# Patient Record
Sex: MALE | Race: WHITE | NOT HISPANIC OR LATINO | Employment: OTHER | URBAN - METROPOLITAN AREA
[De-identification: names, ages, dates, MRNs, and addresses within clinical notes are randomized per-mention and may not be internally consistent; named-entity substitution may affect disease eponyms.]

---

## 2016-06-10 LAB
ERYTHROCYTE [DISTWIDTH] IN BLOOD BY AUTOMATED COUNT: 13.5 %
HCT VFR BLD AUTO: 47.7 %
HEMOGLOBIN: 17 G/DL
MCH RBC QN AUTO: 33.1 PG
MCHC RBC AUTO-ENTMCNC: 35.6 G/DL
MCV RBC AUTO: 93 FL
PLATELET # BLD AUTO: 188 10^9/L
RBC # BLD AUTO: 5.13 10^12/L
WBC # BLD AUTO: 6 10^9/L

## 2017-01-24 DIAGNOSIS — I21.4 NSTEMI (NON-ST ELEVATED MYOCARDIAL INFARCTION) (H): Primary | ICD-10-CM

## 2017-01-25 ENCOUNTER — HOSPITAL ENCOUNTER (OUTPATIENT)
Dept: CARDIOLOGY | Facility: CLINIC | Age: 77
Discharge: HOME | End: 2017-01-25
Attending: INTERNAL MEDICINE | Admitting: INTERNAL MEDICINE
Payer: MEDICARE

## 2017-01-25 DIAGNOSIS — I21.4 NSTEMI (NON-ST ELEVATED MYOCARDIAL INFARCTION) (H): ICD-10-CM

## 2017-01-25 DIAGNOSIS — E78.2 MIXED HYPERLIPIDEMIA: ICD-10-CM

## 2017-01-25 LAB
ALT SERPL W P-5'-P-CCNC: <5 U/L (ref 5–30)
CHOLEST SERPL-MCNC: 177 MG/DL
HDLC SERPL-MCNC: 48 MG/DL
LDLC SERPL CALC-MCNC: 94 MG/DL
NONHDLC SERPL-MCNC: 129 MG/DL
TRIGL SERPL-MCNC: 174 MG/DL

## 2017-01-25 PROCEDURE — 80061 LIPID PANEL: CPT | Performed by: INTERNAL MEDICINE

## 2017-01-25 PROCEDURE — 84460 ALANINE AMINO (ALT) (SGPT): CPT | Performed by: INTERNAL MEDICINE

## 2017-01-25 PROCEDURE — 36415 COLL VENOUS BLD VENIPUNCTURE: CPT | Performed by: INTERNAL MEDICINE

## 2017-01-25 PROCEDURE — 93306 TTE W/DOPPLER COMPLETE: CPT | Mod: 26 | Performed by: INTERNAL MEDICINE

## 2017-01-25 PROCEDURE — 93306 TTE W/DOPPLER COMPLETE: CPT

## 2017-01-31 ENCOUNTER — DOCUMENTATION ONLY (OUTPATIENT)
Dept: CARDIOLOGY | Facility: CLINIC | Age: 77
End: 2017-01-31

## 2017-02-01 ENCOUNTER — OFFICE VISIT (OUTPATIENT)
Dept: CARDIOLOGY | Facility: CLINIC | Age: 77
End: 2017-02-01
Attending: INTERNAL MEDICINE
Payer: MEDICARE

## 2017-02-01 VITALS
HEART RATE: 80 BPM | DIASTOLIC BLOOD PRESSURE: 80 MMHG | WEIGHT: 215 LBS | HEIGHT: 71 IN | SYSTOLIC BLOOD PRESSURE: 158 MMHG | BODY MASS INDEX: 30.1 KG/M2

## 2017-02-01 DIAGNOSIS — I10 ESSENTIAL HYPERTENSION: ICD-10-CM

## 2017-02-01 DIAGNOSIS — I21.4 NSTEMI (NON-ST ELEVATED MYOCARDIAL INFARCTION) (H): ICD-10-CM

## 2017-02-01 DIAGNOSIS — E78.2 MIXED HYPERLIPIDEMIA: ICD-10-CM

## 2017-02-01 DIAGNOSIS — I10 BENIGN ESSENTIAL HYPERTENSION: ICD-10-CM

## 2017-02-01 PROCEDURE — 99214 OFFICE O/P EST MOD 30 MIN: CPT | Performed by: INTERNAL MEDICINE

## 2017-02-01 RX ORDER — CARVEDILOL 12.5 MG/1
12.5 TABLET ORAL 2 TIMES DAILY
Qty: 60 TABLET | Refills: 11 | Status: SHIPPED | OUTPATIENT
Start: 2017-02-01 | End: 2017-07-25

## 2017-02-01 NOTE — PROGRESS NOTES
HPI and Plan:   See dictation    Orders Placed This Encounter   Procedures     Lipid Profile     ALT     Lipid Profile     ALT     Follow-Up with Cardiac Advanced Practice Provider     Follow-Up with Cardiologist       Orders Placed This Encounter   Medications     carvedilol (COREG) 12.5 MG tablet     Sig: Take 1 tablet (12.5 mg) by mouth 2 times daily     Dispense:  60 tablet     Refill:  11       Medications Discontinued During This Encounter   Medication Reason     carvedilol (COREG) 6.25 MG tablet Reorder         Encounter Diagnoses   Name Primary?     Mixed hyperlipidemia      Benign essential hypertension      Essential hypertension      NSTEMI (non-ST elevated myocardial infarction) (H)        CURRENT MEDICATIONS:  Current Outpatient Prescriptions   Medication Sig Dispense Refill     carvedilol (COREG) 12.5 MG tablet Take 1 tablet (12.5 mg) by mouth 2 times daily 60 tablet 11     ticagrelor (BRILINTA) 90 MG tablet Take 1 tablet (90 mg) by mouth every 12 hours 60 tablet 6     Leuprolide Acetate (LUPRON IJ)        glimepiride (AMARYL) 4 MG tablet Take 1 tablet (4 mg) by mouth every morning (before breakfast) 90 tablet 1     metFORMIN (GLUCOPHAGE) 1000 MG tablet Take 1 tablet (1,000 mg) by mouth 2 times daily (with meals) Patient is due for office visit and/or labs before further refills. 60 tablet 0     metFORMIN (GLUCOPHAGE) 1000 MG tablet TAKE 1 TABLET BY MOUTH 2 TIMES DAILY WITH MEALS. DUE FOR LABS FOR REFILLS. 180 tablet 3     lisinopril-hydrochlorothiazide (PRINZIDE,ZESTORETIC) 20-12.5 MG per tablet Take 1 tablet by mouth daily 90 tablet 3     blood glucose monitoring (NO BRAND SPECIFIED) test strip Accu-Chek Smartview test strips      Use to test blood sugars 4  times daily or as directed 50 each 10     blood glucose monitoring (ACCU-CHEK FASTCLIX) lancets Use to test blood sugar 4 times daily or as directed.  Ok to substitute alternative if insurance prefers. 1 Box prn     atorvastatin (LIPITOR) 80 MG  tablet Take 1 tablet (80 mg) by mouth daily 90 tablet 3     Glucosamine-Chondroitin (OSTEO BI-FLEX REGULAR STRENGTH PO) Take 1 tablet by mouth daily       nitroglycerin (NITROSTAT) 0.4 MG SL tablet Place 1 tablet (0.4 mg) under the tongue every 5 minutes as needed for chest pain if you are still having symptoms after 3 doses (15 minutes) call 911. 25 tablet 1     minocycline (MINOCIN,DYNACIN) 50 MG capsule Take 50 mg by mouth daily as needed Prn for rosacea       clobetasol propionate 0.05 % LOTN Externally apply  topically. For hand and arm rash prn       desonide (DESOWEN) 0.05 % ointment Apply topically as needed Facial rash       aspirin 81 MG tablet Take 81 mg by mouth daily        Cholecalciferol (VITAMIN D3) 3,000 Units daily        [DISCONTINUED] carvedilol (COREG) 6.25 MG tablet Take 1 tablet (6.25 mg) by mouth 2 times daily 180 tablet 3       ALLERGIES   No Known Allergies    PAST MEDICAL HISTORY:  Past Medical History   Diagnosis Date     Hypertension 2002     Diabetes mellitus (H) 2005     Hypercholesteremia 2002     Rosacea      Nephrolithiasis 2007     Elevated PSA 2009     bx neg Dr. Dill     History of colonoscopy 2012     nl     Prostate cancer (H) 2013     surgery by Dr. Dudley, then had xrt     Erectile dysfunction        PAST SURGICAL HISTORY:  Past Surgical History   Procedure Laterality Date     Leg surgery  2005     Colonoscopy  1/20/2012     Procedure:COLONOSCOPY; COLONOSCOPY; Surgeon:ELSY GARCIA; Location: GI     Tonsillectomy  5     Robotic surgery for prostate cancer  2013     Dr. Dudley     Coronary angiography adult order       Heart cath, angioplasty         FAMILY HISTORY:  Family History   Problem Relation Age of Onset     CANCER Mother        SOCIAL HISTORY:  Social History     Social History     Marital Status:      Spouse Name: N/A     Number of Children: N/A     Years of Education: N/A     Occupational History     airport       Social History Main Topics  "    Smoking status: Never Smoker      Smokeless tobacco: Never Used     Alcohol Use: 0.0 oz/week     0 Standard drinks or equivalent per week      Comment: 1-2  per week     Drug Use: No     Sexual Activity: No     Other Topics Concern     Caffeine Concern No     no caffeine     Sleep Concern No     Stress Concern No     Weight Concern No     Special Diet Yes     cut out pop and sodium     Exercise Yes     walking a lot, cardio rehab     Social History Narrative    so I said we could give him an Accu-Chek Simi from the clinic (I may drop it off with you on 3/2/16).             Review of Systems:  Skin:  Negative       Eyes:  Positive for glasses    ENT:         Respiratory:  Negative       Cardiovascular:  Negative      Gastroenterology: Negative      Genitourinary:  Negative      Musculoskeletal:  Positive for joint pain;arthritis    Neurologic:  Negative      Psychiatric:  Negative      Heme/Lymph/Imm:  Negative      Endocrine:  Positive for diabetes      Physical Exam:  Vitals: /80 mmHg  Pulse 80  Ht 1.803 m (5' 11\")  Wt 97.523 kg (215 lb)  BMI 30.00 kg/m2    Constitutional:  cooperative, alert and oriented, well developed, well nourished, in no acute distress overweight      Skin:  warm and dry to the touch, no apparent skin lesions or masses noted        Head:  normocephalic, no masses or lesions        Eyes:  pupils equal and round, conjunctivae and lids unremarkable, sclera white, no xanthalasma, EOMS intact, no nystagmus        ENT:  no pallor or cyanosis, dentition good        Neck:  carotid pulses are full and equal bilaterally, JVP normal, no carotid bruit, no thyromegaly        Chest:  normal breath sounds, clear to auscultation, normal A-P diameter, normal symmetry, normal respiratory excursion, no use of accessory muscles          Cardiac: regular rhythm;normal S1 and S2;apical impulse not displaced   S4 no presence of murmur            Abdomen:  abdomen soft, non-tender, BS normoactive, no " mass, no HSM, no bruits        Vascular: pulses full and equal, no bruits auscultated                                        Extremities and Back:  no deformities, clubbing, cyanosis, erythema observed;no edema         right radial site clean without hum or bruit    Neurological:  affect appropriate, oriented to time, person and place;no gross motor deficits              CC  Lg Mancilla MD   [facility]  [address]  [place] [address]

## 2017-02-01 NOTE — PROGRESS NOTES
2017             Stephan Cedeno MD    [facility]   [address]   [place] [address]      RE:   Jaguar Tristan   MRN: [id number]   :  1940      Dear Dr. Cedeno:      It was my pleasure to see your patient, Jaguar Tristan, in followup.  As you know, this is a very pleasant gentleman with a known history of coronary artery disease.  If you remember, in 2016, he presented with a non-ST elevation myocardial infarct involving the inferior wall and he had 2 stents placed in the distal right coronary artery which were drug-eluting stents.  They were placed on 2016, so he is coming up to his 1-year anniversary of the placement of those stents.      He has had no chest pains or chest pressure, but he has had deterioration in some of his risk factors for coronary artery disease.  His lipid profile is significantly worse than previously.  His LDL has risen from 47 up to 94.  His HDL still remains good at 48 and his triglycerides have risen from 100 to 174.  He has gained a significant amount of weight.  In 2016, he weighed 206 pounds.  He is now 215 pounds.  He developed sacroiliac joint problems and has not been able to exercise which may explain in part why he has gained weight and why his blood pressure and lipids have worsened.  He says when he was down at [place], his blood pressure is in the high 130s.  In general, his blood pressure in the past has been well controlled.  I did recheck his blood pressure today at the end of our meeting and was 162/88.      IMPRESSION:      1.  Asymptomatic with respect to coronary artery disease.  He is nearly 1 year after the stenting of his right coronary artery for a non-ST elevation myocardial infarction.   2.  His left ventricular systolic function has returned to normal.  If you remember, he did have evidence of inferior hypokinesis at the time of his non-ST elevation myocardial infarct at this time last year.   3.  Deterioration of his  lipid profile as described above.   4.  Hypertension.  His blood pressures does not appear to be well controlled, and even on recheck his blood pressure was even higher.  This may in part be due to the fact that he has not been able to exercise because of sacroiliac pain.  That has significantly improved with physical therapy.   5.  Type 2 diabetes mellitus with hemoglobin A1c of 8.3 on the .      PLAN:  I will double the patient's carvedilol from 6.25 mg twice a day to 12.5 mg twice a day.  I have asked him to exercise to try and lose weight and to adhere to a low-cholesterol diet.  In 12 weeks' time, I will have him return and we will recheck his blood pressure and his cholesterol at that stage.  I will have him come back to see me in 6 months' time.  Finally, we will stop the Brilinta medication at the end of this month, which will be 1 year from the time of his stenting.        It has been a pleasure to be involved in the care of this very nice patient.      Sincerely,         MD JOE Smith MD, [facility]             D: 2017 09:45   T: 2017 14:11   MT: AYLA      Name:     ESTEFANIA HERNANDEZ   MRN:      [id number]        Account:      [id number]   :      1940           Service Date: 2017      Document: [id number]

## 2017-02-01 NOTE — Clinical Note
2017             Stephan Cedeno MD    [facility]   [address]   [place] [address]      RE:   Jaguar Tristan   MRN: [id number]   :  1940      Dear Dr. Cedeno:      It was my pleasure to see your patient, Jaguar Tristan, in followup.  As you know, this is a very pleasant gentleman with a known history of coronary artery disease.  If you remember, in 2016, he presented with a non-ST elevation myocardial infarct involving the inferior wall and he had 2 stents placed in the distal right coronary artery which were drug-eluting stents.  They were placed on 2016, so he is coming up to his 1-year anniversary of the placement of those stents.      He has had no chest pains or chest pressure, but he has had deterioration in some of his risk factors for coronary artery disease.  His lipid profile is significantly worse than previously.  His LDL has risen from 47 up to 94.  His HDL still remains good at 48 and his triglycerides have risen from 100 to 174.  He has gained a significant amount of weight.  In 2016, he weighed 206 pounds.  He is now 215 pounds.  He developed sacroiliac joint problems and has not been able to exercise which may explain in part why he has gained weight and why his blood pressure and lipids have worsened.  He says when he was down at [place], his blood pressure is in the high 130s.  In general, his blood pressure in the past has been well controlled.  I did recheck his blood pressure today at the end of our meeting and was 162/88.      IMPRESSION:      1.  Asymptomatic with respect to coronary artery disease.  He is nearly 1 year after the stenting of his right coronary artery for a non-ST elevation myocardial infarction.   2.  His left ventricular systolic function has returned to normal.  If you remember, he did have evidence of inferior hypokinesis at the time of his non-ST elevation myocardial infarct at this time last year.   3.  Deterioration of his  lipid profile as described above.   4.  Hypertension.  His blood pressures does not appear to be well controlled, and even on recheck his blood pressure was even higher.  This may in part be due to the fact that he has not been able to exercise because of sacroiliac pain.  That has significantly improved with physical therapy.   5.  Type 2 diabetes mellitus with hemoglobin A1c of 8.3 on the 11/30.      PLAN:  I will double the patient's carvedilol from 6.25 mg twice a day to 12.5 mg twice a day.  I have asked him to exercise to try and lose weight and to adhere to a low-cholesterol diet.  In 12 weeks' time, I will have him return and we will recheck his blood pressure and his cholesterol at that stage.  I will have him come back to see me in 6 months' time.  Finally, we will stop the Brilinta medication at the end of this month, which will be 1 year from the time of his stenting.        It has been a pleasure to be involved in the care of this very nice patient.      Sincerely,         Lg Jessica MD

## 2017-03-14 ENCOUNTER — OFFICE VISIT (OUTPATIENT)
Dept: FAMILY MEDICINE | Facility: CLINIC | Age: 77
End: 2017-03-14
Payer: MEDICARE

## 2017-03-14 VITALS
BODY MASS INDEX: 30.15 KG/M2 | TEMPERATURE: 98.3 F | SYSTOLIC BLOOD PRESSURE: 128 MMHG | HEIGHT: 71 IN | HEART RATE: 68 BPM | DIASTOLIC BLOOD PRESSURE: 79 MMHG | WEIGHT: 215.4 LBS | OXYGEN SATURATION: 88 %

## 2017-03-14 DIAGNOSIS — E11.69 TYPE 2 DIABETES MELLITUS WITH OTHER SPECIFIED COMPLICATION, WITHOUT LONG-TERM CURRENT USE OF INSULIN (H): Primary | ICD-10-CM

## 2017-03-14 LAB — HBA1C MFR BLD: 8.3 % (ref 4.3–6)

## 2017-03-14 PROCEDURE — 36415 COLL VENOUS BLD VENIPUNCTURE: CPT | Performed by: INTERNAL MEDICINE

## 2017-03-14 PROCEDURE — 99213 OFFICE O/P EST LOW 20 MIN: CPT | Performed by: INTERNAL MEDICINE

## 2017-03-14 PROCEDURE — 83036 HEMOGLOBIN GLYCOSYLATED A1C: CPT | Performed by: INTERNAL MEDICINE

## 2017-03-14 RX ORDER — GLIMEPIRIDE 4 MG/1
TABLET ORAL
Qty: 45 TABLET | Refills: 2 | Status: SHIPPED | OUTPATIENT
Start: 2017-03-14 | End: 2017-07-11

## 2017-03-14 ASSESSMENT — ANXIETY QUESTIONNAIRES
1. FEELING NERVOUS, ANXIOUS, OR ON EDGE: NOT AT ALL
2. NOT BEING ABLE TO STOP OR CONTROL WORRYING: NOT AT ALL
5. BEING SO RESTLESS THAT IT IS HARD TO SIT STILL: NOT AT ALL
7. FEELING AFRAID AS IF SOMETHING AWFUL MIGHT HAPPEN: NOT AT ALL
6. BECOMING EASILY ANNOYED OR IRRITABLE: NOT AT ALL
3. WORRYING TOO MUCH ABOUT DIFFERENT THINGS: NOT AT ALL
GAD7 TOTAL SCORE: 0

## 2017-03-14 ASSESSMENT — ENCOUNTER SYMPTOMS
TINGLING: 0
NAUSEA: 0
ABDOMINAL PAIN: 0
PALPITATIONS: 0
POLYDIPSIA: 0
SENSORY CHANGE: 0
SHORTNESS OF BREATH: 0
DIARRHEA: 0
PND: 0
VOMITING: 0
DIZZINESS: 0
ORTHOPNEA: 0
EYE PAIN: 0
CLAUDICATION: 0
DIAPHORESIS: 0
FOCAL WEAKNESS: 0
WEIGHT LOSS: 0
BLURRED VISION: 0
COUGH: 0
HEADACHES: 0

## 2017-03-14 ASSESSMENT — PATIENT HEALTH QUESTIONNAIRE - PHQ9: 5. POOR APPETITE OR OVEREATING: NOT AT ALL

## 2017-03-14 NOTE — NURSING NOTE
"Chief Complaint   Patient presents with     Follow Up For     Diabetes       Initial /87 (BP Location: Left arm, Patient Position: Chair, Cuff Size: Adult Large)  Pulse 68  Temp 98.3  F (36.8  C) (Oral)  Ht 5' 11\" (1.803 m)  Wt 215 lb 6.4 oz (97.7 kg)  SpO2 (!) 88%  BMI 30.04 kg/m2 Estimated body mass index is 30.04 kg/(m^2) as calculated from the following:    Height as of this encounter: 5' 11\" (1.803 m).    Weight as of this encounter: 215 lb 6.4 oz (97.7 kg).  Medication Reconciliation: complete     ALYCE Dolan MA March 14, 2017 9:26 AM      "

## 2017-03-14 NOTE — MR AVS SNAPSHOT
"              After Visit Summary   3/14/2017    Jaguar Tristan    MRN: [id number]           Patient Information     Date Of Birth          1940        Visit Information        Provider Department      3/14/2017 9:30 AM Stephan Cedeno MD [facility]        Today's Diagnoses     Type 2 diabetes mellitus with other specified complication, without long-term current use of insulin (H)    -  1      Care Instructions    Increase your glimepiride dose by 2 mg every 2 weeks up to maximum of 8 mg once daily until your blood sugar readings are consistently between 100-140, and not going less than 80.    Continue your metformin.    Follow up in 3 months.          Follow-ups after your visit        Who to contact     If you have questions or need follow up information about today's clinic visit or your schedule please contact [facility] directly at [phone].  Normal or non-critical lab and imaging results will be communicated to you by MyChart, letter or phone within 4 business days after the clinic has received the results. If you do not hear from us within 7 days, please contact the clinic through [employer] or phone. If you have a critical or abnormal lab result, we will notify you by phone as soon as possible.  Submit refill requests through [employer] or call your pharmacy and they will forward the refill request to us. Please allow 3 business days for your refill to be completed.          Additional Information About Your Visit        MyChart Information     [employer] lets you send messages to your doctor, view your test results, renew your prescriptions, schedule appointments and more. To sign up, go to [email or web address] . Click on \"Log in\" on the left side of the screen, which will take you to the Welcome page. Then click on \"Sign up Now\" on the right side of the page.     You will be asked to enter the access code listed below, as well as some personal information. " "Please follow the directions to create your username and password.     Your access code is: [email or web address]  Expires: 2017  9:57 AM     Your access code will  in 90 days. If you need help or a new code, please call your [facility] clinic or [phone].        Care EveryWhere ID     This is your Care EveryWhere ID. This could be used by other organizations to access your [facility] medical records  [id number]        Your Vitals Were     Pulse Temperature Height Pulse Oximetry BMI (Body Mass Index)       68 98.3  F (36.8  C) (Oral) 5' 11\" (1.803 m) 88% 30.04 kg/m2        Blood Pressure from Last 3 Encounters:   17 141/87   17 158/80   16 119/72    Weight from Last 3 Encounters:   17 215 lb 6.4 oz (97.7 kg)   17 215 lb (97.5 kg)   16 209 lb (94.8 kg)              We Performed the Following     Hemoglobin A1c          Today's Medication Changes          These changes are accurate as of: 3/14/17  9:57 AM.  If you have any questions, ask your nurse or doctor.               These medicines have changed or have updated prescriptions.        Dose/Directions    glimepiride 4 MG tablet   Commonly known as:  AMARYL   This may have changed:    - how much to take  - how to take this  - when to take this  - additional instructions   Used for:  Type 2 diabetes mellitus with other specified complication, without long-term current use of insulin (H)   Changed by:  Stephan Cedeno MD        1 1/2 tablets once a day   Quantity:  45 tablet   Refills:  2            Where to get your medicines      These medications were sent to [place] [address] [place] [facility] [address] [employer] [facility]  [address] [facility] [address]     Phone:  [phone]     glimepiride 4 MG tablet                Primary Care Provider Office Phone # Fax #    Stephan Cedeno [phone]       [facility] [address]  [facility] [address]      "   Goals        General    Medication 1 (pt-stated)     Notes - Note edited  3/16/2016  9:36 AM by Barbara Davila, RN    I will carry my nitroglycerine with me wherever I go and take as prescribed if needed.  As of today's date 2/26/2016 goal is met at 0 - 25%.   Goal Status:  Ongoing  As of today's date 3/16/2016 goal is met at 51 - 75%.   Goal Status:  Ongoing            Thank you!     Thank you for choosing [facility]  for your care. Our goal is always to provide you with excellent care. Hearing back from our patients is one way we can continue to improve our services. Please take a few minutes to complete the written survey that you may receive in the mail after your visit with us. Thank you!             Your Updated Medication List - Protect others around you: Learn how to safely use, store and throw away your medicines at www.disposemymeds.org.          This list is accurate as of: 3/14/17  9:57 AM.  Always use your most recent med list.                   Brand Name Dispense Instructions for use    aspirin 81 MG tablet      Take 81 mg by mouth daily       atorvastatin 80 MG tablet    LIPITOR    90 tablet    Take 1 tablet (80 mg) by mouth daily       blood glucose monitoring lancets     1 Box    Use to test blood sugar 4 times daily or as directed.  Ok to substitute alternative if insurance prefers.       blood glucose monitoring test strip    no brand specified    50 each    Accu-Chek Smartview test strips      Use to test blood sugars 4  times daily or as directed       carvedilol 12.5 MG tablet    COREG    60 tablet    Take 1 tablet (12.5 mg) by mouth 2 times daily       clobetasol propionate 0.05 % Lotn      Externally apply  topically. For hand and arm rash prn       desonide 0.05 % ointment    DESOWEN     Apply topically as needed Facial rash       glimepiride 4 MG tablet    AMARYL    45 tablet    1 1/2 tablets once a day       lisinopril-hydrochlorothiazide 20-12.5 MG per tablet     PRINZIDE/ZESTORETIC    90 tablet    Take 1 tablet by mouth daily       LUPRON IJ          * metFORMIN 1000 MG tablet    GLUCOPHAGE    180 tablet    TAKE 1 TABLET BY MOUTH 2 TIMES DAILY WITH MEALS. DUE FOR LABS FOR REFILLS.       * metFORMIN 1000 MG tablet    GLUCOPHAGE    60 tablet    Take 1 tablet (1,000 mg) by mouth 2 times daily (with meals) Patient is due for office visit and/or labs before further refills.       minocycline 50 MG capsule    MINOCIN/DYNACIN     Take 50 mg by mouth daily as needed Prn for rosacea       nitroglycerin 0.4 MG sublingual tablet    NITROSTAT    25 tablet    Place 1 tablet (0.4 mg) under the tongue every 5 minutes as needed for chest pain if you are still having symptoms after 3 doses (15 minutes) call 911.       OSTEO BI-FLEX REGULAR STRENGTH PO      Take 1 tablet by mouth daily       ticagrelor 90 MG tablet    BRILINTA    60 tablet    Take 1 tablet (90 mg) by mouth every 12 hours       VITAMIN D3      3,000 Units daily       * Notice:  This list has 2 medication(s) that are the same as other medications prescribed for you. Read the directions carefully, and ask your doctor or other care provider to review them with you.

## 2017-03-14 NOTE — PROGRESS NOTES
"HPI Comments:   He is a known diabetic and is on oral therapy with metformin and glimepiride. He used to be on Lantus; however, as he became more active with exercise the Lantus was discontinued.  Denies having any polydipsia, polyuria, polyphagia.  Hemoglobin A1c today is 8.3.    Following up at the [facility] ER for his prostate cancer. Recent PSA was reportedly unremarkable. He is currently receiving Lupron shots.      Past Medical History   Diagnosis Date     Diabetes mellitus (H) 2005     Elevated PSA 2009     bx neg Dr. Dill     Erectile dysfunction      History of colonoscopy 2012     nl     Hypercholesteremia 2002     Hypertension 2002     Nephrolithiasis 2007     Prostate cancer (H) 2013     surgery by Dr. Dudley, then had xrt     Rosacea        Review of Systems   Constitutional: Negative for diaphoresis, malaise/fatigue and weight loss.   Eyes: Negative for blurred vision and pain.   Respiratory: Negative for cough and shortness of breath.    Cardiovascular: Negative for chest pain, palpitations, orthopnea, claudication, leg swelling and PND.   Gastrointestinal: Negative for abdominal pain, diarrhea, nausea and vomiting.   Neurological: Negative for dizziness, tingling, sensory change, focal weakness and headaches.   Endo/Heme/Allergies: Negative for polydipsia.       /87 (BP Location: Left arm, Patient Position: Chair, Cuff Size: Adult Large)  Pulse 68  Temp 98.3  F (36.8  C) (Oral)  Ht 5' 11\" (1.803 m)  Wt 215 lb 6.4 oz (97.7 kg)  SpO2 (!) 88%  BMI 30.04 kg/m2      Physical Exam   Constitutional: He is oriented to person, place, and time. No distress.   Eyes: Conjunctivae and EOM are normal. Pupils are equal, round, and reactive to light.   Neck: No thyromegaly present.   Cardiovascular: Normal rate, regular rhythm and normal heart sounds.    Pulmonary/Chest: Effort normal and breath sounds normal. No respiratory distress.   Musculoskeletal: He exhibits no edema.   Neurological: He is alert and " oriented to person, place, and time. He has normal reflexes. Coordination normal. GCS score is 15.   Nursing note and vitals reviewed.        ICD-10-CM    1. Type 2 diabetes mellitus with other specified complication, without long-term current use of insulin (H) E11.69 glimepiride (AMARYL) 4 MG tablet         Patient Instructions   Increase your glimepiride dose by 2 mg every 2 weeks up to maximum of 8 mg once daily until your blood sugar readings are consistently between 100-140, and not going less than 80.    Continue your metformin.    Follow up in 3 months.

## 2017-03-14 NOTE — PATIENT INSTRUCTIONS
Increase your glimepiride dose by 2 mg every 2 weeks up to maximum of 8 mg once daily until your blood sugar readings are consistently between 100-140, and not going less than 80.    Continue your metformin.    Follow up in 3 months.

## 2017-03-15 ASSESSMENT — PATIENT HEALTH QUESTIONNAIRE - PHQ9: SUM OF ALL RESPONSES TO PHQ QUESTIONS 1-9: 0

## 2017-03-15 ASSESSMENT — ANXIETY QUESTIONNAIRES: GAD7 TOTAL SCORE: 0

## 2017-05-24 DIAGNOSIS — E78.5 HYPERLIPIDEMIA LDL GOAL <100: ICD-10-CM

## 2017-05-24 RX ORDER — ATORVASTATIN CALCIUM 80 MG/1
80 TABLET, FILM COATED ORAL DAILY
Qty: 90 TABLET | Refills: 0 | Status: SHIPPED | OUTPATIENT
Start: 2017-05-24 | End: 2017-08-24

## 2017-05-24 NOTE — TELEPHONE ENCOUNTER
Prescription approved per [facility] [place] Protocol.  Shawna Crespo RN

## 2017-05-24 NOTE — TELEPHONE ENCOUNTER
Pending Prescriptions:                       Disp   Refills    atorvastatin (LIPITOR) 80 MG tablet       90 tab*3            Sig: Take 1 tablet (80 mg) by mouth daily         Last Written Prescription Date: 3/3/16  Last Fill Quantity: 90, # refills: 3  Last Office Visit with [facility], [facility] or [facility] prescribing provider: 3/14/17       Lab Results   Component Value Date    CHOL 177 01/25/2017     Lab Results   Component Value Date    HDL 48 01/25/2017     Lab Results   Component Value Date    LDL 94 01/25/2017     Lab Results   Component Value Date    TRIG 174 01/25/2017     Lab Results   Component Value Date    CHOLHDLRATIO 3.9 05/27/2015

## 2017-06-08 DIAGNOSIS — I10 ESSENTIAL HYPERTENSION: ICD-10-CM

## 2017-06-08 RX ORDER — LISINOPRIL AND HYDROCHLOROTHIAZIDE 12.5; 2 MG/1; MG/1
1 TABLET ORAL DAILY
Qty: 90 TABLET | Refills: 0 | Status: SHIPPED | OUTPATIENT
Start: 2017-06-08 | End: 2017-09-15

## 2017-06-29 ENCOUNTER — TELEPHONE (OUTPATIENT)
Dept: FAMILY MEDICINE | Facility: CLINIC | Age: 77
End: 2017-06-29

## 2017-06-29 DIAGNOSIS — E11.69 TYPE 2 DIABETES MELLITUS WITH OTHER SPECIFIED COMPLICATION, WITHOUT LONG-TERM CURRENT USE OF INSULIN (H): ICD-10-CM

## 2017-06-29 RX ORDER — GLIMEPIRIDE 4 MG/1
TABLET ORAL
Qty: 45 TABLET | Refills: 2 | Status: CANCELLED | OUTPATIENT
Start: 2017-06-29

## 2017-06-29 NOTE — TELEPHONE ENCOUNTER
Glimeperide         Last Written Prescription Date: 03/14/17  Last Fill Quantity: 45, # refills: 2  [place] [facility] [place] [facility] or  Health prescribing provider:  03/14/17   Next 5 appointments (look out 90 days)     Jul 11, 2017  9:30 AM CDT   Office Visit with Stephan Cedeno MD   [facility] ([facility])    [address] [facility] [address] [phone]            Sep 05, 2017 10:45 AM CDT   Return Visit with Lg Mancilla MD   [facility] [place] ([facility])    [address]  [place] [address] [phone]                   BP Readings from Last 3 Encounters:   03/14/17 128/79   02/01/17 158/80   11/30/16 119/72     Lab Results   Component Value Date    MICROL 8 03/03/2016     Lab Results   Component Value Date    UMALCR 4.29 03/03/2016     Creatinine   Date Value Ref Range Status   03/11/2016 1.18 0.70 - 1.30 mg/dL Final   ]  GFR Estimate   Date Value Ref Range Status   03/11/2016 64 >60 mL/min/1.7m2 Final   03/03/2016 79 >60 mL/min/1.7m2 Final     Comment:     Non  GFR Calc   02/23/2016 >90  Non  GFR Calc   >60 mL/min/1.7m2 Final     GFR Estimate If Black   Date Value Ref Range Status   03/11/2016 78 >60 mL/min/1.7m2 Final   03/03/2016 >90   GFR Calc   >60 mL/min/1.7m2 Final   02/23/2016 >90   GFR Calc   >60 mL/min/1.7m2 Final     Lab Results   Component Value Date    CHOL 177 01/25/2017     Lab Results   Component Value Date    HDL 48 01/25/2017     Lab Results   Component Value Date    LDL 94 01/25/2017     Lab Results   Component Value Date    TRIG 174 01/25/2017     Lab Results   Component Value Date    CHOLHDLRATIO 3.9 05/27/2015     Lab Results   Component Value Date    AST 10 03/03/2016     Lab Results   Component Value Date    ALT <5 01/25/2017     Lab Results   Component Value Date    A1C 8.3 03/14/2017     A1C 8.3 11/30/2016    A1C 10.9 02/20/2016    A1C 7.4 03/07/2014    A1C 6.6 07/30/2013     Potassium   Date Value Ref Range Status   03/11/2016 4.2 3.5 - 5.1 mmol/L Final     Jayshree Del Cid MA

## 2017-06-29 NOTE — TELEPHONE ENCOUNTER
Reason for Call:  Medication or medication refill:    Do you use a [facility]?  Name of the pharmacy and phone number for the current request:    [facility] [id number] [place] [facility], [place] - [address] [address]    Name of the medication requested: glimepiride (AMARYL) 4 MG tablet    Other request: Please refill    Can we leave a detailed message on this number? YES    Phone number patient can be reached at: Home number on file [phone] (home)    Best Time: anytime    Call taken on 6/29/2017 at 11:26 AM by Deepa Malin

## 2017-07-11 ENCOUNTER — OFFICE VISIT (OUTPATIENT)
Dept: FAMILY MEDICINE | Facility: CLINIC | Age: 77
End: 2017-07-11
Payer: MEDICARE

## 2017-07-11 ENCOUNTER — TELEPHONE (OUTPATIENT)
Dept: FAMILY MEDICINE | Facility: CLINIC | Age: 77
End: 2017-07-11

## 2017-07-11 VITALS
BODY MASS INDEX: 30.88 KG/M2 | OXYGEN SATURATION: 98 % | SYSTOLIC BLOOD PRESSURE: 123 MMHG | DIASTOLIC BLOOD PRESSURE: 72 MMHG | HEART RATE: 68 BPM | HEIGHT: 71 IN | TEMPERATURE: 97.6 F | WEIGHT: 220.6 LBS

## 2017-07-11 DIAGNOSIS — Z00.00 MEDICARE ANNUAL WELLNESS VISIT, SUBSEQUENT: Primary | ICD-10-CM

## 2017-07-11 DIAGNOSIS — I25.10 CORONARY ARTERY DISEASE INVOLVING NATIVE CORONARY ARTERY OF NATIVE HEART WITHOUT ANGINA PECTORIS: ICD-10-CM

## 2017-07-11 DIAGNOSIS — E11.9 TYPE 2 DIABETES MELLITUS WITHOUT COMPLICATION, WITHOUT LONG-TERM CURRENT USE OF INSULIN (H): ICD-10-CM

## 2017-07-11 DIAGNOSIS — C61 PROSTATE CANCER (H): ICD-10-CM

## 2017-07-11 LAB — HBA1C MFR BLD: 9.4 % (ref 4.3–6)

## 2017-07-11 PROCEDURE — 83036 HEMOGLOBIN GLYCOSYLATED A1C: CPT | Performed by: INTERNAL MEDICINE

## 2017-07-11 PROCEDURE — G0439 PPPS, SUBSEQ VISIT: HCPCS | Performed by: INTERNAL MEDICINE

## 2017-07-11 PROCEDURE — 36415 COLL VENOUS BLD VENIPUNCTURE: CPT | Performed by: INTERNAL MEDICINE

## 2017-07-11 RX ORDER — GLIMEPIRIDE 4 MG/1
8 TABLET ORAL
Qty: 60 TABLET | Refills: 3 | Status: SHIPPED | OUTPATIENT
Start: 2017-07-11 | End: 2018-01-12

## 2017-07-11 ASSESSMENT — ENCOUNTER SYMPTOMS
DIARRHEA: 0
SLEEP DISTURBANCE: 0
MYALGIAS: 0
NUMBNESS: 0
FEVER: 0
DYSPHORIC MOOD: 0
UNEXPECTED WEIGHT CHANGE: 0
TROUBLE SWALLOWING: 0
PALPITATIONS: 0
NERVOUS/ANXIOUS: 0
NAUSEA: 0
SHORTNESS OF BREATH: 0
NECK PAIN: 0
SORE THROAT: 0
CHILLS: 0
ARTHRALGIAS: 0
LIGHT-HEADEDNESS: 0
EYE PAIN: 0
CONSTIPATION: 0
DIZZINESS: 0
FATIGUE: 0
BLOOD IN STOOL: 0
DIFFICULTY URINATING: 0
ABDOMINAL PAIN: 0
VOMITING: 0
COUGH: 0
BACK PAIN: 0
HEADACHES: 0

## 2017-07-11 NOTE — TELEPHONE ENCOUNTER
PCP:    Please clarify medication dosing.  Was the Patient to increase his dose?    glimepiride (AMARYL) 4 MG tablet 60 tablet 3 7/11/2017  No      Sig: Take 2 tablets (8 mg) by mouth every morning (before breakfast) 1 1/2 tablets once a day     Is the Patient to take 1.5 or 2 tabs of the glimepiride daily?    Please advise  Thank you    Latrice Guo RN

## 2017-07-11 NOTE — TELEPHONE ENCOUNTER
Called Christina at [facility] pharmacy to clarify the glimepiride dosing as noted below by PCP.    Latrice Guo RN

## 2017-07-11 NOTE — PATIENT INSTRUCTIONS
Reduce your calorie intake.    Return to lab in 3 months to recheck your diabetes control.        Preventive Health Recommendations:       Male Ages 65 and over    Yearly exam:             See your health care provider every year in order to  o   Review health changes.   o   Discuss preventive care.    o   Review your medicines if your doctor has prescribed any.    Talk with your health care provider about whether you should have a test to screen for prostate cancer (PSA).    Every 3 years, have a diabetes test (fasting glucose). If you are at risk for diabetes, you should have this test more often.    Every 5 years, have a cholesterol test. Have this test more often if you are at risk for high cholesterol or heart disease.     Every 10 years, have a colonoscopy. Or, have a yearly FIT test (stool test). These exams will check for colon cancer.    Talk to with your health care provider about screening for Abdominal Aortic Aneurysm if you have a family history of AAA or have a history of smoking.  Shots:     Get a flu shot each year.     Get a tetanus shot every 10 years.     Talk to your doctor about your pneumonia vaccines. There are now two you should receive - Pneumovax (PPSV 23) and Prevnar (PCV 13).    Talk to your doctor about a shingles vaccine.     Talk to your doctor about the hepatitis B vaccine.  Nutrition:     Eat at least 5 servings of fruits and vegetables each day.     Eat whole-grain bread, whole-wheat pasta and brown rice instead of white grains and rice.     Talk to your doctor about Calcium and Vitamin D.   Lifestyle    Exercise for at least 150 minutes a week (30 minutes a day, 5 days a week). This will help you control your weight and prevent disease.     Limit alcohol to one drink per day.     No smoking.     Wear sunscreen to prevent skin cancer.     See your dentist every six months for an exam and cleaning.     See your eye doctor every 1 to 2 years to screen for conditions such as glaucoma,  macular degeneration and cataracts.

## 2017-07-11 NOTE — MR AVS SNAPSHOT
After Visit Summary   7/11/2017    Jaguar Tristan    MRN: [id number]           Patient Information     Date Of Birth          1940        Visit Information        Provider Department      7/11/2017 9:30 AM Stephan Cedeno MD [facility]        Today's Diagnoses     Type 2 diabetes mellitus without complication, without long-term current use of insulin (H)          Care Instructions    Reduce your calorie intake.    Return to lab in 3 months to recheck your diabetes control.        Preventive Health Recommendations:       Male Ages 65 and over    Yearly exam:             See your health care provider every year in order to  o   Review health changes.   o   Discuss preventive care.    o   Review your medicines if your doctor has prescribed any.    Talk with your health care provider about whether you should have a test to screen for prostate cancer (PSA).    Every 3 years, have a diabetes test (fasting glucose). If you are at risk for diabetes, you should have this test more often.    Every 5 years, have a cholesterol test. Have this test more often if you are at risk for high cholesterol or heart disease.     Every 10 years, have a colonoscopy. Or, have a yearly FIT test (stool test). These exams will check for colon cancer.    Talk to with your health care provider about screening for Abdominal Aortic Aneurysm if you have a family history of AAA or have a history of smoking.  Shots:     Get a flu shot each year.     Get a tetanus shot every 10 years.     Talk to your doctor about your pneumonia vaccines. There are now two you should receive - Pneumovax (PPSV 23) and Prevnar (PCV 13).    Talk to your doctor about a shingles vaccine.     Talk to your doctor about the hepatitis B vaccine.  Nutrition:     Eat at least 5 servings of fruits and vegetables each day.     Eat whole-grain bread, whole-wheat pasta and brown rice instead of white grains and rice.     Talk to your  doctor about Calcium and Vitamin D.   Lifestyle    Exercise for at least 150 minutes a week (30 minutes a day, 5 days a week). This will help you control your weight and prevent disease.     Limit alcohol to one drink per day.     No smoking.     Wear sunscreen to prevent skin cancer.     See your dentist every six months for an exam and cleaning.     See your eye doctor every 1 to 2 years to screen for conditions such as glaucoma, macular degeneration and cataracts.          Follow-ups after your visit        Your next 10 appointments already scheduled     Sep 05, 2017  9:50 AM CDT   LAB with [facility]   [facility] ([facility])    [address] [place] [id number] [phone]           Patient must bring picture ID.  Patient should be prepared to give a urine specimen  Please do not eat 10-12 hours before your appointment if you are coming in fasting for labs on lipids, cholesterol, or glucose (sugar).  Pregnant women should follow their Care Team instructions. Water with medications is okay. Do not drink coffee or other fluids.   If you have concerns about taking  your medications, please ask at office or if scheduling via [employer], send a message by clicking on Secure Messaging, Message Your Care Team.            Sep 05, 2017 10:45 AM CDT   Return Visit with Lg Mancilla MD   [facility] [place] ([facility])    [address] [place] [id number] [phone]              Future tests that were ordered for you today     Open Future Orders        Priority Expected Expires Ordered    Lipid panel reflex to direct LDL Routine  7/11/2018 7/11/2017            Who to contact     If you have questions or need follow up information about today's clinic visit or your schedule please contact [facility] directly at [phone].  Normal or non-critical lab and imaging  "results will be communicated to you by MyChart, letter or phone within 4 business days after the clinic has received the results. If you do not hear from us within 7 days, please contact the clinic through Mobile Event Guide or phone. If you have a critical or abnormal lab result, we will notify you by phone as soon as possible.  Submit refill requests through Mobile Event Guide or call your pharmacy and they will forward the refill request to us. Please allow 3 business days for your refill to be completed.          Additional Information About Your Visit        Mobile Event Guide Information     Mobile Event Guide lets you send messages to your doctor, view your test results, renew your prescriptions, schedule appointments and more. To sign up, go to [email or web address] . Click on \"Log in\" on the left side of the screen, which will take you to the Welcome page. Then click on \"Sign up Now\" on the right side of the page.     You will be asked to enter the access code listed below, as well as some personal information. Please follow the directions to create your username and password.     Your access code is: [email or web address]  Expires: 10/9/2017 10:59 AM     Your access code will  in 90 days. If you need help or a new code, please call your [place] clinic or [phone].        Care EveryWhere ID     This is your Care EveryWhere ID. This could be used by other organizations to access your [place] medical records  [id number]        Your Vitals Were     Pulse Temperature Height Pulse Oximetry BMI (Body Mass Index)       68 97.6  F (36.4  C) (Oral) 5' 11\" (1.803 m) 98% 30.77 kg/m2        Blood Pressure from Last 3 Encounters:   17 123/72   17 128/79   17 158/80    Weight from Last 3 Encounters:   17 220 lb 9.6 oz (100.1 kg)   17 215 lb 6.4 oz (97.7 kg)   17 215 lb (97.5 kg)              We Performed the Following     Hemoglobin A1c          Today's Medication Changes          These changes are accurate as of: 17 " 10:59 AM.  If you have any questions, ask your nurse or doctor.               These medicines have changed or have updated prescriptions.        Dose/Directions    glimepiride 4 MG tablet   Commonly known as:  AMARYL   This may have changed:    - how much to take  - how to take this  - when to take this   Used for:  Type 2 diabetes mellitus without complication, without long-term current use of insulin (H)   Changed by:  Stephan Cedeno MD        Dose:  8 mg   Take 2 tablets (8 mg) by mouth every morning (before breakfast) 1 1/2 tablets once a day   Quantity:  60 tablet   Refills:  3            Where to get your medicines      These medications were sent to [place] [id number] [place] [facility] - [place], [place] - [address] [address]  [address] [facility] [address]     Phone:  [phone]     glimepiride 4 MG tablet    metFORMIN 1000 MG tablet                Primary Care Provider Office Phone # Fax #    Stephan Cedeno [phone]       [facility] [address]  [facility] [facility] [place] [facility] [place] [address]        Goals        General    Medication 1 (pt-stated)     Notes - Note edited  3/16/2016  9:36 AM by Barbara Davila, RN    I will carry my nitroglycerine with me wherever I go and take as prescribed if needed.  As of today's date 2/26/2016 goal is met at 0 - 25%.   Goal Status:  Ongoing  As of today's date 3/16/2016 goal is met at 51 - 75%.   Goal Status:  Ongoing            Equal Access to Services     [facility] AH: Hadii aad ku hadasho Soomaali, waaxda luqadaha, qaybta kaalmada adeegyada, jeanie chaney. So [facility] [phone].    ATENCIÓN: Si habla anne, tiene a winslow disposición servicios gratuitos de asistencia lingüística. Llame al [phone].    We comply with applicable federal civil rights laws and [place] laws. We do not discriminate on the basis of race, color, national origin, age, disability sex, sexual orientation or gender  identity.            Thank you!     Thank you for choosing [facility]  for your care. Our goal is always to provide you with excellent care. Hearing back from our patients is one way we can continue to improve our services. Please take a few minutes to complete the written survey that you may receive in the mail after your visit with us. Thank you!             Your Updated Medication List - Protect others around you: Learn how to safely use, store and throw away your medicines at www.disposemymeds.org.          This list is accurate as of: 7/11/17 10:59 AM.  Always use your most recent med list.                   Brand Name Dispense Instructions for use Diagnosis    aspirin 81 MG tablet      Take 81 mg by mouth daily        atorvastatin 80 MG tablet    LIPITOR    90 tablet    Take 1 tablet (80 mg) by mouth daily    Hyperlipidemia LDL goal <100       blood glucose monitoring lancets     1 Box    Use to test blood sugar 4 times daily or as directed.  Ok to substitute alternative if insurance prefers.    Type 2 diabetes mellitus without complications (H)       blood glucose monitoring test strip    no brand specified    50 each    Accu-Chek Smartview test strips      Use to test blood sugars 4  times daily or as directed    Type 2 diabetes mellitus without complications (H)       carvedilol 12.5 MG tablet    COREG    60 tablet    Take 1 tablet (12.5 mg) by mouth 2 times daily    NSTEMI (non-ST elevated myocardial infarction) (H)       clobetasol propionate 0.05 % Lotn      Externally apply  topically. For hand and arm rash prn        desonide 0.05 % ointment    DESOWEN     Apply topically as needed Facial rash        glimepiride 4 MG tablet    AMARYL    60 tablet    Take 2 tablets (8 mg) by mouth every morning (before breakfast) 1 1/2 tablets once a day    Type 2 diabetes mellitus without complication, without long-term current use of insulin (H)       lisinopril-hydrochlorothiazide 20-12.5 MG per tablet     PRINZIDE/ZESTORETIC    90 tablet    Take 1 tablet by mouth daily    Essential hypertension       LUPRON IJ           metFORMIN 1000 MG tablet    GLUCOPHAGE    60 tablet    Take 1 tablet (1,000 mg) by mouth 2 times daily (with meals) Patient is due for office visit and/or labs before further refills.    Type 2 diabetes mellitus without complication, without long-term current use of insulin (H)       minocycline 50 MG capsule    MINOCIN/DYNACIN     Take 50 mg by mouth daily as needed Prn for rosacea        nitroGLYcerin 0.4 MG sublingual tablet    NITROSTAT    25 tablet    Place 1 tablet (0.4 mg) under the tongue every 5 minutes as needed for chest pain if you are still having symptoms after 3 doses (15 minutes) call 911.    NSTEMI (non-ST elevated myocardial infarction) (H)       OSTEO BI-FLEX REGULAR STRENGTH PO      Take 1 tablet by mouth daily        VITAMIN D3      3,000 Units daily

## 2017-07-11 NOTE — NURSING NOTE
"Chief Complaint   Patient presents with     Wellness Visit       Initial /72 (BP Location: Left arm, Patient Position: Chair, Cuff Size: Adult Regular)  Pulse 68  Temp 97.6  F (36.4  C) (Oral)  Ht 5' 11\" (1.803 m)  Wt 220 lb 9.6 oz (100.1 kg)  SpO2 98%  BMI 30.77 kg/m2 Estimated body mass index is 30.77 kg/(m^2) as calculated from the following:    Height as of this encounter: 5' 11\" (1.803 m).    Weight as of this encounter: 220 lb 9.6 oz (100.1 kg).  Medication Reconciliation: complete.  Ericka Aguero CMA    "

## 2017-07-11 NOTE — TELEPHONE ENCOUNTER
Reason for Call:  Other clarification.    Detailed comments: two sets of directions came over for the medication.  Please clarify them.    Phone Number Patient can be reached at: [phone]    Best Time: any    Can we leave a detailed message on this number? YES    Call taken on 7/11/2017 at 11:11 AM by Hansa Young

## 2017-07-11 NOTE — PROGRESS NOTES
SUBJECTIVE:   Jaguar Tristan is a 76 year old male who presents for Preventive Visit.      Patient is a known diabetic and is currently on glimepiride and metformin. His HgbA1c today is 9.4. Patient admits that he oftentimes misses his second dose of metformin.    He has a history of coronary artery disease and follows up closely with cardiology who is monitoring his lipids. He has a follow-up with them this coming September.  Denies chest pain, palpitations, shortness of breath.    He follows up at the [facility] for his prostate cancer for which he is receiving Lupron. He just recently had a visit with them and stated that his PSA was reportedly undetectable.  Denies urinary symptoms.    No subjective complaints presented.      Are you in the first 12 months of your Medicare Part B coverage?  No    Healthy Habits:    Do you get at least three servings of calcium containing foods daily (dairy, green leafy vegetables, etc.)? yes    Amount of exercise or daily activities, outside of work: 5 day(s) per week    Problems taking medications regularly No    Medication side effects: Yes nora    Have you had an eye exam in the past two years? yes    Do you see a dentist twice per year? no    Do you have sleep apnea, excessive snoring or daytime drowsiness?no      Reviewed and updated as needed this visit by clinical staff  Tobacco  Allergies  Meds  Problems  Soc Hx        Reviewed and updated as needed this visit by Provider  Allergies  Meds  Problems        Social History   Substance Use Topics     Smoking status: Never Smoker     Smokeless tobacco: Never Used     Alcohol use 0.6 - 1.2 oz/week     0 Standard drinks or equivalent, 1 - 2 Glasses of wine per week      Comment: 1-2  per week       The patient does not drink >3 drinks per day nor >7 drinks per week.    Today's PHQ-2 Score:   PHQ-2 ( 1999 Pfizer) 7/11/2017 3/14/2017   Q1: Little interest or pleasure in doing things 0 0   Q2: Feeling down, depressed or  hopeless 0 0   PHQ-2 Score 0 0       Do you feel safe in your environment - Yes    Do you have a Health Care Directive?: No: Advance care planning reviewed with patient; information given to patient to review.    Current providers sharing in care for this patient include:   Patient Care Team:  Stephan Cedeno MD as PCP - General (Internal Medicine)      Hearing impairment: No    Ability to successfully perform activities of daily living: Yes, no assistance needed     Fall risk:  Fallen 2 or more times in the past year?: No  Any fall with injury in the past year?: No    Home safety:  none identified      The following health maintenance items are reviewed in Epic and correct as of today:  Health Maintenance   Topic Date Due     EYE EXAM Q1 YEAR  12/14/1941     MEDICARE ANNUAL WELLNESS VISIT  12/14/1960     ADVANCE DIRECTIVE PLANNING Q5 YRS  12/14/1960     FOOT EXAM Q1 YEAR  03/03/2017     MICROALBUMIN Q1 YEAR  03/03/2017     CREATININE Q1 YEAR  03/11/2017     INFLUENZA VACCINE (SYSTEM ASSIGNED)  09/01/2017     A1C Q6 MO  09/14/2017     FALL RISK ASSESSMENT  11/30/2017     LIPID MONITORING Q1 YEAR  01/25/2018     TSH W/ FREE T4 REFLEX Q2 YEAR  03/03/2018     MARION QUESTIONNAIRE 1 YEAR  03/14/2018     PHQ-9 Q1YR  03/14/2018     COLON CANCER SCREEN (SYSTEM ASSIGNED)  01/20/2022     TETANUS IMMUNIZATION (SYSTEM ASSIGNED)  01/31/2023     PNEUMOCOCCAL  Completed     AORTIC ANEURYSM SCREENING (SYSTEM ASSIGNED)  Completed     Labs reviewed in EPIC      ROS:  Review of Systems   Constitutional: Negative for chills, fatigue, fever and unexpected weight change.   HENT: Negative for congestion, ear pain, hearing loss, sore throat and trouble swallowing.    Eyes: Negative for pain and visual disturbance.   Respiratory: Negative for cough and shortness of breath.    Cardiovascular: Negative for chest pain and palpitations.   Gastrointestinal: Negative for abdominal pain, blood in stool, constipation, diarrhea,  "nausea and vomiting.   Genitourinary: Negative for difficulty urinating and testicular pain.   Musculoskeletal: Negative for arthralgias, back pain, myalgias and neck pain.   Skin: Negative for rash.   Neurological: Negative for dizziness, light-headedness, numbness and headaches.   Psychiatric/Behavioral: Negative for dysphoric mood and sleep disturbance. The patient is not nervous/anxious.        OBJECTIVE:   /72 (BP Location: Left arm, Patient Position: Chair, Cuff Size: Adult Regular)  Pulse 68  Temp 97.6  F (36.4  C) (Oral)  Ht 5' 11\" (1.803 m)  Wt 220 lb 9.6 oz (100.1 kg)  SpO2 98%  BMI 30.77 kg/m2 Estimated body mass index is 30.77 kg/(m^2) as calculated from the following:    Height as of this encounter: 5' 11\" (1.803 m).    Weight as of this encounter: 220 lb 9.6 oz (100.1 kg).  EXAM:   Physical Exam   Constitutional: He is oriented to person, place, and time. No distress.   HENT:   Right Ear: External ear normal.   Left Ear: External ear normal.   Nose: Nose normal.   Mouth/Throat: Oropharynx is clear and moist. No oropharyngeal exudate.   Eyes: Conjunctivae are normal. Pupils are equal, round, and reactive to light.   Neck: Normal range of motion. Neck supple. No thyromegaly present.   Cardiovascular: Normal rate, regular rhythm and normal heart sounds.    Pulmonary/Chest: Effort normal and breath sounds normal. No respiratory distress.   Abdominal: Soft. There is no tenderness.   Genitourinary: Penis normal.   Musculoskeletal: Normal range of motion. He exhibits edema (trace bipedal). He exhibits no tenderness.   Lymphadenopathy:     He has no cervical adenopathy.   Neurological: He is alert and oriented to person, place, and time. Coordination normal.   Psychiatric: He has a normal mood and affect. Judgment normal.   Vitals reviewed.      ASSESSMENT / PLAN:       ICD-10-CM    1. Medicare annual wellness visit, subsequent Z00.00    2. Type 2 diabetes mellitus without complication, without " long-term current use of insulin (H)  UNCONTROLLED E11.9 metFORMIN (GLUCOPHAGE) 1000 MG tablet     glimepiride (AMARYL) 4 MG tablet   3. Coronary artery disease involving native coronary artery of native heart without angina pectoris I25.10    4. Prostate cancer (H) C61      **please refer to HPI for status of conditions      Patient Instructions   Reduce your calorie intake.    Return to lab in 3 months to recheck your diabetes control.        Preventive Health Recommendations:       Male Ages 65 and over    Yearly exam:             See your health care provider every year in order to  o   Review health changes.   o   Discuss preventive care.    o   Review your medicines if your doctor has prescribed any.    Talk with your health care provider about whether you should have a test to screen for prostate cancer (PSA).    Every 3 years, have a diabetes test (fasting glucose). If you are at risk for diabetes, you should have this test more often.    Every 5 years, have a cholesterol test. Have this test more often if you are at risk for high cholesterol or heart disease.     Every 10 years, have a colonoscopy. Or, have a yearly FIT test (stool test). These exams will check for colon cancer.    Talk to with your health care provider about screening for Abdominal Aortic Aneurysm if you have a family history of AAA or have a history of smoking.  Shots:     Get a flu shot each year.     Get a tetanus shot every 10 years.     Talk to your doctor about your pneumonia vaccines. There are now two you should receive - Pneumovax (PPSV 23) and Prevnar (PCV 13).    Talk to your doctor about a shingles vaccine.     Talk to your doctor about the hepatitis B vaccine.  Nutrition:     Eat at least 5 servings of fruits and vegetables each day.     Eat whole-grain bread, whole-wheat pasta and brown rice instead of white grains and rice.     Talk to your doctor about Calcium and Vitamin D.   Lifestyle    Exercise for at least 150 minutes  "a week (30 minutes a day, 5 days a week). This will help you control your weight and prevent disease.     Limit alcohol to one drink per day.     No smoking.     Wear sunscreen to prevent skin cancer.     See your dentist every six months for an exam and cleaning.     See your eye doctor every 1 to 2 years to screen for conditions such as glaucoma, macular degeneration and cataracts.      End of Life Planning:  Patient currently has an advanced directive: Yes.  Practitioner is supportive of decision.    COUNSELING:  Reviewed preventive health counseling, as reflected in patient instructions    Estimated body mass index is 30.77 kg/(m^2) as calculated from the following:    Height as of this encounter: 5' 11\" (1.803 m).    Weight as of this encounter: 220 lb 9.6 oz (100.1 kg).  Weight management plan: Discussed healthy diet and exercise guidelines and patient will follow up in 6 months in clinic to re-evaluate.   reports that he has never smoked. He has never used smokeless tobacco.      Appropriate preventive services were discussed with this patient, including applicable screening as appropriate for cardiovascular disease, diabetes, osteopenia/osteoporosis, and glaucoma.  As appropriate for age/gender, discussed screening for colorectal cancer, prostate cancer, breast cancer, and cervical cancer. Checklist reviewing preventive services available has been given to the patient.    Reviewed patients plan of care and provided an AVS. The Basic Care Plan (routine screening as documented in Health Maintenance) for Jaguar meets the Care Plan requirement. This Care Plan has been established and reviewed with the Patient.    Counseling Resources:  ATP IV Guidelines  Pooled Cohorts Equation Calculator  Breast Cancer Risk Calculator  FRAX Risk Assessment  ICSI Preventive Guidelines  Dietary Guidelines for Americans, 2010  USDA's MyPlate  ASA Prophylaxis  Lung CA Screening    Stephan Cedeno MD  [facility] " [facility]

## 2017-07-25 DIAGNOSIS — I21.4 NSTEMI (NON-ST ELEVATED MYOCARDIAL INFARCTION) (H): ICD-10-CM

## 2017-07-25 RX ORDER — CARVEDILOL 12.5 MG/1
12.5 TABLET ORAL 2 TIMES DAILY
Qty: 180 TABLET | Refills: 2 | Status: SHIPPED | OUTPATIENT
Start: 2017-07-25 | End: 2018-04-19

## 2017-08-24 DIAGNOSIS — E78.5 HYPERLIPIDEMIA LDL GOAL <100: ICD-10-CM

## 2017-08-24 RX ORDER — ATORVASTATIN CALCIUM 80 MG/1
80 TABLET, FILM COATED ORAL DAILY
Qty: 90 TABLET | Refills: 0 | Status: SHIPPED | OUTPATIENT
Start: 2017-08-24 | End: 2017-12-26

## 2017-08-24 NOTE — TELEPHONE ENCOUNTER
atorvastatin (LIPITOR) 80 MG tablet     Last Written Prescription Date: 5/24/17  Last Fill Quantity: 90, # refills: 0  Last Office Visit with [facility], [facility] or [facility] prescribing provider: 7/11/17  Next 5 appointments (look out 90 days)     Sep 05, 2017 10:45 AM CDT   Return Visit with Lg Mancilla MD   [facility] ([facility])    [address] [place] [id number] [phone]                   Lab Results   Component Value Date    CHOL 177 01/25/2017     Lab Results   Component Value Date    HDL 48 01/25/2017     Lab Results   Component Value Date    LDL 94 01/25/2017     Lab Results   Component Value Date    TRIG 174 01/25/2017     Lab Results   Component Value Date    CHOLHDLRATIO 3.9 05/27/2015           Alecia Baldwin RT(R)

## 2017-09-05 ENCOUNTER — OFFICE VISIT (OUTPATIENT)
Dept: CARDIOLOGY | Facility: CLINIC | Age: 77
End: 2017-09-05
Attending: INTERNAL MEDICINE
Payer: MEDICARE

## 2017-09-05 VITALS
BODY MASS INDEX: 31.1 KG/M2 | HEART RATE: 65 BPM | OXYGEN SATURATION: 96 % | DIASTOLIC BLOOD PRESSURE: 82 MMHG | WEIGHT: 223 LBS | SYSTOLIC BLOOD PRESSURE: 152 MMHG

## 2017-09-05 DIAGNOSIS — I10 BENIGN ESSENTIAL HYPERTENSION: ICD-10-CM

## 2017-09-05 DIAGNOSIS — I10 ESSENTIAL HYPERTENSION: ICD-10-CM

## 2017-09-05 DIAGNOSIS — E78.2 MIXED HYPERLIPIDEMIA: ICD-10-CM

## 2017-09-05 DIAGNOSIS — E11.8 TYPE 2 DIABETES MELLITUS WITH COMPLICATION, UNSPECIFIED LONG TERM INSULIN USE STATUS: ICD-10-CM

## 2017-09-05 DIAGNOSIS — I21.4 NSTEMI (NON-ST ELEVATED MYOCARDIAL INFARCTION) (H): ICD-10-CM

## 2017-09-05 DIAGNOSIS — E78.5 HYPERLIPIDEMIA LDL GOAL <100: ICD-10-CM

## 2017-09-05 LAB
ALT SERPL W P-5'-P-CCNC: <5 U/L (ref 5–30)
CHOLEST SERPL-MCNC: 150 MG/DL
HDLC SERPL-MCNC: 38 MG/DL
LDLC SERPL CALC-MCNC: 73 MG/DL
NONHDLC SERPL-MCNC: 112 MG/DL
TRIGL SERPL-MCNC: 196 MG/DL

## 2017-09-05 PROCEDURE — 36415 COLL VENOUS BLD VENIPUNCTURE: CPT | Performed by: INTERNAL MEDICINE

## 2017-09-05 PROCEDURE — 80061 LIPID PANEL: CPT | Performed by: INTERNAL MEDICINE

## 2017-09-05 PROCEDURE — 84460 ALANINE AMINO (ALT) (SGPT): CPT | Performed by: INTERNAL MEDICINE

## 2017-09-05 PROCEDURE — 99214 OFFICE O/P EST MOD 30 MIN: CPT | Performed by: INTERNAL MEDICINE

## 2017-09-05 NOTE — MR AVS SNAPSHOT
After Visit Summary   9/5/2017    Jaguar Tristan    MRN: [id number]           Patient Information     Date Of Birth          1940        Visit Information        Provider Department      9/5/2017 10:45 AM Lg Mancilla MD [facility]        Today's Diagnoses     Mixed hyperlipidemia        Benign essential hypertension        Essential hypertension        NSTEMI (non-ST elevated myocardial infarction) (H)        Hyperlipidemia LDL goal <100           Follow-ups after your visit        Additional Services     Follow-Up with Cardiologist                 Your next 10 appointments already scheduled     Oct 11, 2017  8:00 AM CDT   LAB with [facility]   [facility]    [address] [address] [phone]           Patient must bring picture ID. Patient should be prepared to give a urine specimen  Please do not eat 10-12 hours before your appointment if you are coming in fasting for labs on lipids, cholesterol, or glucose (sugar). Pregnant women should follow their Care Team instructions. Water with medications is okay. Do not drink coffee or other fluids. If you have concerns about taking  your medications, please ask at office or if scheduling via [employer], send a message by clicking on Secure Messaging, Message Your Care Team.              Future tests that were ordered for you today     Open Future Orders        Priority Expected Expires Ordered    Lipid Profile Routine 9/5/2018 9/5/2018 9/5/2017    ALT Routine 9/5/2018 9/5/2018 9/5/2017    Follow-Up with Cardiologist Routine 9/5/2018 9/6/2018 9/5/2017            Who to contact     If you have questions or need follow up information about today's clinic visit or your schedule please contact [facility] directly at [phone].  Normal or non-critical lab and imaging results will be communicated  "to you by Silecshart, letter or phone within 4 business days after the clinic has received the results. If you do not hear from us within 7 days, please contact the clinic through [employer] or phone. If you have a critical or abnormal lab result, we will notify you by phone as soon as possible.  Submit refill requests through [employer] or call your pharmacy and they will forward the refill request to us. Please allow 3 business days for your refill to be completed.          Additional Information About Your Visit        Liiiike Information     L[employer]ike lets you send messages to your doctor, view your test results, renew your prescriptions, schedule appointments and more. To sign up, go to [email or web address] . Click on \"Log in\" on the left side of the screen, which will take you to the Welcome page. Then click on \"Sign up Now\" on the right side of the page.     You will be asked to enter the access code listed below, as well as some personal information. Please follow the directions to create your username and password.     Your access code is: [email or web address]  Expires: 10/9/2017 10:59 AM     Your access code will  in 90 days. If you need help or a new code, please call your [facility] clinic or [phone].        Care EveryWhere ID     This is your Care EveryWhere ID. This could be used by other organizations to access your [facility] medical records  [id number]        Your Vitals Were     Pulse Pulse Oximetry BMI (Body Mass Index)             65 96% 31.1 kg/m2          Blood Pressure from Last 3 Encounters:   17 152/82   17 123/72   17 128/79    Weight from Last 3 Encounters:   17 101.2 kg (223 lb)   17 100.1 kg (220 lb 9.6 oz)   17 97.7 kg (215 lb 6.4 oz)              We Performed the Following     Follow-Up with Cardiologist        Primary Care Provider Office Phone # Fax #    Stephan Cedeno [phone] [address] " [place] [address]        Goals        General    Medication 1 (pt-stated)     Notes - Note edited  3/16/2016  9:36 AM by Barbara Davila, RN    I will carry my nitroglycerine with me wherever I go and take as prescribed if needed.  As of today's date 2/26/2016 goal is met at 0 - 25%.   Goal Status:  Ongoing  As of today's date 3/16/2016 goal is met at 51 - 75%.   Goal Status:  Ongoing            Equal Access to Services     GREG [facility] CenterNANDINI : Hadii aad ku hadasho Soomaali, waaxda luqadaha, qaybta kaalmada adeegyada, waxay idiin hayaan adeeg doreenmariamasilvina ladanis . So [facility] [phone].    ATENCIÓN: Si habla español, tiene a winslow disposición servicios gratuitos de asistencia lingüística. Llame al [phone].    We comply with applicable federal civil rights laws and [place] laws. We do not discriminate on the basis of race, color, national origin, age, disability sex, sexual orientation or gender identity.            Thank you!     Thank you for choosing [facility] [place]  for your care. Our goal is always to provide you with excellent care. Hearing back from our patients is one way we can continue to improve our services. Please take a few minutes to complete the written survey that you may receive in the mail after your visit with us. Thank you!             Your Updated Medication List - Protect others around you: Learn how to safely use, store and throw away your medicines at www.disposemymeds.org.          This list is accurate as of: 9/5/17 11:23 AM.  Always use your most recent med list.                   Brand Name Dispense Instructions for use Diagnosis    aspirin 81 MG tablet      Take 81 mg by mouth daily        atorvastatin 80 MG tablet    LIPITOR    90 tablet    Take 1 tablet (80 mg) by mouth daily    Hyperlipidemia LDL goal <100       blood glucose monitoring lancets     1 Box    Use to test blood sugar 4 times daily or as directed.  Ok to substitute alternative if insurance prefers.     Type 2 diabetes mellitus without complications (H)       blood glucose monitoring test strip    no brand specified    50 each    Accu-Chek Smartview test strips      Use to test blood sugars 4  times daily or as directed    Type 2 diabetes mellitus without complications (H)       carvedilol 12.5 MG tablet    COREG    180 tablet    Take 1 tablet (12.5 mg) by mouth 2 times daily    NSTEMI (non-ST elevated myocardial infarction) (H)       clobetasol propionate 0.05 % Lotn      Externally apply  topically. For hand and arm rash prn        desonide 0.05 % ointment    DESOWEN     Apply topically as needed Facial rash        glimepiride 4 MG tablet    AMARYL    60 tablet    Take 2 tablets (8 mg) by mouth every morning (before breakfast) 1 1/2 tablets once a day    Type 2 diabetes mellitus without complication, without long-term current use of insulin (H)       lisinopril-hydrochlorothiazide 20-12.5 MG per tablet    PRINZIDE/ZESTORETIC    90 tablet    Take 1 tablet by mouth daily    Essential hypertension       LUPRON IJ           metFORMIN 1000 MG tablet    GLUCOPHAGE    60 tablet    Take 1 tablet (1,000 mg) by mouth 2 times daily (with meals) Patient is due for office visit and/or labs before further refills.    Type 2 diabetes mellitus without complication, without long-term current use of insulin (H)       minocycline 50 MG capsule    MINOCIN/DYNACIN     Take 50 mg by mouth daily as needed Prn for rosacea        nitroGLYcerin 0.4 MG sublingual tablet    NITROSTAT    25 tablet    Place 1 tablet (0.4 mg) under the tongue every 5 minutes as needed for chest pain if you are still having symptoms after 3 doses (15 minutes) call 911.    NSTEMI (non-ST elevated myocardial infarction) (H)       OSTEO BI-FLEX REGULAR STRENGTH PO      Take 1 tablet by mouth daily        VITAMIN D3      3,000 Units daily

## 2017-09-05 NOTE — PROGRESS NOTES
REFERRING PHYSICIAN:  Dr. Stephan Cedeno.      HISTORY OF PRESENT ILLNESS:  It is my pleasure to see your patient, Jaguar Tristan, in followup.  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  He had 2 drug-eluting stents to the distal right coronary artery in 02/2016.  He also has a history of hypertension and hyperlipidemia.  He has type 2 diabetes mellitus and a diagnosis of prostate cancer and is taking Lupron injections.  Since I last saw him, he is doing relatively well.  He has no chest pains, chest pressure or shortness of breath.  He has gained a significant amount of weight since 11/2016.  He was 209 pounds at that stage.  Now, he is 223 pounds.  This is reflected in his lipid profile for his triglycerides have risen to 196 and his HDL has dropped to 38.  However, his LDL cholesterol has improved with an LDL dropping from 94 to 73.  However, when he was 209 pounds his LDL cholesterol was 47.      Initially his blood pressure was high today when he came in at 152/82 which is unusual for him.  His blood pressure is normally very well controlled.  For instance, on 07/11 of this year, his blood pressure was 123/72 and on 03/14 of this year, his blood pressure was 128/79.  He also has had his blood pressure checked at [place] and they have been in the normal range in the 120s.  I rechecked his blood pressure at the end of our visit and it had dropped into the normal range at 138/72, so I suspect that the initial blood pressure was an anomaly.  The liver function tests are normal which is important because he is on atorvastatin.  The ALT was less than 5.  His hemoglobin A1c I note is not in the normal range at 9.4 and this was drawn on 07/11 of this year.  This probably accounts for the rise in triglycerides also and the lowering of his HDL.      IMPRESSION:   1.  Coronary artery disease and status post  non-Q-wave inferior myocardial infarct.  The patient is asymptomatic with no symptoms of angina pectoris.   2.  Essential hypertension.  Initially, his blood pressure was high when he came in, but on recheck, his blood pressure was in the normal range and normally is quite well controlled.   3.  Reasonably good lipid profile.  His triglycerides are raised and his HDL is lowered probably because of poor glycemic control based upon his hemoglobin A1c and weight gain.   4.  Type 2 diabetes mellitus.      PLAN:  I have encouraged the patient to exercise and to try and lose weight, which would improve his triglycerides and HDL and also his blood pressure.      I will see the patient back again in 1 year's time, but I have told him to call me if he has any questions or any concerns at all.  It has been a pleasure to be involved in the care of this very nice patient.      cc:   [place] [address]         JOE TOWNSEND MD, [facility]             D: 2017 11:22   T: 2017 12:26   MT: ESTEFANÍA      Name:     ESTEFANIA HERNANDEZ   MRN:      [id number]        Account:      [id number]   :      1940           Service Date: 2017      Document: [id number]

## 2017-09-05 NOTE — PROGRESS NOTES
HPI and Plan:   See dictation    Orders Placed This Encounter   Procedures     Lipid Profile     ALT     Follow-Up with Cardiologist       No orders of the defined types were placed in this encounter.      There are no discontinued medications.      Encounter Diagnoses   Name Primary?     Mixed hyperlipidemia      Benign essential hypertension      Essential hypertension      NSTEMI (non-ST elevated myocardial infarction) (H)      Hyperlipidemia LDL goal <100        CURRENT MEDICATIONS:  Current Outpatient Prescriptions   Medication Sig Dispense Refill     atorvastatin (LIPITOR) 80 MG tablet Take 1 tablet (80 mg) by mouth daily 90 tablet 0     carvedilol (COREG) 12.5 MG tablet Take 1 tablet (12.5 mg) by mouth 2 times daily 180 tablet 2     metFORMIN (GLUCOPHAGE) 1000 MG tablet Take 1 tablet (1,000 mg) by mouth 2 times daily (with meals) Patient is due for office visit and/or labs before further refills. 60 tablet 3     glimepiride (AMARYL) 4 MG tablet Take 2 tablets (8 mg) by mouth every morning (before breakfast) 1 1/2 tablets once a day 60 tablet 3     lisinopril-hydrochlorothiazide (PRINZIDE/ZESTORETIC) 20-12.5 MG per tablet Take 1 tablet by mouth daily 90 tablet 0     Leuprolide Acetate (LUPRON IJ)        blood glucose monitoring (NO BRAND SPECIFIED) test strip Accu-Chek Smartview test strips      Use to test blood sugars 4  times daily or as directed 50 each 10     blood glucose monitoring (ACCU-CHEK FASTCLIX) lancets Use to test blood sugar 4 times daily or as directed.  Ok to substitute alternative if insurance prefers. 1 Box prn     Glucosamine-Chondroitin (OSTEO BI-FLEX REGULAR STRENGTH PO) Take 1 tablet by mouth daily       nitroglycerin (NITROSTAT) 0.4 MG SL tablet Place 1 tablet (0.4 mg) under the tongue every 5 minutes as needed for chest pain if you are still having symptoms after 3 doses (15 minutes) call 911. 25 tablet 1     minocycline (MINOCIN,DYNACIN) 50 MG capsule Take 50 mg by mouth daily as  needed Prn for rosacea       clobetasol propionate 0.05 % LOTN Externally apply  topically. For hand and arm rash prn       desonide (DESOWEN) 0.05 % ointment Apply topically as needed Facial rash       aspirin 81 MG tablet Take 81 mg by mouth daily        Cholecalciferol (VITAMIN D3) 3,000 Units daily          ALLERGIES     Allergies   Allergen Reactions     Other  [No Clinical Screening - See Comments]      PN: LW FI1: NKA LW FI2: nka  PN: LW CM1: Contrast Adverse Reaction - None Reaction :  PN: LW Other1: -NKA       PAST MEDICAL HISTORY:  Past Medical History:   Diagnosis Date     Diabetes mellitus (H) 2005     Elevated PSA 2009    bx neg Dr. Dill     Erectile dysfunction      History of colonoscopy 2012    nl     Hypercholesteremia 2002     Hypertension 2002     Nephrolithiasis 2007     Prostate cancer (H) 2013    surgery by Dr. Dudley, then had xrt     Rosacea        PAST SURGICAL HISTORY:  Past Surgical History:   Procedure Laterality Date     COLONOSCOPY  1/20/2012    Procedure:COLONOSCOPY; COLONOSCOPY; Surgeon:ELSY GARCIA; Location: GI     CORONARY ANGIOGRAPHY ADULT ORDER       HEART CATH, ANGIOPLASTY       LEG SURGERY  2005     robotic surgery for prostate cancer  2013    Dr. Dudley     TONSILLECTOMY  7       FAMILY HISTORY:  Family History   Problem Relation Age of Onset     CANCER Mother        SOCIAL HISTORY:  Social History     Social History     Marital status:      Spouse name: N/A     Number of children: N/A     Years of education: N/A     Occupational History     [employer]      Social History Main Topics     Smoking status: Never Smoker     Smokeless tobacco: Never Used     Alcohol use 0.6 - 1.2 oz/week     0 Standard drinks or equivalent, 1 - 2 Glasses of wine per week      Comment: 1-2  per week     Drug use: No     Sexual activity: Yes     Partners: Female     Other Topics Concern     Caffeine Concern No     no caffeine     Sleep Concern No     Stress Concern No      "Weight Concern No     Special Diet Yes     cut out pop and sodium     Exercise Yes     walking a lot, cardio rehab     Social History Narrative    so I said we could give him an Accu-Chek Simi from the clinic (I may drop it off with you on 3/2/16).             Review of Systems:  Skin:  Negative       Eyes:  Positive for glasses    ENT:  Negative epistaxis (got a bloody nose after having red wine)    Respiratory:  Positive for dyspnea on exertion     Cardiovascular:  Negative      Gastroenterology: Negative      Genitourinary:  Negative      Musculoskeletal:  Positive for joint pain;arthritis    Neurologic:  Negative      Psychiatric:  Negative      Heme/Lymph/Imm:  Negative      Endocrine:  Positive for diabetes;hot flashes      Physical Exam:  Vitals: /82  Pulse 65  Ht (P) 1.803 m (5' 11\")  Wt 101.2 kg (223 lb)  SpO2 96%  BMI (P) 31.1 kg/m2    Constitutional:  cooperative, alert and oriented, well developed, well nourished, in no acute distress overweight      Skin:  warm and dry to the touch, no apparent skin lesions or masses noted        Head:  normocephalic, no masses or lesions        Eyes:  pupils equal and round, conjunctivae and lids unremarkable, sclera white, no xanthalasma, EOMS intact, no nystagmus        ENT:  no pallor or cyanosis, dentition good        Neck:  carotid pulses are full and equal bilaterally, JVP normal, no carotid bruit, no thyromegaly        Chest:  normal breath sounds, clear to auscultation, normal A-P diameter, normal symmetry, normal respiratory excursion, no use of accessory muscles          Cardiac: regular rhythm;normal S1 and S2;apical impulse not displaced   S4 no presence of murmur            Abdomen:  abdomen soft, non-tender, BS normoactive, no mass, no HSM, no bruits        Vascular: pulses full and equal, no bruits auscultated                                        Extremities and Back:  no deformities, clubbing, cyanosis, erythema observed;no edema         " right radial site clean without hum or bruit    Neurological:  affect appropriate, oriented to time, person and place;no gross motor deficits              CC  Lg Mancilla MD  [address]  [place] [address]

## 2017-09-05 NOTE — LETTER
9/5/2017    Stephan Cedeno MD  [address] [facility] [address]  [facility] [address]    RE: Jaguar Tristan       Dear Colleague,    I had the pleasure of seeing Jaguar Tristan in the [facility].    REFERRING PHYSICIAN:  Dr. Stephan Cedeno.      It is my pleasure to see your patient, Jaguar Tristan, in followup.  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  He had 2 drug-eluting stents to the distal right coronary artery in 02/2016.  He also has a history of hypertension and hyperlipidemia.  He has type 2 diabetes mellitus and a diagnosis of prostate cancer and is taking Lupron injections.  Since I last saw him, he is doing relatively well.  He has no chest pains, chest pressure or shortness of breath.  He has gained a significant amount of weight since 11/2016.  He was 209 pounds at that stage.  Now, he is 223 pounds.  This is reflected in his lipid profile for his triglycerides have risen to 196 and his HDL has dropped to 38.  However, his LDL cholesterol has improved with an LDL dropping from 94 to 73.  However, when he was 209 pounds his LDL cholesterol was 47.      Initially his blood pressure was high today when he came in at 152/82 which is unusual for him.  His blood pressure is normally very well controlled.  For instance, on 07/11 of this year, his blood pressure was 123/72 and on 03/14 of this year, his blood pressure was 128/79.  He also has had his blood pressure checked at [facility] and they have been in the normal range in the 120s.  I rechecked his blood pressure at the end of our visit and it had dropped into the normal range at 138/72, so I suspect that the initial blood pressure was an anomaly.  The liver function tests are normal which is important because he is on atorvastatin.  The ALT was less than 5.  His hemoglobin A1c I note is not in the normal range at 9.4  and this was drawn on 07/11 of this year.  This probably accounts for the rise in triglycerides also and the lowering of his HDL.     Outpatient Encounter Prescriptions as of 9/5/2017   Medication Sig Dispense Refill     atorvastatin (LIPITOR) 80 MG tablet Take 1 tablet (80 mg) by mouth daily 90 tablet 0     carvedilol (COREG) 12.5 MG tablet Take 1 tablet (12.5 mg) by mouth 2 times daily 180 tablet 2     metFORMIN (GLUCOPHAGE) 1000 MG tablet Take 1 tablet (1,000 mg) by mouth 2 times daily (with meals) Patient is due for office visit and/or labs before further refills. 60 tablet 3     glimepiride (AMARYL) 4 MG tablet Take 2 tablets (8 mg) by mouth every morning (before breakfast) 1 1/2 tablets once a day 60 tablet 3     lisinopril-hydrochlorothiazide (PRINZIDE/ZESTORETIC) 20-12.5 MG per tablet Take 1 tablet by mouth daily 90 tablet 0     Leuprolide Acetate (LUPRON IJ)        blood glucose monitoring (NO BRAND SPECIFIED) test strip Accu-Chek Smartview test strips      Use to test blood sugars 4  times daily or as directed 50 each 10     blood glucose monitoring (ACCU-CHEK FASTCLIX) lancets Use to test blood sugar 4 times daily or as directed.  Ok to substitute alternative if insurance prefers. 1 Box prn     Glucosamine-Chondroitin (OSTEO BI-FLEX REGULAR STRENGTH PO) Take 1 tablet by mouth daily       nitroglycerin (NITROSTAT) 0.4 MG SL tablet Place 1 tablet (0.4 mg) under the tongue every 5 minutes as needed for chest pain if you are still having symptoms after 3 doses (15 minutes) call 911. 25 tablet 1     minocycline (MINOCIN,DYNACIN) 50 MG capsule Take 50 mg by mouth daily as needed Prn for rosacea       clobetasol propionate 0.05 % LOTN Externally apply  topically. For hand and arm rash prn       desonide (DESOWEN) 0.05 % ointment Apply topically as needed Facial rash       aspirin 81 MG tablet Take 81 mg by mouth daily        Cholecalciferol (VITAMIN D3) 3,000 Units daily        No facility-administered encounter  medications on file as of 9/5/2017.       IMPRESSION:   1.  Coronary artery disease and status post non-Q-wave inferior myocardial infarct.  The patient is asymptomatic with no symptoms of angina pectoris.   2.  Essential hypertension.  Initially, his blood pressure was high when he came in, but on recheck, his blood pressure was in the normal range and normally is quite well controlled.   3.  Reasonably good lipid profile.  His triglycerides are raised and his HDL is lowered probably because of poor glycemic control based upon his hemoglobin A1c and weight gain.   4.  Type 2 diabetes mellitus.      PLAN:  I have encouraged the patient to exercise and to try and lose weight, which would improve his triglycerides and HDL and also his blood pressure.      I will see the patient back again in 1 year's time, but I have told him to call me if he has any questions or any concerns at all.  It has been a pleasure to be involved in the care of this very nice patient.     Sincerely,    Lg Mancilla MD     [facility]

## 2017-09-06 NOTE — TELEPHONE ENCOUNTER
metFORMIN (GLUCOPHAGE) 1000 MG tablet           Last Written Prescription Date: 7/11/2017  Last Fill Quantity: 60, # refills: 3  Last Office Visit with G, P or [facility] prescribing provider:  7/11/2017        BP Readings from Last 3 Encounters:   09/05/17 152/82   07/11/17 123/72   03/14/17 128/79     Lab Results   Component Value Date    MICROL 8 03/03/2016     Lab Results   Component Value Date    UMALCR 4.29 03/03/2016     Creatinine   Date Value Ref Range Status   03/11/2016 1.18 0.70 - 1.30 mg/dL Final   ]  GFR Estimate   Date Value Ref Range Status   03/11/2016 64 >60 mL/min/1.7m2 Final   03/03/2016 79 >60 mL/min/1.7m2 Final     Comment:     Non  GFR Calc   02/23/2016 >90  Non  GFR Calc   >60 mL/min/1.7m2 Final     GFR Estimate If Black   Date Value Ref Range Status   03/11/2016 78 >60 mL/min/1.7m2 Final   03/03/2016 >90   GFR Calc   >60 mL/min/1.7m2 Final   02/23/2016 >90   GFR Calc   >60 mL/min/1.7m2 Final     Lab Results   Component Value Date    CHOL 150 09/05/2017     Lab Results   Component Value Date    HDL 38 09/05/2017     Lab Results   Component Value Date    LDL 73 09/05/2017     Lab Results   Component Value Date    TRIG 196 09/05/2017     Lab Results   Component Value Date    CHOLHDLRATIO 3.9 05/27/2015     Lab Results   Component Value Date    AST 10 03/03/2016     Lab Results   Component Value Date    ALT <5 09/05/2017     Lab Results   Component Value Date    A1C 9.4 07/11/2017    A1C 8.3 03/14/2017    A1C 8.3 11/30/2016    A1C 10.9 02/20/2016    A1C 7.4 03/07/2014     Potassium   Date Value Ref Range Status   03/11/2016 4.2 3.5 - 5.1 mmol/L Final

## 2017-09-06 NOTE — TELEPHONE ENCOUNTER
Routing to MD as:   Pharmacy requesting new Rx for 90 day supply (previously sent for 30 day).  Rx pended - please sign  Pt overdue for labs: GFR, creatinine, microalbumin     Liliana PERRY RN

## 2017-09-15 DIAGNOSIS — I10 ESSENTIAL HYPERTENSION: ICD-10-CM

## 2017-09-15 RX ORDER — LISINOPRIL AND HYDROCHLOROTHIAZIDE 12.5; 2 MG/1; MG/1
1 TABLET ORAL DAILY
Qty: 90 TABLET | Refills: 3 | Status: SHIPPED | OUTPATIENT
Start: 2017-09-15 | End: 2017-11-02

## 2017-10-11 DIAGNOSIS — I10 BENIGN ESSENTIAL HYPERTENSION: ICD-10-CM

## 2017-10-11 DIAGNOSIS — E78.5 HYPERLIPIDEMIA LDL GOAL <100: ICD-10-CM

## 2017-10-11 DIAGNOSIS — I10 ESSENTIAL HYPERTENSION: ICD-10-CM

## 2017-10-11 DIAGNOSIS — I21.4 NSTEMI (NON-ST ELEVATED MYOCARDIAL INFARCTION) (H): ICD-10-CM

## 2017-10-11 DIAGNOSIS — E78.2 MIXED HYPERLIPIDEMIA: ICD-10-CM

## 2017-10-11 LAB
ALT SERPL W P-5'-P-CCNC: 27 U/L (ref 0–70)
CHOLEST SERPL-MCNC: 141 MG/DL
HDLC SERPL-MCNC: 50 MG/DL
LDLC SERPL CALC-MCNC: 46 MG/DL
NONHDLC SERPL-MCNC: 91 MG/DL
TRIGL SERPL-MCNC: 226 MG/DL

## 2017-10-11 PROCEDURE — 80061 LIPID PANEL: CPT | Performed by: INTERNAL MEDICINE

## 2017-10-11 PROCEDURE — 84460 ALANINE AMINO (ALT) (SGPT): CPT | Performed by: INTERNAL MEDICINE

## 2017-10-11 PROCEDURE — 36415 COLL VENOUS BLD VENIPUNCTURE: CPT | Performed by: INTERNAL MEDICINE

## 2017-10-20 ENCOUNTER — TELEPHONE (OUTPATIENT)
Dept: CARDIOLOGY | Facility: CLINIC | Age: 77
End: 2017-10-20

## 2017-10-20 NOTE — TELEPHONE ENCOUNTER
Reviewed lipids/alt showing   Recent Labs   Lab Test  10/11/17   0810  09/05/17   0928   05/27/15   0726  04/03/14   0000   CHOL  141  150   < >  144   --    HDL  50  38*   < >  37*  38*   LDL  46  73   < >  79  83   TRIG  226*  196*   < >  142  116   CHOLHDLRATIO   --    --    --   3.9  3.8    < > = values in this interval not displayed.     Lab Results   Component Value Date    ALT 27 10/11/2017     Called pt with results. He is taking atorvastatin 80 mg daily without complications.   He follows a low fat, low carb diet, has not been exercising as much due to a bad hip but is doing PT & getting better & more active.   Pt advised to continue current meds & increase exercise as able. LPenfield RN

## 2017-11-02 DIAGNOSIS — I10 ESSENTIAL HYPERTENSION: ICD-10-CM

## 2017-11-02 RX ORDER — LISINOPRIL AND HYDROCHLOROTHIAZIDE 12.5; 2 MG/1; MG/1
1 TABLET ORAL DAILY
Qty: 90 TABLET | Refills: 3 | Status: SHIPPED | OUTPATIENT
Start: 2017-11-02 | End: 2018-10-22

## 2017-12-06 DIAGNOSIS — E11.8 TYPE 2 DIABETES MELLITUS WITH COMPLICATION, UNSPECIFIED LONG TERM INSULIN USE STATUS: ICD-10-CM

## 2017-12-26 DIAGNOSIS — E78.5 HYPERLIPIDEMIA LDL GOAL <100: ICD-10-CM

## 2017-12-26 NOTE — TELEPHONE ENCOUNTER
Requested Prescriptions   Pending Prescriptions Disp Refills     atorvastatin (LIPITOR) 80 MG tablet  Last Written Prescription Date:  8/24/17  Last Fill Quantity: 90 tablet,  # refills: 0   Last Office Visit with [facility], P or [facility] prescribing provider:  7/11/17 Cedeno  Future Office Visit:      90 tablet 0     Sig: Take 1 tablet (80 mg) by mouth daily    Statins Protocol Passed    12/26/2017  5:20 PM       Passed - LDL on file in past 12 months    Recent Labs   Lab Test  10/11/17   0810   LDL  46            Passed - No abnormal creatine kinase in past 12 months    No lab results found.         Passed - Recent or future visit with authorizing provider    Patient had office visit in the last year or has a visit in the next 30 days with authorizing provider.  See chart review.              Passed - Patient is age 18 or older

## 2017-12-28 RX ORDER — ATORVASTATIN CALCIUM 80 MG/1
80 TABLET, FILM COATED ORAL DAILY
Qty: 90 TABLET | Refills: 2 | Status: SHIPPED | OUTPATIENT
Start: 2017-12-28 | End: 2018-06-12

## 2018-01-04 DIAGNOSIS — E11.9 TYPE 2 DIABETES MELLITUS WITHOUT COMPLICATION, WITHOUT LONG-TERM CURRENT USE OF INSULIN (H): ICD-10-CM

## 2018-01-04 NOTE — TELEPHONE ENCOUNTER
Requested Prescriptions   Pending Prescriptions Disp Refills     metFORMIN (GLUCOPHAGE) 1000 MG tablet 60 tablet 3    Last Written Prescription Date:  12/07/17  Last Fill Quantity: 60 tablet,  # refills: 0   Last Office Visit with G, P or [facility] prescribing provider:  7/11/17 Cedeno   Future Office Visit:      Sig: Take 1 tablet (1,000 mg) by mouth 2 times daily (with meals) Patient is due for office visit and/or labs before further refills.    Biguanide Agents Failed    1/4/2018 11:03 AM       Failed - Patient's BP is less than 140/90    BP Readings from Last 3 Encounters:   09/05/17 152/82   07/11/17 123/72   03/14/17 128/79                Failed - Patient has had a Microalbumin in the past 12 mos.    Recent Labs   Lab Test  03/03/16   1518   MICROL  8   UMALCR  4.29            Failed - Patient has documented A1c within the specified period of time.    Recent Labs   Lab Test  07/11/17   1023   A1C  9.4*            Passed - Patient has documented LDL within the past 12 mos.    Recent Labs   Lab Test  10/11/17   0810   LDL  46            Passed - Patient is age 10 or older       Passed - Patient's CR is NOT>1.4 OR Patient's EGFR is NOT<45 within past 12 mos.    Recent Labs   Lab Test  03/11/16   1344   GFRESTIMATED  64   GFRESTBLACK  78       Recent Labs   Lab Test  03/11/16   1344   CR  1.18            Passed - Patient does NOT have a diagnosis of CHF.       Passed - Recent (6 mos) or future visit with authorizing provider's specialty    Patient had office visit in the last 6 months or has a visit in the next 30 days with authorizing provider.  See chart review.               *Note to pharmacy when list filled: 1 month refill only; patient due for appointment

## 2018-01-05 NOTE — TELEPHONE ENCOUNTER
Medication is being filled for 1 time refill only due to:  Patient needs to be seen because upcoming appt.     Next 5 appointments (look out 90 days)     Jan 30, 2018  9:30 AM CST   [facility]   [facility]    [address] [facility] [address]   [phone]                Lissett CALVILLO RN

## 2018-01-12 DIAGNOSIS — E11.9 TYPE 2 DIABETES MELLITUS WITHOUT COMPLICATION, WITHOUT LONG-TERM CURRENT USE OF INSULIN (H): ICD-10-CM

## 2018-01-12 NOTE — TELEPHONE ENCOUNTER
Requested Prescriptions   Pending Prescriptions Disp Refills     glimepiride (AMARYL) 4 MG tablet 60 tablet 3     Sig: Take 2 tablets (8 mg) by mouth every morning (before breakfast) 1 1/2 tablets once a day    There is no refill protocol information for this order        LOV:07/11/2017

## 2018-01-12 NOTE — TELEPHONE ENCOUNTER
Reason for Call:  Medication or medication refill:    Do you use a [facility]?  Name of the pharmacy and phone number for the current request:     [facility] [address] [place] [address] [address]        Name of the medication requested: glimepiride (AMARYL) 4 MG tablet     Other request: pt said that the pharmacy told him that he needs to be seen before it is refilled, however it is out and he does have an appointment to be seen on 1/30/18    Can we leave a detailed message on this number? YES    Phone number patient can be reached at: Cell number on file:    Telephone Information:   Mobile [phone]       Best Time: Any    Call taken on 1/12/2018 at 3:38 PM by Elizabeth Carpenter

## 2018-01-13 NOTE — TELEPHONE ENCOUNTER
"Requested Prescriptions   Pending Prescriptions Disp Refills     glimepiride (AMARYL) 4 MG tablet  Last Written Prescription Date:  7/11/17  Last Fill Quantity: 60 tablet,  # refills: 3   Last Office Visit with G, P or [facility] prescribing provider:  7/11/17 Pao   Future Office Visit:    Next 5 appointments (look out 90 days)     Jan 30, 2018  9:30 AM CST   [facility]    [address] [facility] [address]   [phone]                60 tablet 3     Sig: Take 2 tablets (8 mg) by mouth every morning (before breakfast) 1 1/2 tablets once a day    Sulfonylurea Agents Failed    1/12/2018  4:03 PM       Failed - Patient's BP is less than 140/90    BP Readings from Last 3 Encounters:   09/05/17 152/82   07/11/17 123/72   03/14/17 128/79          Failed - Patient has had a Microalbumin in the past 12 mos.    Recent Labs   Lab Test  03/03/16   1518   MICROL  8   UMALCR  4.29          Failed - Patient has documented A1c within the specified period of time.    Recent Labs   Lab Test  07/11/17   1023   A1C  9.4*          Failed - Patient has a recent creatinine (normal) within the past 12 mos.    Recent Labs   Lab Test  03/11/16   1344   CR  1.18          Passed - Patient has documented LDL within the past 12 mos.    Recent Labs   Lab Test  10/11/17   0810   LDL  46          Passed - Patient is age 18 or older       Passed - Patient has had an appointment with authorizing provider within the past 6 mos. or  within next 30 days    Patient had office visit in the last 6 months or has a visit in the next 30 days with authorizing provider.  See \"Patient Info\" tab in inbasket, or \"Choose Columns\" in Meds & Orders section of the refill encounter.             "

## 2018-01-15 RX ORDER — GLIMEPIRIDE 4 MG/1
8 TABLET ORAL
Qty: 60 TABLET | Refills: 0 | Status: SHIPPED | OUTPATIENT
Start: 2018-01-15 | End: 2018-01-30

## 2018-01-15 NOTE — TELEPHONE ENCOUNTER
Routing refill request to provider for review/approval because:  Need sig clarification. Med notes 9/5/19 note 2 tabs daily.  7/11/17 refills has 2 sigs. Pended 2 tabs daily, please review/sign if appropriate.  Thanks,  Katharine Castillo RN      Medication Notes           CORI MEDLEY Sep 5, 2017 10:31 AM (CDT)  Pt. Taking 2 tablets (8mg) daily

## 2018-01-16 NOTE — TELEPHONE ENCOUNTER
Overdue for follow up.  HgbA1c elevated on last visit.  Please schedule clinic visit and remind patient to come in fasting.

## 2018-01-29 DIAGNOSIS — E11.9 TYPE 2 DIABETES MELLITUS WITHOUT COMPLICATION, WITHOUT LONG-TERM CURRENT USE OF INSULIN (H): ICD-10-CM

## 2018-01-29 LAB — HBA1C MFR BLD: 11.8 % (ref 4.3–6)

## 2018-01-29 PROCEDURE — 83036 HEMOGLOBIN GLYCOSYLATED A1C: CPT | Performed by: INTERNAL MEDICINE

## 2018-01-29 PROCEDURE — 36415 COLL VENOUS BLD VENIPUNCTURE: CPT | Performed by: INTERNAL MEDICINE

## 2018-01-30 ENCOUNTER — TELEPHONE (OUTPATIENT)
Dept: FAMILY MEDICINE | Facility: CLINIC | Age: 78
End: 2018-01-30

## 2018-01-30 ENCOUNTER — OFFICE VISIT (OUTPATIENT)
Dept: FAMILY MEDICINE | Facility: CLINIC | Age: 78
End: 2018-01-30
Payer: MEDICARE

## 2018-01-30 VITALS
BODY MASS INDEX: 30.94 KG/M2 | WEIGHT: 221 LBS | HEIGHT: 71 IN | HEART RATE: 60 BPM | OXYGEN SATURATION: 97 % | TEMPERATURE: 97.8 F | DIASTOLIC BLOOD PRESSURE: 72 MMHG | SYSTOLIC BLOOD PRESSURE: 126 MMHG

## 2018-01-30 DIAGNOSIS — L30.9 ECZEMA, UNSPECIFIED TYPE: ICD-10-CM

## 2018-01-30 DIAGNOSIS — E11.9 TYPE 2 DIABETES MELLITUS WITHOUT COMPLICATION, WITHOUT LONG-TERM CURRENT USE OF INSULIN (H): Primary | ICD-10-CM

## 2018-01-30 DIAGNOSIS — I10 BENIGN ESSENTIAL HYPERTENSION: ICD-10-CM

## 2018-01-30 DIAGNOSIS — E11.9 TYPE 2 DIABETES MELLITUS WITHOUT COMPLICATION, WITHOUT LONG-TERM CURRENT USE OF INSULIN (H): ICD-10-CM

## 2018-01-30 PROCEDURE — 99214 OFFICE O/P EST MOD 30 MIN: CPT | Performed by: INTERNAL MEDICINE

## 2018-01-30 RX ORDER — BLOOD SUGAR DIAGNOSTIC
1 STRIP MISCELLANEOUS 2 TIMES DAILY
Qty: 100 EACH | Refills: 11 | Status: SHIPPED | OUTPATIENT
Start: 2018-01-30 | End: 2018-01-30

## 2018-01-30 RX ORDER — HYDROCORTISONE VALERATE 2 MG/G
OINTMENT TOPICAL
Qty: 15 G | Refills: 1 | Status: SHIPPED | OUTPATIENT
Start: 2018-01-30

## 2018-01-30 RX ORDER — TRIAMCINOLONE ACETONIDE 1 MG/G
CREAM TOPICAL
Qty: 30 G | Refills: 2 | Status: SHIPPED | OUTPATIENT
Start: 2018-01-30 | End: 2024-05-20

## 2018-01-30 RX ORDER — GLIMEPIRIDE 4 MG/1
8 TABLET ORAL
Qty: 180 TABLET | Refills: 0 | Status: SHIPPED | OUTPATIENT
Start: 2018-01-30 | End: 2018-05-19

## 2018-01-30 RX ORDER — BLOOD SUGAR DIAGNOSTIC
1 STRIP MISCELLANEOUS 2 TIMES DAILY
Qty: 100 EACH | Refills: 11 | Status: SHIPPED | OUTPATIENT
Start: 2018-01-30 | End: 2018-02-08

## 2018-01-30 ASSESSMENT — ENCOUNTER SYMPTOMS
DIARRHEA: 0
WEIGHT LOSS: 0
SHORTNESS OF BREATH: 0
VOMITING: 0
EYE PAIN: 0
PALPITATIONS: 0
HEADACHES: 0
SENSORY CHANGE: 0
NAUSEA: 0
COUGH: 0
ABDOMINAL PAIN: 0
CLAUDICATION: 0
DIAPHORESIS: 0
BLURRED VISION: 0
TINGLING: 0
DIZZINESS: 0
POLYDIPSIA: 0
FOCAL WEAKNESS: 0

## 2018-01-30 NOTE — PROGRESS NOTES
HPI    SUBJECTIVE:   Jaguar Tristan is a 77 year old male who presents to clinic today for the following health issues:      Diabetes Follow-up      Patient is checking blood sugars: not at all    Diabetic concerns: None     Symptoms of hypoglycemia (low blood sugar): none     Paresthesias (numbness or burning in feet) or sores: No     Date of last diabetic eye exam: 2016, due  ** Hemoglobin A1c yesterday was elevated at 11.8; patient has run out of glimepiride for 1 month      Hyperlipidemia Follow-Up      Rate your low fat/cholesterol diet?: not monitoring fat    Taking statin?  Yes, no muscle aches from statin    Other lipid medications/supplements?:  none      Hypertension Follow-up      Outpatient blood pressures are not being checked.    Low Salt Diet: limited    Amount of exercise or physical activity: walks, past hip pain    Problems taking medications regularly: No    Medication side effects: none    Diet: regular (no restrictions)  ** Blood pressure controlled on lisinopril/hydrochlorothiazide 20/12.5 mg daily and carvedilol 12.5 mg twice a day      Patient has facial and body eczema for which he was previously on prescription steroid creams. Patient needs a new prescription.      BP Readings from Last 2 Encounters:   01/30/18 126/72   09/05/17 152/82     Hemoglobin A1C (%)   Date Value   01/29/2018 11.8 (H)   07/11/2017 9.4 (H)     LDL Cholesterol Calculated (mg/dL)   Date Value   10/11/2017 46   09/05/2017 73       Past Medical History:   Diagnosis Date     Diabetes mellitus (H) 2005     Elevated PSA 2009    bx neg Dr. Dill     Erectile dysfunction      History of colonoscopy 2012    nl     Hypercholesteremia 2002     Hypertension 2002     Nephrolithiasis 2007     Prostate cancer (H) 2013    surgery by Dr. Dudley, then had xrt     Rosacea        Review of Systems   Constitutional: Negative for diaphoresis, malaise/fatigue and weight loss.   Eyes: Negative for blurred vision and pain.   Respiratory:  "Negative for cough and shortness of breath.    Cardiovascular: Negative for chest pain, palpitations, claudication and leg swelling.   Gastrointestinal: Negative for abdominal pain, diarrhea, nausea and vomiting.   Neurological: Negative for dizziness, tingling, sensory change, focal weakness and headaches.   Endo/Heme/Allergies: Negative for polydipsia.       /72 (BP Location: Left arm, Cuff Size: Adult Large)  Pulse 60  Temp 97.8  F (36.6  C) (Oral)  Ht 5' 11\" (1.803 m)  Wt 221 lb (100.2 kg)  SpO2 97%  BMI 30.82 kg/m2      Physical Exam   Constitutional: He is oriented to person, place, and time. No distress.   Eyes: Conjunctivae are normal. Pupils are equal, round, and reactive to light.   Neck: No thyromegaly present.   Cardiovascular: Normal rate, regular rhythm and normal heart sounds.    Pulmonary/Chest: Effort normal and breath sounds normal. No respiratory distress.   Musculoskeletal: He exhibits no edema.   Neurological: He is alert and oriented to person, place, and time. Coordination normal. GCS score is 15.   Skin: Rash (Eczematous rash at patient's chin and arms) noted.   Nursing note and vitals reviewed.        ICD-10-CM    1. Type 2 diabetes mellitus without complication, without long-term current use of insulin (H) E11.9 glimepiride (AMARYL) 4 MG tablet     metFORMIN (GLUCOPHAGE) 1000 MG tablet     blood glucose (NO BRAND SPECIFIED) lancets standard     blood glucose monitoring (NO BRAND SPECIFIED) test strip     Alcohol Swabs (ALCOHOL PADS) 70 % PADS   2. Benign essential hypertension I10 lisinopril/hydrochlorothiazide 20/12.5 mg daily and carvedilol 12.5 mg twice a day   3. Eczema, unspecified type L30.9 triamcinolone (KENALOG) 0.1 % cream     hydrocortisone valerate (WEST-ANA PAULA) 0.2 % ointment     **please refer to HPI for status of conditions      Patient Instructions   Use prescribed creams as directed.  Call doctor if your eczema persist/worsens.    Take your Metformin and " Glimepiride as directed.    Monitor your blood sugar twice a day (before breakfast and before dinner) and record in a small notebook (always bring this notebook with you during you clinic visits).  Call doctor if your blood sugar readings are not consistently between 100-140, or if they are greater than 200 or less than 80.    Visit your eye doctor.    Follow up in 2 months (come in fasting).

## 2018-01-30 NOTE — NURSING NOTE
"Chief Complaint   Patient presents with     Follow Up For     med refill     Diabetes     Hypertension     Lipids       Initial /72 (BP Location: Left arm, Cuff Size: Adult Large)  Pulse 60  Temp 97.8  F (36.6  C) (Oral)  Ht 5' 11\" (1.803 m)  Wt 221 lb (100.2 kg)  SpO2 97%  BMI 30.82 kg/m2 Estimated body mass index is 30.82 kg/(m^2) as calculated from the following:    Height as of this encounter: 5' 11\" (1.803 m).    Weight as of this encounter: 221 lb (100.2 kg).  Medication Reconciliation: complete   Brooke Damon MA  "

## 2018-01-30 NOTE — PATIENT INSTRUCTIONS
Use prescribed creams as directed.  Call doctor if your eczema persist/worsens.    Take your Metformin and Glimepiride as directed.    Monitor your blood sugar twice a day (before breakfast and before dinner) and record in a small notebook (always bring this notebook with you during you clinic visits).  Call doctor if your blood sugar readings are not consistently between 100-140, or if they are greater than 200 or less than 80.    Visit your eye doctor.    Follow up in 2 months (come in fasting).

## 2018-01-30 NOTE — TELEPHONE ENCOUNTER
Reason for Call:  Medication or medication refill:    Other request: Pharmacy needs Dx Codes for Meter, Test Strips and Lancets  To bill ins.    [facility] [phone]    Call taken on 1/30/2018 at 11:20 AM by Brigido Pinto

## 2018-01-30 NOTE — MR AVS SNAPSHOT
After Visit Summary   1/30/2018    Jaguar Tristan    MRN: [id number]           Patient Information     Date Of Birth          1940        Visit Information        Provider Department      1/30/2018 9:30 AM Stephan Cedeno MD [facility]        Today's Diagnoses     Screening for diabetic peripheral neuropathy    -  1    Type 2 diabetes mellitus without complication, without long-term current use of insulin (H)        Eczema, unspecified type          Care Instructions    Use prescribed creams as directed.  Call doctor if your eczema persist/worsens.    Take your Metformin and Glimepiride as directed.    Monitor your blood sugar twice a day (before breakfast and before dinner) and record in a small notebook (always bring this notebook with you during you clinic visits).  Call doctor if your blood sugar readings are not consistently between 100-140, or if they are greater than 200 or less than 80.    Visit your eye doctor.    Follow up in 2 months (come in fasting).          Follow-ups after your visit        Who to contact     If you have questions or need follow up information about today's clinic visit or your schedule please contact [facility] directly at [phone].  Normal or non-critical lab and imaging results will be communicated to you by MyChart, letter or phone within 4 business days after the clinic has received the results. If you do not hear from us within 7 days, please contact the clinic through MyChart or phone. If you have a critical or abnormal lab result, we will notify you by phone as soon as possible.  Submit refill requests through [employer] or call your pharmacy and they will forward the refill request to us. Please allow 3 business days for your refill to be completed.          Additional Information About Your Visit        Care EveryWhere ID     This is your Care EveryWhere ID. This could be used by other organizations to access  "your [place] medical records  [id number]        Your Vitals Were     Pulse Temperature Height Pulse Oximetry BMI (Body Mass Index)       60 97.8  F (36.6  C) (Oral) 5' 11\" (1.803 m) 97% 30.82 kg/m2        Blood Pressure from Last 3 Encounters:   01/30/18 126/72   09/05/17 152/82   07/11/17 123/72    Weight from Last 3 Encounters:   01/30/18 221 lb (100.2 kg)   09/05/17 223 lb (101.2 kg)   07/11/17 220 lb 9.6 oz (100.1 kg)              Today, you had the following     No orders found for display         Today's Medication Changes          These changes are accurate as of 1/30/18 10:11 AM.  If you have any questions, ask your nurse or doctor.               Start taking these medicines.        Dose/Directions    hydrocortisone valerate 0.2 % ointment   Commonly known as:  WEST-ANA PAULA   Used for:  Eczema, unspecified type   Started by:  Stephan Cedeno MD        Apply sparingly to affected area at face two times daily for no more than 14 days.   Quantity:  15 g   Refills:  1       triamcinolone 0.1 % cream   Commonly known as:  KENALOG   Used for:  Eczema, unspecified type   Started by:  Stephan Cedeno MD        Apply sparingly to affected area two times daily for no more than 14 days.  Do not apply to   Quantity:  30 g   Refills:  2         These medicines have changed or have updated prescriptions.        Dose/Directions    glimepiride 4 MG tablet   Commonly known as:  AMARYL   This may have changed:  additional instructions   Used for:  Type 2 diabetes mellitus without complication, without long-term current use of insulin (H)   Changed by:  Stephan Cedeno MD        Dose:  8 mg   Take 2 tablets (8 mg) by mouth every morning (before breakfast)   Quantity:  180 tablet   Refills:  0       metFORMIN 1000 MG tablet   Commonly known as:  GLUCOPHAGE   This may have changed:  Another medication with the same name was removed. Continue taking this medication, and follow " the directions you see here.   Used for:  Type 2 diabetes mellitus without complication, without long-term current use of insulin (H)   Changed by:  Stephan Cedeno MD        Dose:  1000 mg   Take 1 tablet (1,000 mg) by mouth 2 times daily (with meals)   Quantity:  180 tablet   Refills:  0            Where to get your medicines      These medications were sent to [place] [address] [facility] - [place], [place] - [address]  [address] [facility] [address]     Phone:  [phone]     glimepiride 4 MG tablet    hydrocortisone valerate 0.2 % ointment    metFORMIN 1000 MG tablet    triamcinolone 0.1 % cream                Primary Care Provider Office Phone # Fax #    Stephan Cedeno [phone] [address] [facility] [address]  [facility] [address]        Goals        General    Medication 1 (pt-stated)     Notes - Note edited  3/16/2016  9:36 AM by Barbara Davila RN    I will carry my nitroglycerine with me wherever I go and take as prescribed if needed.  As of today's date 2/26/2016 goal is met at 0 - 25%.   Goal Status:  Ongoing  As of today's date 3/16/2016 goal is met at 51 - 75%.   Goal Status:  Ongoing            Equal Access to Services     [facility] AH: Hadii aad ku hadasho Soomaali, waaxda luqadaha, qaybta kaalmada adeegyada, waxay arleen lyons . So [facility] [phone].    ATENCIÓN: Si habla español, tiene a winslow disposición servicios gratuitos de asistencia lingüística. Llame al [phone].    We comply with applicable federal civil rights laws and [place] laws. We do not discriminate on the basis of race, color, national origin, age, disability, sex, sexual orientation, or gender identity.            Thank you!     Thank you for choosing [facility]  for your care. Our goal is always to provide you with excellent care. Hearing back from our patients is one way we can continue to improve our services. Please take a  few minutes to complete the written survey that you may receive in the mail after your visit with us. Thank you!             Your Updated Medication List - Protect others around you: Learn how to safely use, store and throw away your medicines at www.disposemymeds.org.          This list is accurate as of 1/30/18 10:11 AM.  Always use your most recent med list.                   Brand Name Dispense Instructions for use Diagnosis    aspirin 81 MG tablet      Take 81 mg by mouth daily        atorvastatin 80 MG tablet    LIPITOR    90 tablet    Take 1 tablet (80 mg) by mouth daily    Hyperlipidemia LDL goal <100       blood glucose monitoring lancets     1 Box    Use to test blood sugar 4 times daily or as directed.  Ok to substitute alternative if insurance prefers.    Type 2 diabetes mellitus without complications (H)       blood glucose monitoring test strip    no brand specified    50 each    Accu-Chek Smartview test strips      Use to test blood sugars 4  times daily or as directed    Type 2 diabetes mellitus without complications (H)       carvedilol 12.5 MG tablet    COREG    180 tablet    Take 1 tablet (12.5 mg) by mouth 2 times daily    NSTEMI (non-ST elevated myocardial infarction) (H)       clobetasol propionate 0.05 % Lotn      Externally apply  topically. For hand and arm rash prn        desonide 0.05 % ointment    DESOWEN     Apply topically as needed Facial rash        glimepiride 4 MG tablet    AMARYL    180 tablet    Take 2 tablets (8 mg) by mouth every morning (before breakfast)    Type 2 diabetes mellitus without complication, without long-term current use of insulin (H)       hydrocortisone valerate 0.2 % ointment    WEST-ANA PAULA    15 g    Apply sparingly to affected area at face two times daily for no more than 14 days.    Eczema, unspecified type       lisinopril-hydrochlorothiazide 20-12.5 MG per tablet    PRINZIDE/ZESTORETIC    90 tablet    Take 1 tablet by mouth daily    Essential hypertension        LUPRON IJ           metFORMIN 1000 MG tablet    GLUCOPHAGE    180 tablet    Take 1 tablet (1,000 mg) by mouth 2 times daily (with meals)    Type 2 diabetes mellitus without complication, without long-term current use of insulin (H)       minocycline 50 MG capsule    MINOCIN/DYNACIN     Take 50 mg by mouth daily as needed Prn for rosacea        nitroGLYcerin 0.4 MG sublingual tablet    NITROSTAT    25 tablet    Place 1 tablet (0.4 mg) under the tongue every 5 minutes as needed for chest pain if you are still having symptoms after 3 doses (15 minutes) call 911.    NSTEMI (non-ST elevated myocardial infarction) (H)       OSTEO BI-FLEX REGULAR STRENGTH PO      Take 1 tablet by mouth daily        triamcinolone 0.1 % cream    KENALOG    30 g    Apply sparingly to affected area two times daily for no more than 14 days.  Do not apply to    Eczema, unspecified type       VITAMIN D3      3,000 Units daily

## 2018-01-30 NOTE — TELEPHONE ENCOUNTER
Please RE-send monitor, lancets and testing strips Rx's to the pharmacy as they state that the DX code was deleted.     Jayshree Del Cid MA

## 2018-02-07 NOTE — TELEPHONE ENCOUNTER
Please print these Rx's out and make sure the Dx code is visible so that we can fax them to pharmacy.   Jayshree Del Cid MA

## 2018-02-08 DIAGNOSIS — E11.9 TYPE 2 DIABETES MELLITUS WITHOUT COMPLICATION, WITHOUT LONG-TERM CURRENT USE OF INSULIN (H): ICD-10-CM

## 2018-02-08 RX ORDER — BLOOD SUGAR DIAGNOSTIC
1 STRIP MISCELLANEOUS 2 TIMES DAILY
Qty: 100 EACH | Refills: 11 | Status: SHIPPED | OUTPATIENT
Start: 2018-02-08

## 2018-04-19 DIAGNOSIS — I21.4 NSTEMI (NON-ST ELEVATED MYOCARDIAL INFARCTION) (H): ICD-10-CM

## 2018-04-19 RX ORDER — CARVEDILOL 12.5 MG/1
12.5 TABLET ORAL 2 TIMES DAILY
Qty: 180 TABLET | Refills: 2 | Status: SHIPPED | OUTPATIENT
Start: 2018-04-19 | End: 2019-05-13

## 2018-04-27 DIAGNOSIS — E11.9 TYPE 2 DIABETES MELLITUS WITHOUT COMPLICATION, WITHOUT LONG-TERM CURRENT USE OF INSULIN (H): ICD-10-CM

## 2018-04-27 NOTE — TELEPHONE ENCOUNTER
"Metformin 1000 mg    Last Written Prescription Date:  01/30/18  Last Fill Quantity: 1080 tablets,  # refills: 0   Last office visit: 1/30/2018 with prescribing provider:  Pao   Future Office Visit:      Requested Prescriptions   Pending Prescriptions Disp Refills     metFORMIN (GLUCOPHAGE) 1000 MG tablet 180 tablet 0     Sig: Take 1 tablet (1,000 mg) by mouth 2 times daily (with meals)    Biguanide Agents Failed    4/27/2018 12:01 PM       Failed - Patient has had a Microalbumin in the past 12 mos.    Recent Labs   Lab Test  03/03/16   1518   MICROL  8   UMALCR  4.29            Passed - Blood pressure less than 140/90 in past 6 months    BP Readings from Last 3 Encounters:   01/30/18 126/72   09/05/17 152/82   07/11/17 123/72                Passed - Patient has documented LDL within the past 12 mos.    Recent Labs   Lab Test  10/11/17   0810   LDL  46            Passed - Patient is age 10 or older       Passed - Patient has documented A1c within the specified period of time.    Recent Labs   Lab Test  01/29/18   0927   A1C  11.8*            Passed - Patient's CR is NOT>1.4 OR Patient's EGFR is NOT<45 within past 12 mos.    Recent Labs   Lab Test  03/11/16   1344   GFRESTIMATED  64   GFRESTBLACK  78       Recent Labs   Lab Test  03/11/16   1344   CR  1.18            Passed - Patient does NOT have a diagnosis of CHF.       Passed - Recent (6 mo) or future (30 days) visit within the authorizing provider's specialty    Patient had office visit in the last 6 months or has a visit in the next 30 days with authorizing provider or within the authorizing provider's specialty.  See \"Patient Info\" tab in inbasket, or \"Choose Columns\" in Meds & Orders section of the refill encounter.              "

## 2018-05-25 ENCOUNTER — TELEPHONE (OUTPATIENT)
Dept: FAMILY MEDICINE | Facility: CLINIC | Age: 78
End: 2018-05-25

## 2018-05-25 DIAGNOSIS — E11.9 TYPE 2 DIABETES MELLITUS WITHOUT COMPLICATION, WITHOUT LONG-TERM CURRENT USE OF INSULIN (H): ICD-10-CM

## 2018-05-25 NOTE — TELEPHONE ENCOUNTER
Last OV 1/30/18 with PCP, follow up 2 months (fasting)  Routing to TCs to contact patient to inform due for appointment. Please route back to refill pool once scheduled.     Liliana PERRY RN

## 2018-05-25 NOTE — TELEPHONE ENCOUNTER
Requested Prescriptions   Pending Prescriptions Disp Refills     metFORMIN (GLUCOPHAGE) 1000 MG tablet  Last Written Prescription Date:  04/27/2018  Last Fill Quantity: 60 tablet,  # refills: 0   Last Office Visit: 1/30/2018   Future Office Visit:    60 tablet 0     Sig: Take 1 tablet (1,000 mg) by mouth 2 times daily (with meals)    There is no refill protocol information for this order

## 2018-05-25 NOTE — TELEPHONE ENCOUNTER
"Requested Prescriptions   Pending Prescriptions Disp Refills     metFORMIN (GLUCOPHAGE) 1000 MG tablet 60 tablet 0     Sig: Take 1 tablet (1,000 mg) by mouth 2 times daily (with meals)    Biguanide Agents Failed    5/25/2018  2:12 PM       Failed - Patient has had a Microalbumin in the past 12 mos.    Recent Labs   Lab Test  03/03/16   1518   MICROL  8   UMALCR  4.29            Failed - Patient has documented A1c within the specified period of time.    If HgbA1C is 8 or greater, it needs to be on file within the past 3 months.  If less than 8, must be on file within the past 6 months.     Recent Labs   Lab Test  01/29/18   0927   A1C  11.8*            Passed - Blood pressure less than 140/90 in past 6 months    BP Readings from Last 3 Encounters:   01/30/18 126/72   09/05/17 152/82   07/11/17 123/72                Passed - Patient has documented LDL within the past 12 mos.    Recent Labs   Lab Test  10/11/17   0810   LDL  46            Passed - Patient is age 10 or older       Passed - Patient's CR is NOT>1.4 OR Patient's EGFR is NOT<45 within past 12 mos.    Recent Labs   Lab Test  03/11/16   1344   GFRESTIMATED  64   GFRESTBLACK  78       Recent Labs   Lab Test  03/11/16   1344   CR  1.18            Passed - Patient does NOT have a diagnosis of CHF.       Passed - Recent (6 mo) or future (30 days) visit within the authorizing provider's specialty    Patient had office visit in the last 6 months or has a visit in the next 30 days with authorizing provider or within the authorizing provider's specialty.  See \"Patient Info\" tab in inbasket, or \"Choose Columns\" in Meds & Orders section of the refill encounter.              "

## 2018-05-29 NOTE — TELEPHONE ENCOUNTER
To PCP:     Pt scheduled f/u visit. Please see pended refill request.     Routing refill request to provider for review/approval because:  Labs not current:  Micro albumin   Labs out of range: A1C    Marlen HENDERSON RN

## 2018-05-29 NOTE — TELEPHONE ENCOUNTER
Last A1C in January was 11.8  This patient must be scheduled  rx refilled for one mnth no refills until seen by PCP

## 2018-05-30 NOTE — TELEPHONE ENCOUNTER
TCs: Please call patient. He is due for diabetes follow up.   Medication was approved for 1 month.     Marcela Medellin RN

## 2018-06-05 ENCOUNTER — OFFICE VISIT (OUTPATIENT)
Dept: FAMILY MEDICINE | Facility: CLINIC | Age: 78
End: 2018-06-05
Payer: MEDICARE

## 2018-06-05 VITALS
SYSTOLIC BLOOD PRESSURE: 139 MMHG | OXYGEN SATURATION: 97 % | WEIGHT: 217 LBS | DIASTOLIC BLOOD PRESSURE: 71 MMHG | HEIGHT: 71 IN | BODY MASS INDEX: 30.38 KG/M2 | HEART RATE: 60 BPM

## 2018-06-05 DIAGNOSIS — Z13.89 SCREENING FOR DIABETIC PERIPHERAL NEUROPATHY: ICD-10-CM

## 2018-06-05 DIAGNOSIS — I10 BENIGN ESSENTIAL HYPERTENSION: ICD-10-CM

## 2018-06-05 DIAGNOSIS — E11.69 TYPE 2 DIABETES MELLITUS WITH OTHER SPECIFIED COMPLICATION, WITHOUT LONG-TERM CURRENT USE OF INSULIN (H): Primary | ICD-10-CM

## 2018-06-05 LAB
ANION GAP SERPL CALCULATED.3IONS-SCNC: 10 MMOL/L (ref 3–14)
BUN SERPL-MCNC: 17 MG/DL (ref 7–30)
CALCIUM SERPL-MCNC: 9.2 MG/DL (ref 8.5–10.1)
CHLORIDE SERPL-SCNC: 103 MMOL/L (ref 94–109)
CO2 SERPL-SCNC: 26 MMOL/L (ref 20–32)
CREAT SERPL-MCNC: 0.82 MG/DL (ref 0.66–1.25)
CREAT UR-MCNC: 50 MG/DL
GFR SERPL CREATININE-BSD FRML MDRD: >90 ML/MIN/1.7M2
GLUCOSE SERPL-MCNC: 316 MG/DL (ref 70–99)
HBA1C MFR BLD: 7.1 % (ref 0–5.6)
LDLC SERPL DIRECT ASSAY-MCNC: 59 MG/DL
MICROALBUMIN UR-MCNC: 5 MG/L
MICROALBUMIN/CREAT UR: 10.36 MG/G CR (ref 0–17)
POTASSIUM SERPL-SCNC: 4.4 MMOL/L (ref 3.4–5.3)
SODIUM SERPL-SCNC: 139 MMOL/L (ref 133–144)

## 2018-06-05 PROCEDURE — 83036 HEMOGLOBIN GLYCOSYLATED A1C: CPT | Performed by: INTERNAL MEDICINE

## 2018-06-05 PROCEDURE — 80048 BASIC METABOLIC PNL TOTAL CA: CPT | Performed by: INTERNAL MEDICINE

## 2018-06-05 PROCEDURE — 99214 OFFICE O/P EST MOD 30 MIN: CPT | Performed by: INTERNAL MEDICINE

## 2018-06-05 PROCEDURE — 99207 C FOOT EXAM  NO CHARGE: CPT | Mod: 25 | Performed by: INTERNAL MEDICINE

## 2018-06-05 PROCEDURE — 36415 COLL VENOUS BLD VENIPUNCTURE: CPT | Performed by: INTERNAL MEDICINE

## 2018-06-05 PROCEDURE — 82043 UR ALBUMIN QUANTITATIVE: CPT | Performed by: INTERNAL MEDICINE

## 2018-06-05 PROCEDURE — 83721 ASSAY OF BLOOD LIPOPROTEIN: CPT | Performed by: INTERNAL MEDICINE

## 2018-06-05 RX ORDER — GLIMEPIRIDE 4 MG/1
8 TABLET ORAL
Qty: 180 TABLET | Refills: 1 | Status: SHIPPED | OUTPATIENT
Start: 2018-06-05 | End: 2018-12-19

## 2018-06-05 ASSESSMENT — ENCOUNTER SYMPTOMS
SHORTNESS OF BREATH: 0
HEADACHES: 0
WEAKNESS: 0
NAUSEA: 0
VOMITING: 0
NUMBNESS: 0
PALPITATIONS: 0
LIGHT-HEADEDNESS: 0

## 2018-06-05 ASSESSMENT — ANXIETY QUESTIONNAIRES
3. WORRYING TOO MUCH ABOUT DIFFERENT THINGS: NOT AT ALL
6. BECOMING EASILY ANNOYED OR IRRITABLE: SEVERAL DAYS
5. BEING SO RESTLESS THAT IT IS HARD TO SIT STILL: NOT AT ALL
1. FEELING NERVOUS, ANXIOUS, OR ON EDGE: NOT AT ALL
IF YOU CHECKED OFF ANY PROBLEMS ON THIS QUESTIONNAIRE, HOW DIFFICULT HAVE THESE PROBLEMS MADE IT FOR YOU TO DO YOUR WORK, TAKE CARE OF THINGS AT HOME, OR GET ALONG WITH OTHER PEOPLE: NOT DIFFICULT AT ALL
GAD7 TOTAL SCORE: 1
2. NOT BEING ABLE TO STOP OR CONTROL WORRYING: NOT AT ALL
7. FEELING AFRAID AS IF SOMETHING AWFUL MIGHT HAPPEN: NOT AT ALL

## 2018-06-05 ASSESSMENT — PATIENT HEALTH QUESTIONNAIRE - PHQ9: 5. POOR APPETITE OR OVEREATING: NOT AT ALL

## 2018-06-05 NOTE — MR AVS SNAPSHOT
After Visit Summary   6/5/2018    Jaguar Tristan    MRN: [id number]           Patient Information     Date Of Birth          1940        Visit Information        Provider Department      6/5/2018 9:00 AM Stephan Cedeno MD [facility]        Today's Diagnoses     Type 2 diabetes mellitus with other specified complication, without long-term current use of insulin (H)    -  1    Benign essential hypertension        Screening for diabetic peripheral neuropathy        Type 2 diabetes mellitus without complication, without long-term current use of insulin (H)          Care Instructions    Monitor your blood sugar twice a day (before breakfast and before dinner) and record in a small notebook (always bring this notebook with you during you clinic visits).  Call doctor if your blood sugar readings are not consistently between 100-140, or if they are greater than 200 or less than 80.    Monitor your blood pressure twice a day (once you wake up and before bedtime).  Call doctor if:  -- your blood pressure for the top/upper number is greater than 140 or less than 90  -- your blood pressure for the bottom/lower number is greater than 90 or less than 60    Write your blood pressure readings on a small notebook and bring this notebook along with your blood pressure machine to your clinic visits.    Follow up with your eye doctor.    Follow up January 2019 (please come in fasting).          Follow-ups after your visit        Who to contact     If you have questions or need follow up information about today's clinic visit or your schedule please contact [facility] directly at [phone].  Normal or non-critical lab and imaging results will be communicated to you by MyChart, letter or phone within 4 business days after the clinic has received the results. If you do not hear from us within 7 days, please contact the clinic through MyChart or phone. If you have a critical  "or abnormal lab result, we will notify you by phone as soon as possible.  Submit refill requests through [employer] or call your pharmacy and they will forward the refill request to us. Please allow 3 business days for your refill to be completed.          Additional Information About Your Visit        [employer] Information     [employer] lets you send messages to your doctor, view your test results, renew your prescriptions, schedule appointments and more. To sign up, go to [email or web address] . Click on \"Log in\" on the left side of the screen, which will take you to the Welcome page. Then click on \"Sign up Now\" on the right side of the page.     You will be asked to enter the access code listed below, as well as some personal information. Please follow the directions to create your username and password.     Your access code is: [email or web address]  Expires: 9/3/2018  9:57 AM     Your access code will  in 90 days. If you need help or a new code, please call your [place] clinic or [phone].        Care EveryWhere ID     This is your Care EveryWhere ID. This could be used by other organizations to access your [place] medical records  [id number]        Your Vitals Were     Pulse Height Pulse Oximetry BMI (Body Mass Index)          60 5' 11\" (1.803 m) 97% 30.27 kg/m2         Blood Pressure from Last 3 Encounters:   18 139/71   18 126/72   17 152/82    Weight from Last 3 Encounters:   18 217 lb (98.4 kg)   18 221 lb (100.2 kg)   17 223 lb (101.2 kg)              We Performed the Following     Albumin Random Urine Quantitative with Creat Ratio     Basic metabolic panel     FOOT EXAM  NO CHARGE [06313.114]     Hemoglobin A1c     LDL cholesterol direct          Where to get your medicines      These medications were sent to [facility] [address] [place] [facility] [place] [address]  [address] [address]     Phone:  [phone]     glimepiride 4 MG tablet    " metFORMIN 1000 MG tablet          Primary Care Provider Office Phone # Fax #    Stephan Oziel Pierre Cedeno [phone] [address] [facility] [address]  [facility] [address]        Goals        General    Medication 1 (pt-stated)     Notes - Note edited  3/16/2016  9:36 AM by Barbara Davila, RN    I will carry my nitroglycerine with me wherever I go and take as prescribed if needed.  As of today's date 2/26/2016 goal is met at 0 - 25%.   Goal Status:  Ongoing  As of today's date 3/16/2016 goal is met at 51 - 75%.   Goal Status:  Ongoing            Equal Access to Services     BAR [facility] Center[facility]NDINI : Hadii iftikhar mast Sosina, waaxda luqadaha, qaybta kaalmada adenicoleyada, jeanie chaney. So [facility] [phone].    ATENCIÓN: Si habla español, tiene a winslow disposición servicios gratuitos de asistencia lingüística. Llame al [phone].    We comply with applicable federal civil rights laws and [place] laws. We do not discriminate on the basis of race, color, national origin, age, disability, sex, sexual orientation, or gender identity.            Thank you!     Thank you for choosing [facility]  for your care. Our goal is always to provide you with excellent care. Hearing back from our patients is one way we can continue to improve our services. Please take a few minutes to complete the written survey that you may receive in the mail after your visit with us. Thank you!             Your Updated Medication List - Protect others around you: Learn how to safely use, store and throw away your medicines at www.disposemymeds.org.          This list is accurate as of 6/5/18  9:57 AM.  Always use your most recent med list.                   Brand Name Dispense Instructions for use Diagnosis    Alcohol Pads 70 % Pads     100 each    1 pad 2 times daily For use after pricking finger for checking blood sugar at home.    Type 2 diabetes mellitus without complication, without  long-term current use of insulin (H)       aspirin 81 MG tablet      Take 81 mg by mouth daily        atorvastatin 80 MG tablet    LIPITOR    90 tablet    Take 1 tablet (80 mg) by mouth daily    Hyperlipidemia LDL goal <100       blood glucose lancets standard    no brand specified    100 each    Use to test blood sugar 2 times daily as directed.    Type 2 diabetes mellitus without complication, without long-term current use of insulin (H)       blood glucose monitoring test strip    no brand specified    100 strip    Use to test blood sugars 2 times daily as directed    Type 2 diabetes mellitus without complication, without long-term current use of insulin (H)       Blood Pressure Monitoring Kit     1 kit    Use to test blood sugars 2 times daily as directed    Type 2 diabetes mellitus without complication, without long-term current use of insulin (H)       carvedilol 12.5 MG tablet    COREG    180 tablet    Take 1 tablet (12.5 mg) by mouth 2 times daily    NSTEMI (non-ST elevated myocardial infarction) (H)       glimepiride 4 MG tablet    AMARYL    180 tablet    Take 2 tablets (8 mg) by mouth every morning (before breakfast) Please schedule follow-up with Dr. Cedeno    Type 2 diabetes mellitus without complication, without long-term current use of insulin (H)       hydrocortisone valerate 0.2 % ointment    WEST-ANA PAULA    15 g    Apply sparingly to affected area at face two times daily for no more than 14 days.    Eczema, unspecified type       lisinopril-hydrochlorothiazide 20-12.5 MG per tablet    PRINZIDE/ZESTORETIC    90 tablet    Take 1 tablet by mouth daily    Essential hypertension       LUPRON IJ           metFORMIN 1000 MG tablet    GLUCOPHAGE    180 tablet    Take 1 tablet (1,000 mg) by mouth 2 times daily (with meals)    Type 2 diabetes mellitus without complication, without long-term current use of insulin (H)       minocycline 50 MG capsule    MINOCIN/DYNACIN     Take 50 mg by mouth daily as needed Prn  for rosacea        nitroGLYcerin 0.4 MG sublingual tablet    NITROSTAT    25 tablet    Place 1 tablet (0.4 mg) under the tongue every 5 minutes as needed for chest pain if you are still having symptoms after 3 doses (15 minutes) call 911.    NSTEMI (non-ST elevated myocardial infarction) (H)       OSTEO BI-FLEX REGULAR STRENGTH PO      Take 1 tablet by mouth daily        triamcinolone 0.1 % cream    KENALOG    30 g    Apply sparingly to affected area two times daily for no more than 14 days.  Do not apply to    Eczema, unspecified type       VITAMIN D3      3,000 Units daily

## 2018-06-05 NOTE — PROGRESS NOTES
"  SUBJECTIVE:   Jaguar Tristan is a 77 year old male who presents to clinic today for the following health issues:      Diabetes Follow-up    I last saw the patient at the clinic on 1/30/2018.  His hemoglobin A1c was elevated at 11.8. He was out of his glimepiride for 1 month previously. Patient was advised to restart his glimepiride and continue his metformin.    Since his last clinic visit, his hemoglobin A1c has significantly improved and is 7.1 today. Patient is tolerating the medications well.      Hypertension Follow-up    Hypertension continues to be well controlled on carvedilol 12.5 mg twice a day and lisinopril/hydrochlorothiazide 20/12.5 mg daily.      BP Readings from Last 2 Encounters:   06/05/18 139/71   01/30/18 126/72     Hemoglobin A1C (%)   Date Value   06/05/2018 7.1 (H)   01/29/2018 11.8 (H)     LDL Cholesterol Calculated (mg/dL)   Date Value   10/11/2017 46   09/05/2017 73       Past Medical History:   Diagnosis Date     Diabetes mellitus (H) 2005     Elevated PSA 2009    bx neg Dr. Dill     Erectile dysfunction      History of colonoscopy 2012    nl     Hypercholesteremia 2002     Hypertension 2002     Nephrolithiasis 2007     Prostate cancer (H) 2013    surgery by Dr. Dudley, then had xrt     Rosacea        Review of Systems   Eyes: Negative for visual disturbance.   Respiratory: Negative for shortness of breath.    Cardiovascular: Negative for chest pain, palpitations and leg swelling.   Gastrointestinal: Negative for nausea and vomiting.   Neurological: Negative for weakness, light-headedness, numbness and headaches.       /71 (BP Location: Left arm, Patient Position: Chair, Cuff Size: Adult Regular)  Pulse 60  Ht 5' 11\" (1.803 m)  Wt 217 lb (98.4 kg)  SpO2 97%  BMI 30.27 kg/m2      Physical Exam   Constitutional: He is oriented to person, place, and time. No distress.   Neck: No thyromegaly present.   Cardiovascular: Normal rate, regular rhythm and normal heart sounds.  "   Pulmonary/Chest: Effort normal and breath sounds normal. No respiratory distress.   Musculoskeletal: He exhibits no edema.   Neurological: He is alert and oriented to person, place, and time.   Vitals reviewed.      Diabetic Foot Screen:  Any complaints of increased pain or numbness ? No  Is there a foot ulcer now or a history of foot ulcer? No  Does the foot have an abnormal shape? No  Are the nails thick, too long or ingrown?  YES   Are there any redness or open areas? No         Sensation Testing done at all points on the diagram with monofilament     Right Foot: Sensation Absent at the following points  and 9  Left Foot: Sensation Absent at the following points  and 9     Risk Category: 1- Loss of protective sensation with normal appearing foot (no weakness, deformity, callous, pre-ulcer or h/o ulceration)  Performed by Stephan Cedeno MD        ICD-10-CM    1. Type 2 diabetes mellitus with other specified complication, without long-term current use of insulin (H) E11.69 Albumin Random Urine Quantitative with Creat Ratio     Hemoglobin A1c     LDL cholesterol direct     glimepiride (AMARYL) 4 MG tablet     metFORMIN (GLUCOPHAGE) 1000 MG tablet   2. Benign essential hypertension I10 Basic metabolic panel   3. Screening for diabetic peripheral neuropathy Z13.89 FOOT EXAM  NO CHARGE [09616.114]         Patient Instructions   Monitor your blood sugar twice a day (before breakfast and before dinner) and record in a small notebook (always bring this notebook with you during you clinic visits).  Call doctor if your blood sugar readings are not consistently between 100-140, or if they are greater than 200 or less than 80.    Monitor your blood pressure twice a day (once you wake up and before bedtime).  Call doctor if:  -- your blood pressure for the top/upper number is greater than 140 or less than 90  -- your blood pressure for the bottom/lower number is greater than 90 or less than 60    Write your  blood pressure readings on a small notebook and bring this notebook along with your blood pressure machine to your clinic visits.    Follow up with your eye doctor.    Follow up January 2019 (please come in fasting).

## 2018-06-05 NOTE — PATIENT INSTRUCTIONS
Monitor your blood sugar twice a day (before breakfast and before dinner) and record in a small notebook (always bring this notebook with you during you clinic visits).  Call doctor if your blood sugar readings are not consistently between 100-140, or if they are greater than 200 or less than 80.    Monitor your blood pressure twice a day (once you wake up and before bedtime).  Call doctor if:  -- your blood pressure for the top/upper number is greater than 140 or less than 90  -- your blood pressure for the bottom/lower number is greater than 90 or less than 60    Write your blood pressure readings on a small notebook and bring this notebook along with your blood pressure machine to your clinic visits.    Follow up with your eye doctor.    Follow up January 2019 (please come in fasting).

## 2018-06-05 NOTE — LETTER
[facility]  [address] [facility]  [address]  [place]  [address]  Tel: [phone]    June 12, 2018    Jaguar Tristan  [address] [place] [address]        Good day to you Fr. Tristan.     Your metabolic profile shows that your electrolytes and kidney function are within normal limits (blood glucose is elevated but that is because you were not fasting during the blood test).     Your LDL cholesterol level is excellent.  Keep up the good work and continue taking your atorvastatin.     Your urine test shows that your diabetes has not caused significant kidney damage at this time, which is another reason why we need to keep your diabetes controlled to maintain your kidney healthy.     If you have any further questions or problems, please contact our office.      Sincerely,    Stephan Cedeno MD/nat    Results for orders placed or performed in visit on 06/05/18   Albumin Random Urine Quantitative with Creat Ratio   Result Value Ref Range    Creatinine Urine 50 mg/dL    Albumin Urine mg/L 5 mg/L    Albumin Urine mg/g Cr 10.36 0 - 17 mg/g Cr   Hemoglobin A1c   Result Value Ref Range    Hemoglobin A1C 7.1 (H) 0 - 5.6 %   Basic metabolic panel   Result Value Ref Range    Sodium 139 133 - 144 mmol/L    Potassium 4.4 3.4 - 5.3 mmol/L    Chloride 103 94 - 109 mmol/L    Carbon Dioxide 26 20 - 32 mmol/L    Anion Gap 10 3 - 14 mmol/L    Glucose 316 (H) 70 - 99 mg/dL    Urea Nitrogen 17 7 - 30 mg/dL    Creatinine 0.82 0.66 - 1.25 mg/dL    GFR Estimate >90 >60 mL/min/1.7m2    GFR Estimate If Black >90 >60 mL/min/1.7m2    Calcium 9.2 8.5 - 10.1 mg/dL   LDL cholesterol direct   Result Value Ref Range    LDL Cholesterol Direct 59 <100 mg/dL           Enclosure: Lab Results

## 2018-06-06 ASSESSMENT — ANXIETY QUESTIONNAIRES: GAD7 TOTAL SCORE: 1

## 2018-06-06 ASSESSMENT — PATIENT HEALTH QUESTIONNAIRE - PHQ9: SUM OF ALL RESPONSES TO PHQ QUESTIONS 1-9: 0

## 2018-06-12 DIAGNOSIS — E78.5 HYPERLIPIDEMIA LDL GOAL <100: ICD-10-CM

## 2018-06-12 RX ORDER — ATORVASTATIN CALCIUM 80 MG/1
80 TABLET, FILM COATED ORAL DAILY
Qty: 90 TABLET | Refills: 1 | Status: SHIPPED | OUTPATIENT
Start: 2018-06-12 | End: 2019-03-20

## 2018-08-22 ENCOUNTER — TRANSFERRED RECORDS (OUTPATIENT)
Dept: HEALTH INFORMATION MANAGEMENT | Facility: CLINIC | Age: 78
End: 2018-08-22

## 2018-10-22 DIAGNOSIS — I10 ESSENTIAL HYPERTENSION: ICD-10-CM

## 2018-10-22 RX ORDER — LISINOPRIL AND HYDROCHLOROTHIAZIDE 12.5; 2 MG/1; MG/1
1 TABLET ORAL DAILY
Qty: 90 TABLET | Refills: 0 | Status: SHIPPED | OUTPATIENT
Start: 2018-10-22 | End: 2019-05-29

## 2018-11-13 DIAGNOSIS — E11.69 TYPE 2 DIABETES MELLITUS WITH OTHER SPECIFIED COMPLICATION, WITHOUT LONG-TERM CURRENT USE OF INSULIN (H): ICD-10-CM

## 2018-11-13 NOTE — TELEPHONE ENCOUNTER
metFORMIN (GLUCOPHAGE) 1000 MG tablet 180 tablet 1 6/5/2018     Last Written Prescription Date:  6/5/18  Last Fill Quantity: 180,  # refills: 1   Last office visit: 6/5/2018 with prescribing provider:  Pao Duffy Office Visit:  none  Requested Prescriptions   Pending Prescriptions Disp Refills     metFORMIN (GLUCOPHAGE) 1000 MG tablet 180 tablet 1     Sig: Take 1 tablet (1,000 mg) by mouth 2 times daily (with meals)    There is no refill protocol information for this order        PHQ-9 SCORE 3/14/2017 6/5/2018   Total Score 0 0

## 2018-11-13 NOTE — TELEPHONE ENCOUNTER
Reason for Call:  Medication or medication refill:    Do you use a [facility]?  Name of the pharmacy and phone number for the current request:       [facility] [address] [place] [address] [address]        Name of the medication requested: metFORMIN (GLUCOPHAGE) 1000 MG tablet       Other request:     Can we leave a detailed message on this number? YES    Phone number patient can be reached at: Cell number on file:    Telephone Information:   Mobile [phone]       Best Time: any    Call taken on 11/13/2018 at 1:44 PM by Elizabeth Carpenter

## 2018-11-15 NOTE — TELEPHONE ENCOUNTER
"Requested Prescriptions   Pending Prescriptions Disp Refills     metFORMIN (GLUCOPHAGE) 1000 MG tablet 180 tablet 1     Sig: Take 1 tablet (1,000 mg) by mouth 2 times daily (with meals)    Biguanide Agents Passed    11/13/2018  2:32 PM       Passed - Blood pressure less than 140/90 in past 6 months    BP Readings from Last 3 Encounters:   06/05/18 139/71   01/30/18 126/72   09/05/17 152/82                Passed - Patient has documented LDL within the past 12 mos.    Recent Labs   Lab Test  06/05/18   0922   LDL  59            Passed - Patient has had a Microalbumin in the past 15 mos.    Recent Labs   Lab Test  06/05/18   0921   MICROL  5   UMALCR  10.36            Passed - Patient is age 10 or older       Passed - Patient has documented A1c within the specified period of time.    If HgbA1C is 8 or greater, it needs to be on file within the past 3 months.  If less than 8, must be on file within the past 6 months.     Recent Labs   Lab Test  06/05/18   0922   A1C  7.1*            Passed - Patient's CR is NOT>1.4 OR Patient's EGFR is NOT<45 within past 12 mos.    Recent Labs   Lab Test  06/05/18 0922   GFRESTIMATED  >90   GFRESTBLACK  >90       Recent Labs   Lab Test  06/05/18 0922   CR  0.82            Passed - Patient does NOT have a diagnosis of CHF.       Passed - Recent (6 mo) or future (30 days) visit within the authorizing provider's specialty    Patient had office visit in the last 6 months or has a visit in the next 30 days with authorizing provider or within the authorizing provider's specialty.  See \"Patient Info\" tab in inbasket, or \"Choose Columns\" in Meds & Orders section of the refill encounter.              "

## 2018-12-17 DIAGNOSIS — E11.69 TYPE 2 DIABETES MELLITUS WITH OTHER SPECIFIED COMPLICATION, WITHOUT LONG-TERM CURRENT USE OF INSULIN (H): ICD-10-CM

## 2018-12-18 NOTE — TELEPHONE ENCOUNTER
"Requested Prescriptions   Pending Prescriptions Disp Refills     glimepiride (AMARYL) 4 MG tablet  Last Written Prescription Date:  6/5/2018  Last Fill Quantity: 180 tablet,  # refills: 1   Last Office Visit: 6/5/2018   Future Office Visit:      180 tablet 1     Sig: Take 2 tablets (8 mg) by mouth every morning (before breakfast) Please schedule follow-up with Dr. Cedeno    Sulfonylurea Agents Failed - 12/17/2018  8:32 PM       Failed - Blood pressure less than 140/90 in past 6 months    BP Readings from Last 3 Encounters:   06/05/18 139/71   01/30/18 126/72   09/05/17 152/82                Failed - Patient has documented A1c within the specified period of time.    If HgbA1C is 8 or greater, it needs to be on file within the past 3 months.  If less than 8, must be on file within the past 6 months.     Recent Labs   Lab Test 06/05/18 0922   A1C 7.1*            Failed - Recent (6 mo) or future (30 days) visit within the authorizing provider's specialty    Patient had office visit in the last 6 months or has a visit in the next 30 days with authorizing provider or within the authorizing provider's specialty.  See \"Patient Info\" tab in inbasket, or \"Choose Columns\" in Meds & Orders section of the refill encounter.           Passed - Patient has documented LDL within the past 12 mos.    Recent Labs   Lab Test 06/05/18  0922   LDL 59            Passed - Patient has had a Microalbumin in the past 12 mos.    Recent Labs   Lab Test 06/05/18  0921   MICROL 5   UMALCR 10.36            Passed - Patient is age 18 or older       Passed - Patient has a recent creatinine (normal) within the past 12 mos.    Recent Labs   Lab Test 06/05/18 0922 12/05/14  0850   CR 0.82   < >  --    CREAT  --   --  1.0    < > = values in this interval not displayed.               "

## 2018-12-19 RX ORDER — GLIMEPIRIDE 4 MG/1
8 TABLET ORAL
Qty: 60 TABLET | Refills: 0 | Status: SHIPPED | OUTPATIENT
Start: 2018-12-19 | End: 2019-01-21

## 2018-12-19 NOTE — TELEPHONE ENCOUNTER
Medication is being filled for 1 time refill only due to:  Patient needs to be seen because due for appointment (diabetes f/u) .  Lissett CALVILLO RN

## 2019-01-19 DIAGNOSIS — E11.69 TYPE 2 DIABETES MELLITUS WITH OTHER SPECIFIED COMPLICATION, WITHOUT LONG-TERM CURRENT USE OF INSULIN (H): ICD-10-CM

## 2019-01-19 NOTE — TELEPHONE ENCOUNTER
"Last Written Prescription Date:  12/19/18  Last Fill Quantity: 60 tablet,  # refills: 0   Last office visit: 6/5/2018 with prescribing provider:  Poa   Future Office Visit:      Requested Prescriptions   Pending Prescriptions Disp Refills     glimepiride (AMARYL) 4 MG tablet 60 tablet 0     Sig: Take 2 tablets (8 mg) by mouth every morning (before breakfast)    Sulfonylurea Agents Failed - 1/19/2019 10:36 AM       Failed - Blood pressure less than 140/90 in past 6 months    BP Readings from Last 3 Encounters:   06/05/18 139/71   01/30/18 126/72   09/05/17 152/82                Failed - Patient has documented A1c within the specified period of time.    If HgbA1C is 8 or greater, it needs to be on file within the past 3 months.  If less than 8, must be on file within the past 6 months.     Recent Labs   Lab Test 06/05/18 0922   A1C 7.1*            Failed - Recent (6 mo) or future (30 days) visit within the authorizing provider's specialty    Patient had office visit in the last 6 months or has a visit in the next 30 days with authorizing provider or within the authorizing provider's specialty.  See \"Patient Info\" tab in inbasket, or \"Choose Columns\" in Meds & Orders section of the refill encounter.           Passed - Patient has documented LDL within the past 12 mos.    Recent Labs   Lab Test 06/05/18 0922   LDL 59            Passed - Patient has had a Microalbumin in the past 12 mos.    Recent Labs   Lab Test 06/05/18 0921   MICROL 5   UMALCR 10.36            Passed - Medication is active on med list       Passed - Patient is age 18 or older       Passed - Patient has a recent creatinine (normal) within the past 12 mos.    Recent Labs   Lab Test 06/05/18 0922 12/05/14  0850   CR 0.82   < >  --    CREAT  --   --  1.0    < > = values in this interval not displayed.               "

## 2019-01-21 RX ORDER — GLIMEPIRIDE 4 MG/1
8 TABLET ORAL
Qty: 60 TABLET | Refills: 0 | Status: SHIPPED | OUTPATIENT
Start: 2019-01-21 | End: 2019-03-08

## 2019-01-21 NOTE — TELEPHONE ENCOUNTER
Overdue for diabetes f/u  1 month Rx sent 12/19/2018    Medication is being filled for 1 time refill only due to:  upcoming appt     Next 5 appointments (look out 90 days)    Jan 31, 2019 10:00 AM CST  Office Visit with Stephan Cedeno MD  [facility] ([facility]) [address] [facility] [address]  [phone]        Lissett CALVILLO RN

## 2019-02-11 ENCOUNTER — OFFICE VISIT (OUTPATIENT)
Dept: CARDIOLOGY | Facility: CLINIC | Age: 79
End: 2019-02-11
Payer: MEDICARE

## 2019-02-11 VITALS
SYSTOLIC BLOOD PRESSURE: 126 MMHG | HEIGHT: 71 IN | DIASTOLIC BLOOD PRESSURE: 64 MMHG | HEART RATE: 68 BPM | WEIGHT: 220.8 LBS | BODY MASS INDEX: 30.91 KG/M2

## 2019-02-11 DIAGNOSIS — E78.2 MIXED HYPERLIPIDEMIA: ICD-10-CM

## 2019-02-11 DIAGNOSIS — I21.4 NSTEMI (NON-ST ELEVATED MYOCARDIAL INFARCTION) (H): ICD-10-CM

## 2019-02-11 DIAGNOSIS — I10 ESSENTIAL HYPERTENSION: Primary | ICD-10-CM

## 2019-02-11 DIAGNOSIS — I25.10 CORONARY ARTERY DISEASE INVOLVING NATIVE CORONARY ARTERY OF NATIVE HEART WITHOUT ANGINA PECTORIS: ICD-10-CM

## 2019-02-11 PROCEDURE — 99213 OFFICE O/P EST LOW 20 MIN: CPT | Performed by: INTERNAL MEDICINE

## 2019-02-11 ASSESSMENT — MIFFLIN-ST. JEOR: SCORE: 1753.67

## 2019-02-11 NOTE — LETTER
2/11/2019      Stephan Cedeno MD  [address] [facility] [address]  [facility] [address]      RE: Jaguar Tristan       Dear Colleague,    I had the pleasure of seeing Jaguar Tristan in the [facility].    Service Date: 02/11/2019      REFERRING PHYSICIAN:  Dr. Stephan Cedeno.        HISTORY OF PRESENT ILLNESS:  It is my pleasure to see your patient, Jaguar Tristan.  Jaguar Tristan is a very pleasant 78-year-old patient with a known history of coronary artery disease and history of a non-Q-wave myocardial infarct involving the inferior wall, for which he had 2 drug-eluting stents placed in the distal right coronary artery.  This was performed in 02/2016.  He has a history of hypertension, hyperlipidemia and type 2 diabetes mellitus.  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  He is on androgen suppressive therapy with Lupron.      With that background in mind, he has been doing well.  He gets occasional twinges of pain in the left upper chest area.  These are relieved by exercise and not brought on by exercise and only last a few seconds.  These sound distinctly non-cardiac.  His blood pressure is well controlled today at 126/64.  His last lipid profile was performed about 5 days ago with an HDL which is mildly reduced at 39 and good HDL of 67 and triglycerides of 136.  His total cholesterol is 133.  His electrolytes were normal with a potassium of 4.3 and a sodium of 137, BUN is 29 and creatinine is 0.83.  His hemoglobin A1c is 9.6.      IMPRESSION:   1.  Coronary artery disease.  The patient is asymptomatic with respect to coronary artery disease and the symptoms of chest discomfort that he described above sound distinctly noncardiac in that they are not brought on by exertion and are in fact relieved by exertion and are very short-lived.   2.  Borderline HDL deficiency.   3.  Type 2 diabetes mellitus  with relatively poor glycemic control, having a hemoglobin A1c of 9.6.   4.  Essential hypertension.  His blood pressure is well controlled.   5.  History of metastatic prostate cancer which appears to have responded very well to chemotherapy and also is on Lupron for androgen suppression.      PLAN:  I will continue the patient on his present medications.  I will see him back again in 1 year's time.  As always, he has been told to contact me if he has any questions or any concerns.  It has been my pleasure to be involved in the care of this very nice patient.      cc:   [place] [address]         JOE TOWNSEND MD, [facility]             D: 2019   T: 2019   MT: ESTEFANÍA      Name:     ESTEFANIA HERNANDEZ   MRN:      [id number]        Account:      [id number]   :      1940           Service Date: 2019      Document: [id number]         Outpatient Encounter Medications as of 2019   Medication Sig Dispense Refill     Alcohol Swabs (ALCOHOL PADS) 70 % PADS 1 pad 2 times daily For use after pricking finger for checking blood sugar at home. 100 each 11     aspirin 81 MG tablet Take 81 mg by mouth daily        atorvastatin (LIPITOR) 80 MG tablet Take 1 tablet (80 mg) by mouth daily 90 tablet 1     blood glucose (NO BRAND SPECIFIED) lancets standard Use to test blood sugar 2 times daily as directed. 100 each 11     blood glucose monitoring (NO BRAND SPECIFIED) test strip Use to test blood sugars 2 times daily as directed 100 strip 11     Blood Pressure Monitoring KIT Use to test blood sugars 2 times daily as directed 1 kit 0     calcium gluconate 500 MG tablet Take 600 mg by mouth 2 times daily Take once daily       carvedilol (COREG) 12.5 MG tablet Take 1 tablet (12.5 mg) by mouth 2 times daily 180 tablet 2     Cholecalciferol (VITAMIN D3) 3,000 Units daily        glimepiride (AMARYL) 4 MG tablet Take 2 tablets (8  mg) by mouth every morning (before breakfast) 60 tablet 0     Glucosamine-Chondroitin (OSTEO BI-FLEX REGULAR STRENGTH PO) Take 1 tablet by mouth daily       hydrocortisone valerate (WEST-ANA PAULA) 0.2 % ointment Apply sparingly to affected area at face two times daily for no more than 14 days. 15 g 1     Leuprolide Acetate (LUPRON IJ)        lisinopril-hydrochlorothiazide (PRINZIDE/ZESTORETIC) 20-12.5 MG per tablet Take 1 tablet by mouth daily 90 tablet 0     metFORMIN (GLUCOPHAGE) 1000 MG tablet Take 1 tablet (1,000 mg) by mouth 2 times daily (with meals) 60 tablet 0     minocycline (MINOCIN,DYNACIN) 50 MG capsule Take 50 mg by mouth daily as needed Prn for rosacea       nitroglycerin (NITROSTAT) 0.4 MG SL tablet Place 1 tablet (0.4 mg) under the tongue every 5 minutes as needed for chest pain if you are still having symptoms after 3 doses (15 minutes) call 911. 25 tablet 1     triamcinolone (KENALOG) 0.1 % cream Apply sparingly to affected area two times daily for no more than 14 days.  Do not apply to 30 g 2     No facility-administered encounter medications on file as of 2/11/2019.        Again, thank you for allowing me to participate in the care of your patient.      Sincerely,    Lg Mancilla MD, MD     [facility]

## 2019-02-11 NOTE — PROGRESS NOTES
Service Date: 02/11/2019      REFERRING PHYSICIAN:  Dr. Stephan Cedeno.        HISTORY OF PRESENT ILLNESS:  It is my pleasure to see your patient, Jaguar Tristan.  Jaguar Tristan is a very pleasant 78-year-old patient with a known history of coronary artery disease and history of a non-Q-wave myocardial infarct involving the inferior wall, for which he had 2 drug-eluting stents placed in the distal right coronary artery.  This was performed in 02/2016.  He has a history of hypertension, hyperlipidemia and type 2 diabetes mellitus.  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  He is on androgen suppressive therapy with Lupron.      With that background in mind, he has been doing well.  He gets occasional twinges of pain in the left upper chest area.  These are relieved by exercise and not brought on by exercise and only last a few seconds.  These sound distinctly non-cardiac.  His blood pressure is well controlled today at 126/64.  His last lipid profile was performed about 5 days ago with an HDL which is mildly reduced at 39 and good HDL of 67 and triglycerides of 136.  His total cholesterol is 133.  His electrolytes were normal with a potassium of 4.3 and a sodium of 137, BUN is 29 and creatinine is 0.83.  His hemoglobin A1c is 9.6.      IMPRESSION:   1.  Coronary artery disease.  The patient is asymptomatic with respect to coronary artery disease and the symptoms of chest discomfort that he described above sound distinctly noncardiac in that they are not brought on by exertion and are in fact relieved by exertion and are very short-lived.   2.  Borderline HDL deficiency.   3.  Type 2 diabetes mellitus with relatively poor glycemic control, having a hemoglobin A1c of 9.6.   4.  Essential hypertension.  His blood pressure is well controlled.   5.  History of metastatic prostate cancer which appears to have responded very well to  chemotherapy and also is on Lupron for androgen suppression.      PLAN:  I will continue the patient on his present medications.  I will see him back again in 1 year's time.  As always, he has been told to contact me if he has any questions or any concerns.  It has been my pleasure to be involved in the care of this very nice patient.      cc:   [place] [address]         JOE TOWNSEND MD, [facility]             D: 2019   T: 2019   MT: ESTEFANÍA      Name:     ESTEFANIA HERNANDEZ   MRN:      [id number]        Account:      [id number]   :      1940           Service Date: 2019      Document: [id number]

## 2019-02-11 NOTE — LETTER
2/11/2019    Stephan Cedeno MD  [address] [facility] [address]  [facility] [address]    RE: Jaguar Tristan       Dear Colleague,    I had the pleasure of seeing Jaguar Tristan in the [facility].    HPI and Plan:   See dictation    Orders Placed This Encounter   Procedures     Basic metabolic panel     Lipid Profile     ALT     Follow-Up with Cardiologist       No orders of the defined types were placed in this encounter.      There are no discontinued medications.      Encounter Diagnoses   Name Primary?     Essential hypertension Yes     Mixed hyperlipidemia      Coronary artery disease involving native coronary artery of native heart without angina pectoris      NSTEMI (non-ST elevated myocardial infarction) (H)        CURRENT MEDICATIONS:  Current Outpatient Medications   Medication Sig Dispense Refill     Alcohol Swabs (ALCOHOL PADS) 70 % PADS 1 pad 2 times daily For use after pricking finger for checking blood sugar at home. 100 each 11     aspirin 81 MG tablet Take 81 mg by mouth daily        atorvastatin (LIPITOR) 80 MG tablet Take 1 tablet (80 mg) by mouth daily 90 tablet 1     blood glucose (NO BRAND SPECIFIED) lancets standard Use to test blood sugar 2 times daily as directed. 100 each 11     blood glucose monitoring (NO BRAND SPECIFIED) test strip Use to test blood sugars 2 times daily as directed 100 strip 11     Blood Pressure Monitoring KIT Use to test blood sugars 2 times daily as directed 1 kit 0     calcium gluconate 500 MG tablet Take 600 mg by mouth 2 times daily Take once daily       carvedilol (COREG) 12.5 MG tablet Take 1 tablet (12.5 mg) by mouth 2 times daily 180 tablet 2     Cholecalciferol (VITAMIN D3) 3,000 Units daily        glimepiride (AMARYL) 4 MG tablet Take 2 tablets (8 mg) by mouth every morning (before breakfast) 60 tablet 0     Glucosamine-Chondroitin (OSTEO BI-FLEX REGULAR STRENGTH PO) Take 1 tablet by mouth daily       hydrocortisone valerate  (WEST-ANA PAULA) 0.2 % ointment Apply sparingly to affected area at face two times daily for no more than 14 days. 15 g 1     Leuprolide Acetate (LUPRON IJ)        lisinopril-hydrochlorothiazide (PRINZIDE/ZESTORETIC) 20-12.5 MG per tablet Take 1 tablet by mouth daily 90 tablet 0     metFORMIN (GLUCOPHAGE) 1000 MG tablet Take 1 tablet (1,000 mg) by mouth 2 times daily (with meals) 60 tablet 0     minocycline (MINOCIN,DYNACIN) 50 MG capsule Take 50 mg by mouth daily as needed Prn for rosacea       nitroglycerin (NITROSTAT) 0.4 MG SL tablet Place 1 tablet (0.4 mg) under the tongue every 5 minutes as needed for chest pain if you are still having symptoms after 3 doses (15 minutes) call 911. 25 tablet 1     triamcinolone (KENALOG) 0.1 % cream Apply sparingly to affected area two times daily for no more than 14 days.  Do not apply to 30 g 2       ALLERGIES     Allergies   Allergen Reactions     Other  [No Clinical Screening - See Comments]      PN: LW FI1: NKA LW FI2: nka  PN: LW CM1: Contrast Adverse Reaction - None Reaction :  PN: LW Other1: -NKA       PAST MEDICAL HISTORY:  Past Medical History:   Diagnosis Date     Coronary artery disease 02/22/2016    Cardiac cath 2016: JAYME x2 to RCA     Diabetes mellitus (H) 2005     Elevated PSA 2009    bx neg Dr. Dill     Erectile dysfunction      History of colonoscopy 2012    nl     Hypercholesteremia 2002     Hypertension 2002     Nephrolithiasis 2007     Prostate cancer (H) 2013    chemotherapy for metastasis to spine 2016 (cleared), prostatectomy and radiation 2013     Rosacea        PAST SURGICAL HISTORY:  Past Surgical History:   Procedure Laterality Date     COLONOSCOPY  1/20/2012    Procedure:COLONOSCOPY; COLONOSCOPY; Surgeon:ELSY GARCIA; Location: GI     CORONARY ANGIOGRAPHY ADULT ORDER       HEART CATH, ANGIOPLASTY  02/22/2016    JAYME x2 to RCA     LEG SURGERY  2005     robotic surgery for prostate cancer  2013    Dr. Dudley     TONSILLECTOMY  5       FAMILY  HISTORY:  Family History   Problem Relation Age of Onset     Cancer Mother        SOCIAL HISTORY:  Social History     Socioeconomic History     Marital status:      Spouse name: None     Number of children: None     Years of education: None     Highest education level: None   Social Needs     Financial resource strain: None     Food insecurity - worry: None     Food insecurity - inability: None     Transportation needs - medical: None     Transportation needs - non-medical: None   Occupational History     Occupation: [employer]   Tobacco Use     Smoking status: Never Smoker     Smokeless tobacco: Never Used   Substance and Sexual Activity     Alcohol use: Yes     Alcohol/week: 0.6 - 1.2 oz     Types: 1 - 2 Glasses of wine per week     Comment: 3-4 glasses wine per week     Drug use: No     Sexual activity: Yes     Partners: Female   Other Topics Concern     Parent/sibling w/ CABG, MI or angioplasty before 65F 55M? Not Asked      Service Not Asked     Blood Transfusions Not Asked     Caffeine Concern No     Comment: no caffeine     Occupational Exposure Not Asked     Hobby Hazards Not Asked     Sleep Concern No     Stress Concern No     Weight Concern No     Special Diet Yes     Comment: cut out pop and sodium     Back Care Not Asked     Exercise Yes     Comment: walking a lot, cardio rehab     Bike Helmet Not Asked     Seat Belt Not Asked     Self-Exams Not Asked   Social History Narrative    so I said we could give him an Accu-Chek Simi from the clinic (I may drop it off with you on 3/2/16).             Review of Systems:  Skin:  Negative       Eyes:  Positive for glasses    ENT:  Negative      Respiratory:  Negative       Cardiovascular:    lightheadedness;Positive for occ in the AM when gets up  Gastroenterology: Negative      Genitourinary:  Negative      Musculoskeletal:  Positive for arthritis;joint pain    Neurologic:  Negative      Psychiatric:  Negative      Heme/Lymph/Imm:  Negative     "  Endocrine:  Positive for diabetes;hot flashes      Physical Exam:  Vitals: /64   Pulse 68   Ht 1.803 m (5' 11\")   Wt 100.2 kg (220 lb 12.8 oz)   BMI 30.80 kg/m       Constitutional:  cooperative, alert and oriented, well developed, well nourished, in no acute distress overweight      Skin:  warm and dry to the touch, no apparent skin lesions or masses noted          Head:  normocephalic, no masses or lesions        Eyes:  pupils equal and round, conjunctivae and lids unremarkable, sclera white, no xanthalasma, EOMS intact, no nystagmus        Lymph:      ENT:  no pallor or cyanosis, dentition good        Neck:           Respiratory:  normal breath sounds, clear to auscultation, normal A-P diameter, normal symmetry, normal respiratory excursion, no use of accessory muscles         Cardiac: regular rhythm;normal S1 and S2;apical impulse not displaced   S4 no presence of murmur          pulses full and equal, no bruits auscultated                                        GI:  abdomen soft, non-tender, BS normoactive, no mass, no HSM, no bruits        Extremities and Muscular Skeletal:  no deformities, clubbing, cyanosis, erythema observed;no edema         right radial site clean without hum or bruit    Neurological:  no gross motor deficits;affect appropriate        Psych:  Alert and Oriented x 3        CC  No referring provider defined for this encounter.                Thank you for allowing me to participate in the care of your patient.      Sincerely,     Lg Mancilla MD, MD     [facility]    cc:   No referring provider defined for this encounter.        "

## 2019-02-11 NOTE — PROGRESS NOTES
HPI and Plan:   See dictation    Orders Placed This Encounter   Procedures     Basic metabolic panel     Lipid Profile     ALT     Follow-Up with Cardiologist       No orders of the defined types were placed in this encounter.      There are no discontinued medications.      Encounter Diagnoses   Name Primary?     Essential hypertension Yes     Mixed hyperlipidemia      Coronary artery disease involving native coronary artery of native heart without angina pectoris      NSTEMI (non-ST elevated myocardial infarction) (H)        CURRENT MEDICATIONS:  Current Outpatient Medications   Medication Sig Dispense Refill     Alcohol Swabs (ALCOHOL PADS) 70 % PADS 1 pad 2 times daily For use after pricking finger for checking blood sugar at home. 100 each 11     aspirin 81 MG tablet Take 81 mg by mouth daily        atorvastatin (LIPITOR) 80 MG tablet Take 1 tablet (80 mg) by mouth daily 90 tablet 1     blood glucose (NO BRAND SPECIFIED) lancets standard Use to test blood sugar 2 times daily as directed. 100 each 11     blood glucose monitoring (NO BRAND SPECIFIED) test strip Use to test blood sugars 2 times daily as directed 100 strip 11     Blood Pressure Monitoring KIT Use to test blood sugars 2 times daily as directed 1 kit 0     calcium gluconate 500 MG tablet Take 600 mg by mouth 2 times daily Take once daily       carvedilol (COREG) 12.5 MG tablet Take 1 tablet (12.5 mg) by mouth 2 times daily 180 tablet 2     Cholecalciferol (VITAMIN D3) 3,000 Units daily        glimepiride (AMARYL) 4 MG tablet Take 2 tablets (8 mg) by mouth every morning (before breakfast) 60 tablet 0     Glucosamine-Chondroitin (OSTEO BI-FLEX REGULAR STRENGTH PO) Take 1 tablet by mouth daily       hydrocortisone valerate (WEST-ANA PAULA) 0.2 % ointment Apply sparingly to affected area at face two times daily for no more than 14 days. 15 g 1     Leuprolide Acetate (LUPRON IJ)        lisinopril-hydrochlorothiazide (PRINZIDE/ZESTORETIC) 20-12.5 MG per tablet  Take 1 tablet by mouth daily 90 tablet 0     metFORMIN (GLUCOPHAGE) 1000 MG tablet Take 1 tablet (1,000 mg) by mouth 2 times daily (with meals) 60 tablet 0     minocycline (MINOCIN,DYNACIN) 50 MG capsule Take 50 mg by mouth daily as needed Prn for rosacea       nitroglycerin (NITROSTAT) 0.4 MG SL tablet Place 1 tablet (0.4 mg) under the tongue every 5 minutes as needed for chest pain if you are still having symptoms after 3 doses (15 minutes) call 911. 25 tablet 1     triamcinolone (KENALOG) 0.1 % cream Apply sparingly to affected area two times daily for no more than 14 days.  Do not apply to 30 g 2       ALLERGIES     Allergies   Allergen Reactions     Other  [No Clinical Screening - See Comments]      PN: LW FI1: NKA LW FI2: nka  PN: LW CM1: Contrast Adverse Reaction - None Reaction :  PN: LW Other1: -NKA       PAST MEDICAL HISTORY:  Past Medical History:   Diagnosis Date     Coronary artery disease 02/22/2016    Cardiac cath 2016: JAYME x2 to RCA     Diabetes mellitus (H) 2005     Elevated PSA 2009    bx neg Dr. Dill     Erectile dysfunction      History of colonoscopy 2012    nl     Hypercholesteremia 2002     Hypertension 2002     Nephrolithiasis 2007     Prostate cancer (H) 2013    chemotherapy for metastasis to spine 2016 (cleared), prostatectomy and radiation 2013     Rosacea        PAST SURGICAL HISTORY:  Past Surgical History:   Procedure Laterality Date     COLONOSCOPY  1/20/2012    Procedure:COLONOSCOPY; COLONOSCOPY; Surgeon:ELSY GARCIA; Location:[facility]     CORONARY ANGIOGRAPHY ADULT ORDER       HEART CATH, ANGIOPLASTY  02/22/2016    JAYME x2 to RCA     LEG SURGERY  2005     robotic surgery for prostate cancer  2013    Dr. Dudley     TONSILLECTOMY  7       FAMILY HISTORY:  Family History   Problem Relation Age of Onset     Cancer Mother        SOCIAL HISTORY:  Social History     Socioeconomic History     Marital status:      Spouse name: None     Number of children: None     Years of  "education: None     Highest education level: None   Social Needs     Financial resource strain: None     Food insecurity - worry: None     Food insecurity - inability: None     Transportation needs - medical: None     Transportation needs - non-medical: None   Occupational History     Occupation: [employer]   Tobacco Use     Smoking status: Never Smoker     Smokeless tobacco: Never Used   Substance and Sexual Activity     Alcohol use: Yes     Alcohol/week: 0.6 - 1.2 oz     Types: 1 - 2 Glasses of wine per week     Comment: 3-4 glasses wine per week     Drug use: No     Sexual activity: Yes     Partners: Female   Other Topics Concern     Parent/sibling w/ CABG, MI or angioplasty before 65F 55M? Not Asked      Service Not Asked     Blood Transfusions Not Asked     Caffeine Concern No     Comment: no caffeine     Occupational Exposure Not Asked     Hobby Hazards Not Asked     Sleep Concern No     Stress Concern No     Weight Concern No     Special Diet Yes     Comment: cut out pop and sodium     Back Care Not Asked     Exercise Yes     Comment: walking a lot, cardio rehab     Bike Helmet Not Asked     Seat Belt Not Asked     Self-Exams Not Asked   Social History Narrative    so I said we could give him an Accu-Chek Simi from the clinic (I may drop it off with you on 3/2/16).             Review of Systems:  Skin:  Negative       Eyes:  Positive for glasses    ENT:  Negative      Respiratory:  Negative       Cardiovascular:    lightheadedness;Positive for occ in the AM when gets up  Gastroenterology: Negative      Genitourinary:  Negative      Musculoskeletal:  Positive for arthritis;joint pain    Neurologic:  Negative      Psychiatric:  Negative      Heme/Lymph/Imm:  Negative      Endocrine:  Positive for diabetes;hot flashes      Physical Exam:  Vitals: /64   Pulse 68   Ht 1.803 m (5' 11\")   Wt 100.2 kg (220 lb 12.8 oz)   BMI 30.80 kg/m      Constitutional:  cooperative, alert and oriented, well " developed, well nourished, in no acute distress overweight      Skin:  warm and dry to the touch, no apparent skin lesions or masses noted          Head:  normocephalic, no masses or lesions        Eyes:  pupils equal and round, conjunctivae and lids unremarkable, sclera white, no xanthalasma, EOMS intact, no nystagmus        Lymph:      ENT:  no pallor or cyanosis, dentition good        Neck:           Respiratory:  normal breath sounds, clear to auscultation, normal A-P diameter, normal symmetry, normal respiratory excursion, no use of accessory muscles         Cardiac: regular rhythm;normal S1 and S2;apical impulse not displaced   S4 no presence of murmur          pulses full and equal, no bruits auscultated                                        GI:  abdomen soft, non-tender, BS normoactive, no mass, no HSM, no bruits        Extremities and Muscular Skeletal:  no deformities, clubbing, cyanosis, erythema observed;no edema         right radial site clean without hum or bruit    Neurological:  no gross motor deficits;affect appropriate        Psych:  Alert and Oriented x 3        CC  No referring provider defined for this encounter.

## 2019-02-17 ENCOUNTER — TELEPHONE (OUTPATIENT)
Dept: FAMILY MEDICINE | Facility: CLINIC | Age: 79
End: 2019-02-17

## 2019-02-17 NOTE — TELEPHONE ENCOUNTER
Please contact Fr. Tristan (patient) and set up a telephone visit as I need to talk to him about his uncontrolled diabetes

## 2019-02-18 NOTE — TELEPHONE ENCOUNTER
Called Pt to schedule. Pt stated he already had this telephone   Visit last week with Dr. Cedeno and did not want to schedule

## 2019-03-08 DIAGNOSIS — E11.69 TYPE 2 DIABETES MELLITUS WITH OTHER SPECIFIED COMPLICATION, WITHOUT LONG-TERM CURRENT USE OF INSULIN (H): ICD-10-CM

## 2019-03-08 RX ORDER — GLIMEPIRIDE 4 MG/1
8 TABLET ORAL
Qty: 90 TABLET | Refills: 1 | Status: SHIPPED | OUTPATIENT
Start: 2019-03-08 | End: 2019-06-05

## 2019-03-08 NOTE — TELEPHONE ENCOUNTER
LOV 2/6/19 Cedeno; patient was supposed to have telephone visit to discuss lab results - unclear if that took place.  Patient seeking refill of Glimipiride, does any dose change need to take place?  Patient was advise follow up 6 months (August 2019) for fasting physical.          Last Written Prescription Date:  1/21/19 (per pharmacy filled 2/9/19)  Last Fill Quantity: 60,   # refills: 0  Last Office Visit: 2-6-19 Cedeno  Future Office visit:       Routing refill request to provider for review/approval because:  Dose change needed?    Mabel Howell RT (R)

## 2019-03-19 DIAGNOSIS — E78.5 HYPERLIPIDEMIA LDL GOAL <100: ICD-10-CM

## 2019-03-19 NOTE — TELEPHONE ENCOUNTER
"  atorvastatin (LIPITOR) 80 MG tablet 90 tablet 1 6/12/2018         Last Written Prescription Date:  06/12/2018  Last Fill Quantity: 90,  # refills: 1   Last office visit: 2/6/2019 with prescribing provider:     Future Office Visit:  Unknown    Requested Prescriptions   Pending Prescriptions Disp Refills     atorvastatin (LIPITOR) 80 MG tablet 90 tablet 1     Sig: Take 1 tablet (80 mg) by mouth daily    Statins Protocol Passed - 3/19/2019  2:57 PM       Passed - LDL on file in past 12 months    Recent Labs   Lab Test 02/06/19  1046   LDL 67            Passed - No abnormal creatine kinase in past 12 months    No lab results found.            Passed - Recent (12 mo) or future (30 days) visit within the authorizing provider's specialty    Patient had office visit in the last 12 months or has a visit in the next 30 days with authorizing provider or within the authorizing provider's specialty.  See \"Patient Info\" tab in inbasket, or \"Choose Columns\" in Meds & Orders section of the refill encounter.             Passed - Medication is active on med list       Passed - Patient is age 18 or older          "

## 2019-03-20 RX ORDER — ATORVASTATIN CALCIUM 80 MG/1
80 TABLET, FILM COATED ORAL DAILY
Qty: 90 TABLET | Refills: 1 | Status: SHIPPED | OUTPATIENT
Start: 2019-03-20 | End: 2019-09-25

## 2019-05-13 DIAGNOSIS — I21.4 NSTEMI (NON-ST ELEVATED MYOCARDIAL INFARCTION) (H): ICD-10-CM

## 2019-05-13 RX ORDER — CARVEDILOL 12.5 MG/1
12.5 TABLET ORAL 2 TIMES DAILY
Qty: 180 TABLET | Refills: 2 | Status: SHIPPED | OUTPATIENT
Start: 2019-05-13 | End: 2019-11-06

## 2019-05-29 DIAGNOSIS — I10 ESSENTIAL HYPERTENSION: ICD-10-CM

## 2019-05-29 RX ORDER — LISINOPRIL AND HYDROCHLOROTHIAZIDE 12.5; 2 MG/1; MG/1
1 TABLET ORAL DAILY
Qty: 90 TABLET | Refills: 3 | Status: SHIPPED | OUTPATIENT
Start: 2019-05-29 | End: 2020-10-22

## 2019-06-05 DIAGNOSIS — E11.69 TYPE 2 DIABETES MELLITUS WITH OTHER SPECIFIED COMPLICATION, WITHOUT LONG-TERM CURRENT USE OF INSULIN (H): ICD-10-CM

## 2019-06-05 NOTE — TELEPHONE ENCOUNTER
Last Rx from 3/8/19 #90 - that is only a 45 day supply.     Updated quantity to #180 for full 90 days.    Due for fasting physical in August.  SIG & pharm note given.    RT Tavo (R)

## 2019-06-05 NOTE — TELEPHONE ENCOUNTER
"glimepiride (AMARYL) 4 MG tablet 90 tablet 1 3/8/2019     Last Written Prescription Date:  3/8/19  Last Fill Quantity: 90,  # refills: 1   Last office visit: 2/6/2019 with prescribing provider:  Pao   Future Office Visit:  None    Requested Prescriptions   Pending Prescriptions Disp Refills     glimepiride (AMARYL) 4 MG tablet 180 tablet 0     Sig: Take 2 tablets (8 mg) by mouth every morning (before breakfast) - Fasting physical due in August 2019 for additional refills       Sulfonylurea Agents Failed - 6/5/2019  1:41 PM        Failed - Patient has documented A1c within the specified period of time.     If HgbA1C is 8 or greater, it needs to be on file within the past 3 months.  If less than 8, must be on file within the past 6 months.     Recent Labs   Lab Test 02/06/19  1046   A1C 9.6*             Failed - Recent (6 mo) or future (30 days) visit within the authorizing provider's specialty     Patient had office visit in the last 6 months or has a visit in the next 30 days with authorizing provider or within the authorizing provider's specialty.  See \"Patient Info\" tab in inbasket, or \"Choose Columns\" in Meds & Orders section of the refill encounter.            Passed - Blood pressure less than 140/90 in past 6 months     BP Readings from Last 3 Encounters:   02/11/19 126/64   02/06/19 136/75   06/05/18 139/71                 Passed - Patient has documented LDL within the past 12 mos.     Recent Labs   Lab Test 02/06/19  1046   LDL 67             Passed - Patient has had a Microalbumin in the past 15 mos.     Recent Labs   Lab Test 02/06/19  1054   MICROL 26   UMALCR 16.88             Passed - Medication is active on med list        Passed - Patient is age 18 or older        Passed - Patient has a recent creatinine (normal) within the past 12 mos.     Recent Labs   Lab Test 02/06/19  1046  12/05/14  0850   CR 0.83   < >  --    CREAT  --   --  1.0    < > = values in this interval not displayed.             [facility] " Follow-up to PHQ 3/14/2017 6/5/2018 6/2/2019   PHQ-9 9. Suicide Ideation past 2 weeks Not at all Not at all Not at all

## 2019-06-06 RX ORDER — GLIMEPIRIDE 4 MG/1
8 TABLET ORAL
Qty: 180 TABLET | Refills: 0 | Status: SHIPPED | OUTPATIENT
Start: 2019-06-06 | End: 2019-09-03

## 2019-06-21 DIAGNOSIS — E11.69 TYPE 2 DIABETES MELLITUS WITH OTHER SPECIFIED COMPLICATION, WITHOUT LONG-TERM CURRENT USE OF INSULIN (H): ICD-10-CM

## 2019-06-22 NOTE — TELEPHONE ENCOUNTER
"Last Written Prescription Date:  11/15/18  Last Fill Quantity: 60 tablet,  # refills: 0   Last office visit: 2/6/2019 with prescribing provider:  Pao   Future Office Visit:      Requested Prescriptions   Pending Prescriptions Disp Refills     metFORMIN (GLUCOPHAGE) 1000 MG tablet 180 tablet 0     Sig: Take 1 tablet (1,000 mg) by mouth 2 times daily (with meals) - Fasting physical due by end of August for additional refills       Biguanide Agents Failed - 6/21/2019 10:31 AM        Failed - Patient has documented A1c within the specified period of time.     If HgbA1C is 8 or greater, it needs to be on file within the past 3 months.  If less than 8, must be on file within the past 6 months.     Recent Labs   Lab Test 02/06/19  1046   A1C 9.6*             Failed - Recent (6 mo) or future (30 days) visit within the authorizing provider's specialty     Patient had office visit in the last 6 months or has a visit in the next 30 days with authorizing provider or within the authorizing provider's specialty.  See \"Patient Info\" tab in inbasket, or \"Choose Columns\" in Meds & Orders section of the refill encounter.            Passed - Blood pressure less than 140/90 in past 6 months     BP Readings from Last 3 Encounters:   02/11/19 126/64   02/06/19 136/75   06/05/18 139/71                 Passed - Patient has documented LDL within the past 12 mos.     Recent Labs   Lab Test 02/06/19  1046   LDL 67             Passed - Patient has had a Microalbumin in the past 15 mos.     Recent Labs   Lab Test 02/06/19  1054   MICROL 26   UMALCR 16.88             Passed - Patient is age 10 or older        Passed - Patient's CR is NOT>1.4 OR Patient's EGFR is NOT<45 within past 12 mos.     Recent Labs   Lab Test 02/06/19  1046   GFRESTIMATED 85   GFRESTBLACK >90       Recent Labs   Lab Test 02/06/19  1046   CR 0.83             Passed - Patient does NOT have a diagnosis of CHF.        Passed - Medication is active on med list          "

## 2019-08-26 ENCOUNTER — TRANSFERRED RECORDS (OUTPATIENT)
Dept: HEALTH INFORMATION MANAGEMENT | Facility: CLINIC | Age: 79
End: 2019-08-26

## 2019-08-26 LAB — RETINOPATHY: NEGATIVE

## 2019-09-03 DIAGNOSIS — E11.69 TYPE 2 DIABETES MELLITUS WITH OTHER SPECIFIED COMPLICATION, WITHOUT LONG-TERM CURRENT USE OF INSULIN (H): ICD-10-CM

## 2019-09-03 NOTE — LETTER
[place] [address] [address] [facility] [address]  Phone: [phone]  September 4, 2019      Jaguar Tristan  [address] [facility] [address]      Dear Jaguar,    We care about your health and have reviewed your health plan including your medical conditions, medications, and lab results.  Based on this review, it is recommended that you follow up regarding the following health topic(s):  -Wellness (Physical) Visit     We recommend you take the following action(s):  -schedule a WELLNESS (Physical) APPOINTMENT.  We will perform the following labs: Lipids (fasting cholesterol - nothing to eat except water and/or meds for 8-10 hours), CMP(complete metabolic panel) and CBC (complete blood cell counts).     Please call us at [phone] (or use [facility]) to address the above recommendations.     Thank you for trusting [facility] and we appreciate the opportunity to serve you.  We look forward to supporting your healthcare needs in the future.    Healthy Regards,    Your Health Care Team  [facility]

## 2019-09-04 RX ORDER — GLIMEPIRIDE 4 MG/1
8 TABLET ORAL
Qty: 60 TABLET | Refills: 0 | Status: SHIPPED | OUTPATIENT
Start: 2019-09-04 | End: 2019-09-30

## 2019-09-04 NOTE — TELEPHONE ENCOUNTER
Medication is being filled for 1 time refill only due to:  Patient needs to be seen because due for fasting physical..     Sent letter reminder.    Hallie Hannah RN

## 2019-09-04 NOTE — TELEPHONE ENCOUNTER
"Glimepiride 4 mg    Last Written Prescription Date:  06/06/19  Last Fill Quantity: 180 tablets,  # refills: 0   Last office visit: 2/6/2019 with prescribing provider:  Pao   Future Office Visit:      Requested Prescriptions   Pending Prescriptions Disp Refills     glimepiride (AMARYL) 4 MG tablet 60 tablet 0     Sig: Take 2 tablets (8 mg) by mouth every morning (before breakfast) - Overdue for fasting physical, call to scheduled ASAP       Sulfonylurea Agents Failed - 9/4/2019  1:38 PM        Failed - Blood pressure less than 140/90 in past 6 months     BP Readings from Last 3 Encounters:   02/11/19 126/64   02/06/19 136/75   06/05/18 139/71                 Failed - Patient has documented A1c within the specified period of time.     If HgbA1C is 8 or greater, it needs to be on file within the past 3 months.  If less than 8, must be on file within the past 6 months.     Recent Labs   Lab Test 02/06/19  1046   A1C 9.6*             Failed - Recent (6 mo) or future (30 days) visit within the authorizing provider's specialty     Patient had office visit in the last 6 months or has a visit in the next 30 days with authorizing provider or within the authorizing provider's specialty.  See \"Patient Info\" tab in inbasket, or \"Choose Columns\" in Meds & Orders section of the refill encounter.            Passed - Patient has documented LDL within the past 12 mos.     Recent Labs   Lab Test 02/06/19  1046   LDL 67             Passed - Patient has had a Microalbumin in the past 15 mos.     Recent Labs   Lab Test 02/06/19  1054   MICROL 26   UMALCR 16.88             Passed - Medication is active on med list        Passed - Patient is age 18 or older        Passed - Patient has a recent creatinine (normal) within the past 12 mos.     Recent Labs   Lab Test 02/06/19  1046  12/05/14  0850   CR 0.83   < >  --    CREAT  --   --  1.0    < > = values in this interval not displayed.               "

## 2019-09-16 DIAGNOSIS — E11.69 TYPE 2 DIABETES MELLITUS WITH OTHER SPECIFIED COMPLICATION, WITHOUT LONG-TERM CURRENT USE OF INSULIN (H): ICD-10-CM

## 2019-09-17 NOTE — TELEPHONE ENCOUNTER
"Last Written Prescription Date:  6/24/19  Last Fill Quantity: 180 tablet,  # refills: 0   Last office visit: 2/6/2019 with prescribing provider:  Pao   Future Office Visit:      Requested Prescriptions   Pending Prescriptions Disp Refills     metFORMIN (GLUCOPHAGE) 1000 MG tablet 60 tablet 0     Sig: Take 1 tablet (1,000 mg) by mouth 2 times daily (with meals) - Overdue for fasting physical, call now to schedule [phone]       Biguanide Agents Failed - 9/16/2019  1:14 PM        Failed - Blood pressure less than 140/90 in past 6 months     BP Readings from Last 3 Encounters:   02/11/19 126/64   02/06/19 136/75   06/05/18 139/71                 Failed - Patient has documented A1c within the specified period of time.     If HgbA1C is 8 or greater, it needs to be on file within the past 3 months.  If less than 8, must be on file within the past 6 months.     Recent Labs   Lab Test 02/06/19  1046   A1C 9.6*             Failed - Recent (6 mo) or future (30 days) visit within the authorizing provider's specialty     Patient had office visit in the last 6 months or has a visit in the next 30 days with authorizing provider or within the authorizing provider's specialty.  See \"Patient Info\" tab in inbasket, or \"Choose Columns\" in Meds & Orders section of the refill encounter.            Passed - Patient has documented LDL within the past 12 mos.     Recent Labs   Lab Test 02/06/19  1046   LDL 67             Passed - Patient has had a Microalbumin in the past 15 mos.     Recent Labs   Lab Test 02/06/19  1054   MICROL 26   UMALCR 16.88             Passed - Patient is age 10 or older        Passed - Patient's CR is NOT>1.4 OR Patient's EGFR is NOT<45 within past 12 mos.     Recent Labs   Lab Test 02/06/19  1046   GFRESTIMATED 85   GFRESTBLACK >90       Recent Labs   Lab Test 02/06/19  1046   CR 0.83             Passed - Patient does NOT have a diagnosis of CHF.        Passed - Medication is active on med list          "

## 2019-09-18 NOTE — TELEPHONE ENCOUNTER
Medication is being filled for 1 time refill only due to:  Patient needs to be seen because due for physical/6 month f/u .   Lissett CALVILLO RN

## 2019-09-25 DIAGNOSIS — E78.5 HYPERLIPIDEMIA LDL GOAL <100: ICD-10-CM

## 2019-09-25 RX ORDER — ATORVASTATIN CALCIUM 80 MG/1
80 TABLET, FILM COATED ORAL DAILY
Qty: 90 TABLET | Refills: 0 | Status: SHIPPED | OUTPATIENT
Start: 2019-09-25 | End: 2019-12-20

## 2019-09-25 NOTE — TELEPHONE ENCOUNTER
Requested Prescriptions   Pending Prescriptions Disp Refills     atorvastatin (LIPITOR) 80 MG tablet 90 tablet 1     Sig: Take 1 tablet (80 mg) by mouth daily       There is no refill protocol information for this order        Last Written Prescription Date:  3/20/19  Last Fill Quantity: 90,  # refills: 1   Last office visit: 2/6/2019 with prescribing provider:  Stephan Cedeno     Future Office Visit:

## 2019-09-25 NOTE — TELEPHONE ENCOUNTER
"Prescription approved per [facility].  Lissett CALVILLO RN    Last Written Prescription Date:  3/20/2019  Last Fill Quantity: 90,  # refills: 1   Last office visit: 2/6/2019 with prescribing provider:     Future Office Visit:    Requested Prescriptions   Pending Prescriptions Disp Refills     atorvastatin (LIPITOR) 80 MG tablet 90 tablet 1     Sig: Take 1 tablet (80 mg) by mouth daily       Statins Protocol Failed - 9/25/2019 12:47 PM        Failed - Recent (12 mo) or future (30 days) visit within the authorizing provider's specialty     Patient had office visit in the last 12 months or has a visit in the next 30 days with authorizing provider or within the authorizing provider's specialty.  See \"Patient Info\" tab in inbasket, or \"Choose Columns\" in Meds & Orders section of the refill encounter.              Passed - LDL on file in past 12 months     Recent Labs   Lab Test 02/06/19  1046   LDL 67             Passed - No abnormal creatine kinase in past 12 months     No lab results found.             Passed - Medication is active on med list        Passed - Patient is age 18 or older        "

## 2019-09-28 DIAGNOSIS — E11.69 TYPE 2 DIABETES MELLITUS WITH OTHER SPECIFIED COMPLICATION, WITHOUT LONG-TERM CURRENT USE OF INSULIN (H): ICD-10-CM

## 2019-09-28 NOTE — TELEPHONE ENCOUNTER
"Last Written Prescription Date:  9/04/19  Last Fill Quantity: 60 tablet,  # refills: 0   Last office visit: 2/6/2019 with prescribing provider:  Pao   Future Office Visit:      Requested Prescriptions   Pending Prescriptions Disp Refills     glimepiride (AMARYL) 4 MG tablet 60 tablet 0     Sig: Take 2 tablets (8 mg) by mouth every morning (before breakfast) - Overdue for fasting physical, call to scheduled ASAP       Sulfonylurea Agents Failed - 9/28/2019  9:02 AM        Failed - Blood pressure less than 140/90 in past 6 months     BP Readings from Last 3 Encounters:   02/11/19 126/64   02/06/19 136/75   06/05/18 139/71                 Failed - Patient has documented A1c within the specified period of time.     If HgbA1C is 8 or greater, it needs to be on file within the past 3 months.  If less than 8, must be on file within the past 6 months.     Recent Labs   Lab Test 02/06/19  1046   A1C 9.6*             Failed - Recent (6 mo) or future (30 days) visit within the authorizing provider's specialty     Patient had office visit in the last 6 months or has a visit in the next 30 days with authorizing provider or within the authorizing provider's specialty.  See \"Patient Info\" tab in inbasket, or \"Choose Columns\" in Meds & Orders section of the refill encounter.            Passed - Patient has documented LDL within the past 12 mos.     Recent Labs   Lab Test 02/06/19  1046   LDL 67             Passed - Patient has had a Microalbumin in the past 15 mos.     Recent Labs   Lab Test 02/06/19  1054   MICROL 26   UMALCR 16.88             Passed - Medication is active on med list        Passed - Patient is age 18 or older        Passed - Patient has a recent creatinine (normal) within the past 12 mos.     Recent Labs   Lab Test 02/06/19  1046  12/05/14  0850   CR 0.83   < >  --    CREAT  --   --  1.0    < > = values in this interval not displayed.               "

## 2019-09-30 RX ORDER — GLIMEPIRIDE 4 MG/1
8 TABLET ORAL
Qty: 60 TABLET | Refills: 0 | Status: SHIPPED | OUTPATIENT
Start: 2019-09-30 | End: 2020-01-30

## 2019-11-04 DIAGNOSIS — E11.69 TYPE 2 DIABETES MELLITUS WITH OTHER SPECIFIED COMPLICATION, WITHOUT LONG-TERM CURRENT USE OF INSULIN (H): ICD-10-CM

## 2019-11-04 NOTE — TELEPHONE ENCOUNTER
TC - please call patient, overdue for OV with Dr Cedeno.  Needs appt ASAP.    Multiple antony fills given with SIG/Pharm notes, appt not schedule    LOV 2-6-19 Pao Howell, RT (R)

## 2019-11-05 NOTE — TELEPHONE ENCOUNTER
"Last Written Prescription Date:  9/30/2019  Last Fill Quantity: 60,  # refills: 0   Last office visit: 2/6/2019 with prescribing provider:  PCP   Future Office Visit:      Requested Prescriptions   Pending Prescriptions Disp Refills     glimepiride (AMARYL) 4 MG tablet 60 tablet 0     Sig: Take 2 tablets (8 mg) by mouth every morning (before breakfast) - Overdue for fasting physical, call to scheduled ASAP       Sulfonylurea Agents Failed - 11/4/2019  3:07 PM        Failed - Blood pressure less than 140/90 in past 6 months     BP Readings from Last 3 Encounters:   02/11/19 126/64   02/06/19 136/75   06/05/18 139/71                 Failed - Patient has documented A1c within the specified period of time.     If HgbA1C is 8 or greater, it needs to be on file within the past 3 months.  If less than 8, must be on file within the past 6 months.     Recent Labs   Lab Test 02/06/19  1046   A1C 9.6*             Failed - Recent (6 mo) or future (30 days) visit within the authorizing provider's specialty     Patient had office visit in the last 6 months or has a visit in the next 30 days with authorizing provider or within the authorizing provider's specialty.  See \"Patient Info\" tab in inbasket, or \"Choose Columns\" in Meds & Orders section of the refill encounter.            Passed - Patient has documented LDL within the past 12 mos.     Recent Labs   Lab Test 02/06/19  1046   LDL 67             Passed - Patient has had a Microalbumin in the past 15 mos.     Recent Labs   Lab Test 02/06/19  1054   MICROL 26   UMALCR 16.88             Passed - Medication is active on med list        Passed - Patient is age 18 or older        Passed - Patient has a recent creatinine (normal) within the past 12 mos.     Recent Labs   Lab Test 02/06/19  1046  12/05/14  0850   CR 0.83   < >  --    CREAT  --   --  1.0    < > = values in this interval not displayed.           Future refills by PCP Dr. Stephan Cedeno with phone number " [phone].

## 2019-11-06 DIAGNOSIS — I21.4 NSTEMI (NON-ST ELEVATED MYOCARDIAL INFARCTION) (H): ICD-10-CM

## 2019-11-06 RX ORDER — CARVEDILOL 12.5 MG/1
12.5 TABLET ORAL 2 TIMES DAILY
Qty: 180 TABLET | Refills: 1 | Status: SHIPPED | OUTPATIENT
Start: 2019-11-06 | End: 2020-10-22

## 2019-11-06 RX ORDER — GLIMEPIRIDE 4 MG/1
8 TABLET ORAL
Qty: 60 TABLET | Refills: 0 | OUTPATIENT
Start: 2019-11-06

## 2019-11-06 NOTE — TELEPHONE ENCOUNTER
Routing refill request to provider for review/approval because:  Soraida given x1 and patient did not follow up, please advise  Labs not current:  A1c, BP  Patient needs to be seen because:  Patient over due for diabetes OV.    JULI FarrarN, RN  [employer] [facility]

## 2019-12-18 DIAGNOSIS — E78.5 HYPERLIPIDEMIA LDL GOAL <100: ICD-10-CM

## 2019-12-19 NOTE — TELEPHONE ENCOUNTER
"atorvastatin (LIPITOR) 80 MG tablet 90 tablet 0 9/25/2019     Last Written Prescription Date:  9/25/19  Last Fill Quantity: 90,  # refills: 0   Last office visit: 2/6/2019 with prescribing provider:  Pao   Future Office Visit:  None    Requested Prescriptions   Pending Prescriptions Disp Refills     atorvastatin (LIPITOR) 80 MG tablet 90 tablet 0     Sig: Take 1 tablet (80 mg) by mouth daily       Statins Protocol Failed - 12/19/2019 12:18 PM        Failed - Recent (12 mo) or future (30 days) visit within the authorizing provider's specialty     Patient has had an office visit with the authorizing provider or a provider within the authorizing providers department within the previous 12 mos or has a future within next 30 days. See \"Patient Info\" tab in inbasket, or \"Choose Columns\" in Meds & Orders section of the refill encounter.              Passed - LDL on file in past 12 months     Recent Labs   Lab Test 02/06/19  1046   LDL 67             Passed - No abnormal creatine kinase in past 12 months     No lab results found.             Passed - Medication is active on med list        Passed - Patient is age 18 or older        [facility] Follow-up to PHQ 3/14/2017 6/5/2018 6/2/2019   PHQ-9 9. Suicide Ideation past 2 weeks Not at all Not at all Not at all         "

## 2019-12-20 NOTE — TELEPHONE ENCOUNTER
Routing refill request to provider for review/approval because:  Soraida given x1 and patient did not follow up, please advise  Patient needs to be seen because:  Patient was due for follow up diabetes OV.    JULI FarrarN, RN  [employer] [facility] [facility]

## 2019-12-23 RX ORDER — ATORVASTATIN CALCIUM 80 MG/1
80 TABLET, FILM COATED ORAL DAILY
Qty: 15 TABLET | Refills: 0 | Status: SHIPPED | OUTPATIENT
Start: 2019-12-23 | End: 2020-10-02

## 2020-01-30 RX ORDER — GLIMEPIRIDE 4 MG/1
8 TABLET ORAL
Qty: 60 TABLET | Refills: 0 | Status: SHIPPED | OUTPATIENT
Start: 2020-01-30 | End: 2020-02-25

## 2020-01-30 NOTE — TELEPHONE ENCOUNTER
Called patient and schedule 02/24. Pt stated he was leaving town for [place] and can't  Come in sooner. Pt would like a refill of this medication

## 2020-02-24 DIAGNOSIS — E11.69 TYPE 2 DIABETES MELLITUS WITH OTHER SPECIFIED COMPLICATION, WITHOUT LONG-TERM CURRENT USE OF INSULIN (H): ICD-10-CM

## 2020-02-25 RX ORDER — GLIMEPIRIDE 4 MG/1
8 TABLET ORAL
Qty: 60 TABLET | Refills: 0 | Status: SHIPPED | OUTPATIENT
Start: 2020-02-25 | End: 2020-03-03

## 2020-02-25 NOTE — TELEPHONE ENCOUNTER
glimepiride (AMARYL) 4 MG tablet    Last Written Prescription Date:  1/30/2020  Last Fill Quantity: 60,  # refills: 0   Last office visit: 2/6/2019 with prescribing provider:  Dr. Cedeno    Future Office Visit:   Next 5 appointments (look out 90 days)    Mar 03, 2020 10:30 AM CST  [facility] [address] [facility] [id number] [phone]         Requested Prescriptions   Pending Prescriptions Disp Refills     glimepiride (AMARYL) 4 MG tablet 60 tablet 0     Sig: Take 2 tablets (8 mg) by mouth every morning (before breakfast) - Overdue for fasting physical, call to scheduled ASAP       Sulfonylurea Agents Failed - 2/24/2020  5:42 PM        Failed - Blood pressure less than 140/90 in past 6 months     BP Readings from Last 3 Encounters:   02/11/19 126/64   02/06/19 136/75   06/05/18 139/71                 Failed - Patient has documented LDL within the past 12 mos.     Recent Labs   Lab Test 02/06/19  1046   LDL 67             Failed - Patient has documented A1c within the specified period of time.     If HgbA1C is 8 or greater, it needs to be on file within the past 3 months.  If less than 8, must be on file within the past 6 months.     Recent Labs   Lab Test 02/06/19  1046   A1C 9.6*             Failed - Patient has a recent creatinine (normal) within the past 12 mos.     Recent Labs   Lab Test 02/06/19  1046  12/05/14  0850   CR 0.83   < >  --    CREAT  --   --  1.0    < > = values in this interval not displayed.             Passed - Patient has had a Microalbumin in the past 15 mos.     Recent Labs   Lab Test 02/06/19  1054   MICROL 26   UMALCR 16.88             Passed - Medication is active on med list        Passed - Patient is age 18 or older        Passed - Recent (6 mo) or future (30 days) visit within the authorizing provider's specialty     Patient had office visit in the last 6 months or has a visit in the  "next 30 days with authorizing provider or within the authorizing provider's specialty.  See \"Patient Info\" tab in inbasket, or \"Choose Columns\" in Meds & Orders section of the refill encounter.              "

## 2020-02-25 NOTE — TELEPHONE ENCOUNTER
Medication is being filled for 1 time refill only due to:  Patient needs to be seen because it has been more than one year since last visit.   Daria Ahuja RN

## 2020-02-28 ENCOUNTER — TRANSFERRED RECORDS (OUTPATIENT)
Dept: MULTI SPECIALTY CLINIC | Facility: CLINIC | Age: 80
End: 2020-02-28

## 2020-02-28 LAB — RETINOPATHY: NEGATIVE

## 2020-03-03 ENCOUNTER — OFFICE VISIT (OUTPATIENT)
Dept: FAMILY MEDICINE | Facility: CLINIC | Age: 80
End: 2020-03-03
Payer: MEDICARE

## 2020-03-03 VITALS
SYSTOLIC BLOOD PRESSURE: 138 MMHG | BODY MASS INDEX: 31.25 KG/M2 | DIASTOLIC BLOOD PRESSURE: 76 MMHG | OXYGEN SATURATION: 96 % | HEART RATE: 78 BPM | TEMPERATURE: 99.6 F | WEIGHT: 211 LBS | HEIGHT: 69 IN

## 2020-03-03 DIAGNOSIS — Z12.11 COLON CANCER SCREENING: ICD-10-CM

## 2020-03-03 DIAGNOSIS — Z00.00 MEDICARE ANNUAL WELLNESS VISIT, SUBSEQUENT: Primary | ICD-10-CM

## 2020-03-03 DIAGNOSIS — E11.69 TYPE 2 DIABETES MELLITUS WITH OTHER SPECIFIED COMPLICATION, WITHOUT LONG-TERM CURRENT USE OF INSULIN (H): ICD-10-CM

## 2020-03-03 DIAGNOSIS — C61 PROSTATE CANCER (H): ICD-10-CM

## 2020-03-03 DIAGNOSIS — I10 BENIGN ESSENTIAL HYPERTENSION: ICD-10-CM

## 2020-03-03 DIAGNOSIS — E78.5 HYPERLIPIDEMIA LDL GOAL <100: ICD-10-CM

## 2020-03-03 LAB
BASOPHILS # BLD AUTO: 0.1 10E9/L (ref 0–0.2)
BASOPHILS NFR BLD AUTO: 0.7 %
DIFFERENTIAL METHOD BLD: NORMAL
EOSINOPHIL # BLD AUTO: 0.1 10E9/L (ref 0–0.7)
EOSINOPHIL NFR BLD AUTO: 1 %
ERYTHROCYTE [DISTWIDTH] IN BLOOD BY AUTOMATED COUNT: 13.4 % (ref 10–15)
HBA1C MFR BLD: 11.6 % (ref 0–5.6)
HCT VFR BLD AUTO: 41.3 % (ref 40–53)
HGB BLD-MCNC: 13.8 G/DL (ref 13.3–17.7)
LYMPHOCYTES # BLD AUTO: 0.9 10E9/L (ref 0.8–5.3)
LYMPHOCYTES NFR BLD AUTO: 11.2 %
MCH RBC QN AUTO: 28.9 PG (ref 26.5–33)
MCHC RBC AUTO-ENTMCNC: 33.4 G/DL (ref 31.5–36.5)
MCV RBC AUTO: 87 FL (ref 78–100)
MONOCYTES # BLD AUTO: 0.7 10E9/L (ref 0–1.3)
MONOCYTES NFR BLD AUTO: 9.1 %
NEUTROPHILS # BLD AUTO: 6 10E9/L (ref 1.6–8.3)
NEUTROPHILS NFR BLD AUTO: 78 %
PLATELET # BLD AUTO: 189 10E9/L (ref 150–450)
RBC # BLD AUTO: 4.77 10E12/L (ref 4.4–5.9)
WBC # BLD AUTO: 7.7 10E9/L (ref 4–11)

## 2020-03-03 PROCEDURE — 82043 UR ALBUMIN QUANTITATIVE: CPT | Performed by: INTERNAL MEDICINE

## 2020-03-03 PROCEDURE — G0439 PPPS, SUBSEQ VISIT: HCPCS | Performed by: INTERNAL MEDICINE

## 2020-03-03 PROCEDURE — 80061 LIPID PANEL: CPT | Performed by: INTERNAL MEDICINE

## 2020-03-03 PROCEDURE — 80050 GENERAL HEALTH PANEL: CPT | Performed by: INTERNAL MEDICINE

## 2020-03-03 PROCEDURE — 36415 COLL VENOUS BLD VENIPUNCTURE: CPT | Performed by: INTERNAL MEDICINE

## 2020-03-03 PROCEDURE — 99214 OFFICE O/P EST MOD 30 MIN: CPT | Mod: 25 | Performed by: INTERNAL MEDICINE

## 2020-03-03 PROCEDURE — 83036 HEMOGLOBIN GLYCOSYLATED A1C: CPT | Performed by: INTERNAL MEDICINE

## 2020-03-03 RX ORDER — GLIMEPIRIDE 4 MG/1
8 TABLET ORAL
Qty: 180 TABLET | Refills: 1 | Status: SHIPPED | OUTPATIENT
Start: 2020-03-03 | End: 2020-09-10

## 2020-03-03 ASSESSMENT — MIFFLIN-ST. JEOR: SCORE: 1677.08

## 2020-03-03 ASSESSMENT — ACTIVITIES OF DAILY LIVING (ADL): CURRENT_FUNCTION: NO ASSISTANCE NEEDED

## 2020-03-03 NOTE — PATIENT INSTRUCTIONS
Monitor your blood sugar twice a day (before breakfast and before dinner) and record in a small notebook (always bring this notebook with you during you clinic visits).  Call doctor if your blood sugar readings are not consistently between 100-140, or if they are greater than 200 or less than 80.    Monitor your blood pressure once a week.  Call doctor if:  -- your blood pressure for the top/upper number is greater than 140 or less than 90  -- your blood pressure for the bottom/lower number is greater than 90 or less than 60    Maintain low fat/calorie diet and regular exercise.    Don't forget to return your stool sample.    Schedule a laboratory-only visit in 6 months (September) for your blood tests (please come in fasting).    Follow up yearly or as needed.

## 2020-03-03 NOTE — PROGRESS NOTES
"SUBJECTIVE:   Jaguar Tristan is a 79 year old male who presents for Preventive Visit.    Patient's diabetes is well uncontrolled.  He is following up closely with urology s/p treatment for prostate cancer.  Hypertension is controlled.  Continues to take high-dose atorvastatin for hyperlipidemia.  Tolerating the medication well.    Are you in the first 12 months of your Medicare coverage?  No    Healthy Habits:     In general, how would you rate your overall health?  Very good    Frequency of exercise:  None    Duration of exercise:  [place] )    Do you usually eat at least 4 servings of fruit and vegetables a day, include whole grains    & fiber and avoid regularly eating high fat or \"junk\" foods?  Yes    Taking medications regularly:  Yes    Barriers to taking medications:  Problems remembering to take them (evening meds)    Medication side effects:  None    Ability to successfully perform activities of daily living:  No assistance needed    Home Safety:  Lack of grab bars in the bathroom and throw rugs in the hallway    Hearing Impairment:  No hearing concerns    In the past 6 months, have you been bothered by leaking of urine?  No    In general, how would you rate your overall mental or emotional health?  Excellent      PHQ-2 Total Score: 0    Additional concerns today:  No    Do you feel safe in your environment? Yes    Have you ever done Advance Care Planning? (For example, a Health Directive, POLST, or a discussion with a medical provider or your loved ones about your wishes): No, advance care planning information given to patient to review.  Patient declined advance care planning discussion at this time.      Fall risk  Fallen 2 or more times in the past year?: No  Any fall with injury in the past year?: No    Cognitive Screening   1) Repeat 3 items (Leader, Season, Table)    2) Clock draw: NORMAL  3) 3 item recall: Recalls 3 objects  Results: 3 items recalled: COGNITIVE " IMPAIRMENT LESS LIKELY    Mini-CogTM Copyright BEN [employer]. Licensed by the author for use in [facility]; reprinted with permission (isi@.[facility]). All rights reserved.      Do you have sleep apnea, excessive snoring or daytime drowsiness?: no    Reviewed and updated as needed this visit by clinical staff  Tobacco  Allergies  Meds         Reviewed and updated as needed this visit by Provider        Social History     Tobacco Use     Smoking status: Never Smoker     Smokeless tobacco: Never Used   Substance Use Topics     Alcohol use: Yes     Alcohol/week: 1.0 - 2.0 standard drinks     Types: 1 - 2 Glasses of wine per week     Comment: 3-4 glasses wine per week     If you drink alcohol do you typically have >3 drinks per day or >7 drinks per week? No    Alcohol Use 3/3/2020   Prescreen: >3 drinks/day or >7 drinks/week? No       Current providers sharing in care for this patient include:   Patient Care Team:  Stephan Cedeno MD as PCP - General (Internal Medicine)  Stephan Cedeno MD as Assigned PCP    The following health maintenance items are reviewed in Epic and correct as of today:  Health Maintenance   Topic Date Due     ZOSTER IMMUNIZATION (2 of 3) 04/28/2016     DIABETIC FOOT EXAM  06/05/2019     A1C  09/03/2020     EYE EXAM  02/28/2021     MEDICARE ANNUAL WELLNESS VISIT  03/03/2021     BMP  03/03/2021     LIPID  03/03/2021     MICROALBUMIN  03/03/2021     FALL RISK ASSESSMENT  03/03/2021     DTAP/TDAP/TD IMMUNIZATION (2 - Td) 01/31/2023     ADVANCE CARE PLANNING  03/12/2025     PHQ-2  Completed     INFLUENZA VACCINE  Completed     PNEUMOCOCCAL IMMUNIZATION 65+ LOW/MEDIUM RISK  Completed     IPV IMMUNIZATION  Aged Out     MENINGITIS IMMUNIZATION  Aged Out       Review of Systems   Constitutional: Negative for chills, fatigue and fever.   HENT: Negative for congestion, ear pain, hearing loss, sore throat and trouble swallowing.    Eyes: Negative for pain and  "visual disturbance.   Respiratory: Negative for cough and shortness of breath.    Cardiovascular: Negative for chest pain and palpitations.   Gastrointestinal: Negative for abdominal pain, blood in stool, constipation, diarrhea, nausea and vomiting.   Genitourinary: Negative for difficulty urinating and testicular pain.   Musculoskeletal: Negative for arthralgias, back pain, myalgias and neck pain.   Skin: Negative for rash.   Neurological: Negative for dizziness, light-headedness, numbness and headaches.   Psychiatric/Behavioral: Negative for dysphoric mood and sleep disturbance. The patient is not nervous/anxious.        OBJECTIVE:   /76 (BP Location: Right arm, Patient Position: Sitting, Cuff Size: Adult Large)   Pulse 78   Temp 99.6  F (37.6  C) (Tympanic)   Ht 1.76 m (5' 9.29\")   Wt 95.7 kg (211 lb)   SpO2 96%   BMI 30.90 kg/m   Estimated body mass index is 30.9 kg/m  as calculated from the following:    Height as of this encounter: 1.76 m (5' 9.29\").    Weight as of this encounter: 95.7 kg (211 lb).  Physical Exam  Vitals signs and nursing note reviewed.   Constitutional:       General: He is not in acute distress.  HENT:      Right Ear: Tympanic membrane, ear canal and external ear normal.      Left Ear: Tympanic membrane, ear canal and external ear normal.      Mouth/Throat:      Pharynx: Oropharynx is clear.   Eyes:      Conjunctiva/sclera: Conjunctivae normal.      Pupils: Pupils are equal, round, and reactive to light.   Neck:      Thyroid: No thyromegaly.   Cardiovascular:      Rate and Rhythm: Normal rate and regular rhythm.      Heart sounds: Normal heart sounds.   Pulmonary:      Effort: Pulmonary effort is normal. No respiratory distress.      Breath sounds: Normal breath sounds.   Abdominal:      Palpations: Abdomen is soft.      Tenderness: There is no abdominal tenderness.   Genitourinary:     Penis: No discharge.    Musculoskeletal: Normal range of motion.         General: No " tenderness.   Lymphadenopathy:      Cervical: No cervical adenopathy.   Skin:     Findings: No rash.   Neurological:      Mental Status: He is alert and oriented to person, place, and time.      Coordination: Coordination normal.      Comments: No tremors   Psychiatric:         Mood and Affect: Mood normal.         Behavior: Behavior normal.         ASSESSMENT / PLAN:       ICD-10-CM    1. Medicare annual wellness visit, subsequent  Z00.00 CBC with platelets differential     Comprehensive metabolic panel   2. Type 2 diabetes mellitus with other specified complication, without long-term current use of insulin (H)  E11.69 glimepiride (AMARYL) 4 MG tablet     metFORMIN (GLUCOPHAGE) 1000 MG tablet     TSH with free T4 reflex     Albumin Random Urine Quantitative with Creat Ratio     Lipid panel reflex to direct LDL Fasting     Hemoglobin A1c   3. Prostate cancer (H)  C61    4. Benign essential hypertension  I10    5. Hyperlipidemia LDL goal <100  E78.5 Lipid panel reflex to direct LDL Fasting   6. Colon cancer screening  Z12.11 Fecal colorectal cancer screen FIT       Patient Instructions   Monitor your blood sugar twice a day (before breakfast and before dinner) and record in a small notebook (always bring this notebook with you during you clinic visits).  Call doctor if your blood sugar readings are not consistently between 100-140, or if they are greater than 200 or less than 80.    Monitor your blood pressure once a week.  Call doctor if:  -- your blood pressure for the top/upper number is greater than 140 or less than 90  -- your blood pressure for the bottom/lower number is greater than 90 or less than 60    Maintain low fat/calorie diet and regular exercise.    Don't forget to return your stool sample.    Schedule a laboratory-only visit in 6 months (September) for your blood tests (please come in fasting).    Follow up yearly or as needed.      COUNSELING:  Reviewed preventive health counseling, as reflected in  "patient instructions    Estimated body mass index is 30.9 kg/m  as calculated from the following:    Height as of this encounter: 1.76 m (5' 9.29\").    Weight as of this encounter: 95.7 kg (211 lb).    Weight management plan: Discussed healthy diet and exercise guidelines     reports that he has never smoked. He has never used smokeless tobacco.      Appropriate preventive services were discussed with this patient, including applicable screening as appropriate for cardiovascular disease, diabetes, osteopenia/osteoporosis, and glaucoma.  As appropriate for age/gender, discussed screening for colorectal cancer, prostate cancer, breast cancer, and cervical cancer. Checklist reviewing preventive services available has been given to the patient.    Reviewed patients plan of care and provided an AVS. The Basic Care Plan (routine screening as documented in Health Maintenance) for Jaguar meets the Care Plan requirement. This Care Plan has been established and reviewed with the Patient.    Counseling Resources:  ATP IV Guidelines  Pooled Cohorts Equation Calculator  Breast Cancer Risk Calculator  FRAX Risk Assessment  ICSI Preventive Guidelines  Dietary Guidelines for Americans, 2010  USDA's MyPlate  ASA Prophylaxis  Lung CA Screening    Stephan Cedeno MD  [facility]    Identified Health Risks:  "

## 2020-03-04 LAB
ALBUMIN SERPL-MCNC: 3.4 G/DL (ref 3.4–5)
ALP SERPL-CCNC: 145 U/L (ref 40–150)
ALT SERPL W P-5'-P-CCNC: 19 U/L (ref 0–70)
ANION GAP SERPL CALCULATED.3IONS-SCNC: 6 MMOL/L (ref 3–14)
AST SERPL W P-5'-P-CCNC: 8 U/L (ref 0–45)
BILIRUB SERPL-MCNC: 0.6 MG/DL (ref 0.2–1.3)
BUN SERPL-MCNC: 18 MG/DL (ref 7–30)
CALCIUM SERPL-MCNC: 8.9 MG/DL (ref 8.5–10.1)
CHLORIDE SERPL-SCNC: 101 MMOL/L (ref 94–109)
CHOLEST SERPL-MCNC: 157 MG/DL
CO2 SERPL-SCNC: 25 MMOL/L (ref 20–32)
CREAT SERPL-MCNC: 0.7 MG/DL (ref 0.66–1.25)
CREAT UR-MCNC: 91 MG/DL
GFR SERPL CREATININE-BSD FRML MDRD: >90 ML/MIN/{1.73_M2}
GLUCOSE SERPL-MCNC: 305 MG/DL (ref 70–99)
HDLC SERPL-MCNC: 43 MG/DL
LDLC SERPL CALC-MCNC: 68 MG/DL
MICROALBUMIN UR-MCNC: 41 MG/L
MICROALBUMIN/CREAT UR: 44.93 MG/G CR (ref 0–17)
NONHDLC SERPL-MCNC: 114 MG/DL
POTASSIUM SERPL-SCNC: 4.2 MMOL/L (ref 3.4–5.3)
PROT SERPL-MCNC: 7 G/DL (ref 6.8–8.8)
SODIUM SERPL-SCNC: 132 MMOL/L (ref 133–144)
TRIGL SERPL-MCNC: 228 MG/DL
TSH SERPL DL<=0.005 MIU/L-ACNC: 1.28 MU/L (ref 0.4–4)

## 2020-03-12 ASSESSMENT — ENCOUNTER SYMPTOMS
CONSTIPATION: 0
FATIGUE: 0
PALPITATIONS: 0
DIZZINESS: 0
SLEEP DISTURBANCE: 0
NUMBNESS: 0
COUGH: 0
ARTHRALGIAS: 0
VOMITING: 0
SHORTNESS OF BREATH: 0
DIARRHEA: 0
NERVOUS/ANXIOUS: 0
BACK PAIN: 0
DIFFICULTY URINATING: 0
SORE THROAT: 0
BLOOD IN STOOL: 0
LIGHT-HEADEDNESS: 0
HEADACHES: 0
FEVER: 0
ABDOMINAL PAIN: 0
CHILLS: 0
EYE PAIN: 0
NAUSEA: 0
DYSPHORIC MOOD: 0
NECK PAIN: 0
TROUBLE SWALLOWING: 0
MYALGIAS: 0

## 2020-03-14 DIAGNOSIS — Z12.11 COLON CANCER SCREENING: ICD-10-CM

## 2020-03-14 PROCEDURE — 82274 ASSAY TEST FOR BLOOD FECAL: CPT | Performed by: INTERNAL MEDICINE

## 2020-03-16 NOTE — PROGRESS NOTES
"Jaguar Tristan is a 79 year old male who is being evaluated via a telephone visit.      The patient has been notified of following (by AMELIA Graves CMA       \"We have found that certain health care needs can be provided without the need for a physical exam.  This service lets us provide the care you need with a short phone conversation.  If a prescription is necessary we can send it directly to your pharmacy.  If lab work is needed we can place an order for that and you can then stop by our lab to have the test done at a later time.    This telephone visit will be conducted via 3 way call with the you (the patient) , the physician/provider, and a me all on the line at the same time.  This allows your physician/provider to have the phone conversation with you while I will be taking notes for your medical record.  We will have full access to your [place] medical record during this entire phone call.    Since this is like an office visit,  will bill your insurance company for this service.  Please check with your medical insurance if this type of telephone/virtual is covered . You may be responsible for the cost of this service if insurance coverage is denied.  The typical cost is $30 (10min), $59(11-20min) and $85 (21-30min)     If during the course of the call the physician/provider feels a telephone visit is not appropriate, you will not be charged for this service\"    Consent has been obtained for this service by care team member: yes.  See the scanned image in the medical record.      Provider notes:    Following up on patient's diabetes.  His recent hemoglobin A1c on 3/3/2020 was elevated at 11.6.  Patient admits to dietary indiscretion.  He is already on maximum doses of metformin and glimepiride.  Does not want to start insulin at this time.  He is agreeable to starting Trulicity.  Will check if the medication is covered.      Assessment/Plan:      ICD-10-CM    1. Type 2 diabetes mellitus with other " specified complication, without long-term current use of insulin (H)  E11.69 dulaglutide (TRULICITY) 0.75 MG/0.5ML pen       Total time spent with the patient over the phone was 6 minutes.      Dr. Stephan Cedeno

## 2020-03-17 LAB — HEMOCCULT STL QL IA: NEGATIVE

## 2020-03-20 ENCOUNTER — VIRTUAL VISIT (OUTPATIENT)
Dept: FAMILY MEDICINE | Facility: CLINIC | Age: 80
End: 2020-03-20
Payer: MEDICARE

## 2020-03-20 DIAGNOSIS — E11.69 TYPE 2 DIABETES MELLITUS WITH OTHER SPECIFIED COMPLICATION, WITHOUT LONG-TERM CURRENT USE OF INSULIN (H): Primary | ICD-10-CM

## 2020-03-20 PROCEDURE — G2012 BRIEF CHECK IN BY MD/QHP: HCPCS | Performed by: INTERNAL MEDICINE

## 2020-03-20 RX ORDER — DULAGLUTIDE 0.75 MG/.5ML
0.75 INJECTION, SOLUTION SUBCUTANEOUS
Qty: 2 ML | Refills: 3 | Status: SHIPPED | OUTPATIENT
Start: 2020-03-20 | End: 2020-07-02

## 2020-06-30 DIAGNOSIS — E11.69 TYPE 2 DIABETES MELLITUS WITH OTHER SPECIFIED COMPLICATION, WITHOUT LONG-TERM CURRENT USE OF INSULIN (H): ICD-10-CM

## 2020-07-01 NOTE — TELEPHONE ENCOUNTER
Routing refill request to provider for review/approval because:  Labs out of range:  A1c    JULI FarrarN, RN  [employer] [facility]

## 2020-07-02 RX ORDER — DULAGLUTIDE 0.75 MG/.5ML
0.75 INJECTION, SOLUTION SUBCUTANEOUS
Qty: 2 ML | Refills: 3 | Status: SHIPPED | OUTPATIENT
Start: 2020-07-02 | End: 2020-10-22

## 2020-08-20 DIAGNOSIS — E11.69 TYPE 2 DIABETES MELLITUS WITH OTHER SPECIFIED COMPLICATION, WITHOUT LONG-TERM CURRENT USE OF INSULIN (H): ICD-10-CM

## 2020-08-21 NOTE — TELEPHONE ENCOUNTER
1 month refill only; patient due for appointment. ([phone]).     Left message asking patient to call back to schedule appt with Dr. Cedeno (for diabetes follow up)     Liliana PERRY RN

## 2020-09-08 DIAGNOSIS — E11.69 TYPE 2 DIABETES MELLITUS WITH OTHER SPECIFIED COMPLICATION, WITHOUT LONG-TERM CURRENT USE OF INSULIN (H): ICD-10-CM

## 2020-09-09 NOTE — TELEPHONE ENCOUNTER
Refill request:    GLIMEPIRIDE 4 MG TABLET    Summary: Take 2 tablets (8 mg) by mouth every morning (before breakfast), Disp-180 tablet, R-1, E-Prescribe   Dose, Route, Frequency: 8 mg, Oral, EVERY MORNING BEFORE BREAKFAST  Start: 3/3/2020  Ord/Sold: 3/3/2020

## 2020-09-10 RX ORDER — GLIMEPIRIDE 4 MG/1
8 TABLET ORAL
Qty: 180 TABLET | Refills: 0 | Status: SHIPPED | OUTPATIENT
Start: 2020-09-10 | End: 2020-10-22

## 2020-09-10 NOTE — TELEPHONE ENCOUNTER
Medication is being filled for 1 time refill only due to:  Patient needs to be seen because due for diabetes follow up appointment.

## 2020-09-14 ENCOUNTER — TRANSFERRED RECORDS (OUTPATIENT)
Dept: HEALTH INFORMATION MANAGEMENT | Facility: CLINIC | Age: 80
End: 2020-09-14

## 2020-09-14 LAB — RETINOPATHY: POSITIVE

## 2020-09-15 DIAGNOSIS — E11.69 TYPE 2 DIABETES MELLITUS WITH OTHER SPECIFIED COMPLICATION, WITHOUT LONG-TERM CURRENT USE OF INSULIN (H): ICD-10-CM

## 2020-09-16 NOTE — TELEPHONE ENCOUNTER
DOD:  Due for appt   Will need to see new PCP though  Ok to refill 1 month?  Lissett CALVILLO RN

## 2020-09-16 NOTE — TELEPHONE ENCOUNTER
Refill request:    METFORMIN 1,000 MG TABLET    Summary: Take 1 tablet (1,000 mg) by mouth 2 times daily (with meals), Disp-60 tablet,R-0, E-Prescribe  1 month refill only; patient due for appointment. ([phone]).     Dose, Route, Frequency: 1,000 mg, Oral, 2 TIMES DAILY WITH MEALS  Start: 8/21/2020  Ord/Sold: 8/21/2020

## 2020-09-22 ENCOUNTER — TELEPHONE (OUTPATIENT)
Dept: FAMILY MEDICINE | Facility: CLINIC | Age: 80
End: 2020-09-22

## 2020-09-22 NOTE — TELEPHONE ENCOUNTER
Please abstract the following data from this visit with this patient into the appropriate field in Epic:    Tests that can be patient reported without a hard copy:    Eye exam with ophthalmology on this date: 09/14/2020 at [facility] Cindy Jordan, BART Carter ,CMA

## 2020-10-02 DIAGNOSIS — E78.5 HYPERLIPIDEMIA LDL GOAL <100: ICD-10-CM

## 2020-10-02 RX ORDER — ATORVASTATIN CALCIUM 80 MG/1
80 TABLET, FILM COATED ORAL DAILY
Qty: 30 TABLET | Refills: 0 | Status: SHIPPED | OUTPATIENT
Start: 2020-10-02 | End: 2020-10-22

## 2020-10-09 DIAGNOSIS — E11.69 TYPE 2 DIABETES MELLITUS WITH OTHER SPECIFIED COMPLICATION, WITHOUT LONG-TERM CURRENT USE OF INSULIN (H): ICD-10-CM

## 2020-10-09 NOTE — TELEPHONE ENCOUNTER
Routing refill request to provider for review/approval because:  Labs out of range:  A1c   Labs not current:  A1c  Patient needs to be seen because it has been more than 6 months since last office visit.    TCs: Please call pt to help schedule appt and and establish with new PCP    Thank you,  Tulio MAHARAJ RN

## 2020-10-22 ENCOUNTER — OFFICE VISIT (OUTPATIENT)
Dept: FAMILY MEDICINE | Facility: CLINIC | Age: 80
End: 2020-10-22
Payer: MEDICARE

## 2020-10-22 VITALS
SYSTOLIC BLOOD PRESSURE: 128 MMHG | DIASTOLIC BLOOD PRESSURE: 75 MMHG | OXYGEN SATURATION: 97 % | WEIGHT: 218 LBS | TEMPERATURE: 96.9 F | HEIGHT: 69 IN | HEART RATE: 80 BPM | BODY MASS INDEX: 32.29 KG/M2

## 2020-10-22 DIAGNOSIS — I10 ESSENTIAL HYPERTENSION: ICD-10-CM

## 2020-10-22 DIAGNOSIS — I21.4 NSTEMI (NON-ST ELEVATED MYOCARDIAL INFARCTION) (H): ICD-10-CM

## 2020-10-22 DIAGNOSIS — E11.69 TYPE 2 DIABETES MELLITUS WITH OTHER SPECIFIED COMPLICATION, WITHOUT LONG-TERM CURRENT USE OF INSULIN (H): ICD-10-CM

## 2020-10-22 DIAGNOSIS — E78.5 HYPERLIPIDEMIA LDL GOAL <100: ICD-10-CM

## 2020-10-22 DIAGNOSIS — Z00.00 ENCOUNTER FOR PREVENTIVE CARE: Primary | ICD-10-CM

## 2020-10-22 LAB
BASOPHILS # BLD AUTO: 0.1 10E9/L (ref 0–0.2)
BASOPHILS NFR BLD AUTO: 0.6 %
DIFFERENTIAL METHOD BLD: NORMAL
EOSINOPHIL # BLD AUTO: 0.1 10E9/L (ref 0–0.7)
EOSINOPHIL NFR BLD AUTO: 0.8 %
ERYTHROCYTE [DISTWIDTH] IN BLOOD BY AUTOMATED COUNT: 13.3 % (ref 10–15)
HBA1C MFR BLD: 5.5 % (ref 0–5.6)
HCT VFR BLD AUTO: 40.8 % (ref 40–53)
HGB BLD-MCNC: 13.9 G/DL (ref 13.3–17.7)
LYMPHOCYTES # BLD AUTO: 1 10E9/L (ref 0.8–5.3)
LYMPHOCYTES NFR BLD AUTO: 11 %
MCH RBC QN AUTO: 29.7 PG (ref 26.5–33)
MCHC RBC AUTO-ENTMCNC: 34.1 G/DL (ref 31.5–36.5)
MCV RBC AUTO: 87 FL (ref 78–100)
MONOCYTES # BLD AUTO: 0.8 10E9/L (ref 0–1.3)
MONOCYTES NFR BLD AUTO: 9.4 %
NEUTROPHILS # BLD AUTO: 6.8 10E9/L (ref 1.6–8.3)
NEUTROPHILS NFR BLD AUTO: 78.2 %
PLATELET # BLD AUTO: 201 10E9/L (ref 150–450)
RBC # BLD AUTO: 4.68 10E12/L (ref 4.4–5.9)
VIT B12 SERPL-MCNC: 251 PG/ML (ref 193–986)
WBC # BLD AUTO: 8.7 10E9/L (ref 4–11)

## 2020-10-22 PROCEDURE — 85025 COMPLETE CBC W/AUTO DIFF WBC: CPT | Performed by: INTERNAL MEDICINE

## 2020-10-22 PROCEDURE — 82607 VITAMIN B-12: CPT | Performed by: INTERNAL MEDICINE

## 2020-10-22 PROCEDURE — 83036 HEMOGLOBIN GLYCOSYLATED A1C: CPT | Performed by: INTERNAL MEDICINE

## 2020-10-22 PROCEDURE — 84443 ASSAY THYROID STIM HORMONE: CPT | Performed by: INTERNAL MEDICINE

## 2020-10-22 PROCEDURE — 99214 OFFICE O/P EST MOD 30 MIN: CPT | Performed by: INTERNAL MEDICINE

## 2020-10-22 PROCEDURE — 80053 COMPREHEN METABOLIC PANEL: CPT | Performed by: INTERNAL MEDICINE

## 2020-10-22 PROCEDURE — 36415 COLL VENOUS BLD VENIPUNCTURE: CPT | Performed by: INTERNAL MEDICINE

## 2020-10-22 RX ORDER — CARVEDILOL 12.5 MG/1
12.5 TABLET ORAL 2 TIMES DAILY
Qty: 180 TABLET | Refills: 1 | Status: SHIPPED | OUTPATIENT
Start: 2020-10-22 | End: 2021-04-12

## 2020-10-22 RX ORDER — GLIMEPIRIDE 4 MG/1
8 TABLET ORAL
Qty: 180 TABLET | Refills: 0 | Status: SHIPPED | OUTPATIENT
Start: 2020-10-22 | End: 2021-04-12

## 2020-10-22 RX ORDER — LISINOPRIL AND HYDROCHLOROTHIAZIDE 12.5; 2 MG/1; MG/1
1 TABLET ORAL DAILY
Qty: 90 TABLET | Refills: 3 | Status: SHIPPED | OUTPATIENT
Start: 2020-10-22 | End: 2021-11-02

## 2020-10-22 RX ORDER — DULAGLUTIDE 0.75 MG/.5ML
0.75 INJECTION, SOLUTION SUBCUTANEOUS
Qty: 2 ML | Refills: 3 | Status: SHIPPED | OUTPATIENT
Start: 2020-10-22 | End: 2021-03-01

## 2020-10-22 RX ORDER — ATORVASTATIN CALCIUM 80 MG/1
80 TABLET, FILM COATED ORAL DAILY
Qty: 30 TABLET | Refills: 0 | Status: SHIPPED | OUTPATIENT
Start: 2020-10-22 | End: 2020-12-09

## 2020-10-22 ASSESSMENT — MIFFLIN-ST. JEOR: SCORE: 1708.82

## 2020-10-22 NOTE — PATIENT INSTRUCTIONS
Mr. Tristan;  Overall you appear to be doing well.  A particular attention to your average blood glucose.  We will change your medications in order to get this level significantly #7 if possible.    I would recommend zoster vaccine given your current battles with prostate carcinoma.    Laboratory studies will be drawn today.    I like to see you back every 6 months for follow-up on blood pressure and blood sugar.    Best regards  Blood

## 2020-10-22 NOTE — PROGRESS NOTES
Subjective     Jaguar Tristan is a 79 year old male who presents to clinic today for the following health issues:    HPI Mr. Hager has a history of coronary artery disease, hyperlipidemia, type 2 diabetes, hypertension, and prostate carcinoma.    Currently he is on Lupron regarding his prostate carcinoma.  Does mention he had some night sweats from time to time with his stabilization.  No significant mood abnormalities no depression no increase in emotional lability    The patient/the patient otherwise is doing relatively well.  His blood glucose has been somewhat elevated he does admit that he has not been monitoring it in a regular basis.  We discussed this today as the important aspect to his diabetic control.  Most recent hemoglobin A1c was greater than 11.             Chief Complaint   Patient presents with     Establish Care       New Patient/Transfer of Care  New Patient/Transfer of Care    Review of Systems   Constitutional, HEENT, cardiovascular, pulmonary, gi and gu systems are negative, except as otherwise noted.      Objective    There were no vitals taken for this visit.  There is no height or weight on file to calculate BMI.  Physical Exam   GENERAL: healthy, alert and no distress  EYES: Eyes grossly normal to inspection, PERRL and conjunctivae and sclerae normal  HENT: ear canals and TM's normal, nose and mouth without ulcers or lesions  NECK: no adenopathy, no asymmetry, masses, or scars and thyroid normal to palpation  RESP: lungs clear to auscultation - no rales, rhonchi or wheezes  CV: regular rate and rhythm, normal S1 S2, no S3 or S4, no murmur, click or rub, no peripheral edema and peripheral pulses strong  ABDOMEN: soft, nontender, no hepatosplenomegaly, no masses and bowel sounds normal  MS: no gross musculoskeletal defects noted, no edema  SKIN: no suspicious lesions or rashes  NEURO: Normal strength and tone, mentation intact and speech normal  PSYCH: mentation appears normal, affect  "normal/bright    No results found for any visits on 10/22/20.        Assessment & Plan     Jaguar was seen today for establish care.    Diagnoses and all orders for this visit:    Encounter for preventive care  -     Comprehensive metabolic panel (BMP + Alb, Alk Phos, ALT, AST, Total. Bili, TP)  -     CBC with platelets and differential  -     TSH with free T4 reflex  -     Vitamin B12    Hyperlipidemia LDL goal <100  Comments:  krill oil stopped   Orders:  -     atorvastatin (LIPITOR) 80 MG tablet; Take 1 tablet (80 mg) by mouth daily - Establish care appointment with new provider needed for further refills    NSTEMI (non-ST elevated myocardial infarction) (H)  -     carvedilol (COREG) 12.5 MG tablet; Take 1 tablet (12.5 mg) by mouth 2 times daily    Type 2 diabetes mellitus with other specified complication, without long-term current use of insulin (H)  -     HEMOGLOBIN A1C  -     glimepiride (AMARYL) 4 MG tablet; Take 2 tablets (8 mg) by mouth every morning (before breakfast)  -     metFORMIN (GLUCOPHAGE) 1000 MG tablet; TAKE 1 TABLET BY MOUTH TWICE A DAY WITH MEALS  -     dulaglutide (TRULICITY) 0.75 MG/0.5ML pen; Inject 0.75 mg Subcutaneous every 7 days    Essential hypertension  -     lisinopril-hydrochlorothiazide (ZESTORETIC) 20-12.5 MG tablet; Take 1 tablet by mouth daily         BMI:   Estimated body mass index is 31.92 kg/m  as calculated from the following:    Height as of this encounter: 1.76 m (5' 9.29\").    Weight as of this encounter: 98.9 kg (218 lb).     The patient understands the diet and exercise are important component to his diabetic and hypertensive control as well as his cardiac health.       See Patient Instructions    Return in about 6 months (around 4/22/2021).    Aquiles Gold MD  [facility]    "

## 2020-10-22 NOTE — LETTER
[facility]  [address]  [address]  [place]  [address]  Tel: [phone]    October 26, 2020    Jaguar NETTE Tristan  [address] [address]        Dear Mr. Tristan,    The blood sugar is dramatically improved.  The hemoglobin A1c has come down nicely at 5.5.  All other tests look great.  Your thyroid levels were normal.  The only lab test that was low normal is your vitamin B12.  I would recommend that you start taking a B complex vitamin daily. A B- 50 complex vitamin should be sufficient.     Regards   Aquiles          Enclosure: Lab Results    Results for orders placed or performed in visit on 10/22/20   HEMOGLOBIN A1C     Status: None   Result Value Ref Range    Hemoglobin A1C 5.5 0 - 5.6 %   Comprehensive metabolic panel (BMP + Alb, Alk Phos, ALT, AST, Total. Bili, TP)     Status: Abnormal   Result Value Ref Range    Sodium 137 133 - 144 mmol/L    Potassium 4.4 3.4 - 5.3 mmol/L    Chloride 105 94 - 109 mmol/L    Carbon Dioxide 23 20 - 32 mmol/L    Anion Gap 9 3 - 14 mmol/L    Glucose 173 (H) 70 - 99 mg/dL    Urea Nitrogen 22 7 - 30 mg/dL    Creatinine 0.80 0.66 - 1.25 mg/dL    GFR Estimate 86 >60 mL/min/[1.73_m2]    GFR Estimate If Black >90 >60 mL/min/[1.73_m2]    Calcium 9.9 8.5 - 10.1 mg/dL    Bilirubin Total 0.5 0.2 - 1.3 mg/dL    Albumin 3.8 3.4 - 5.0 g/dL    Protein Total 7.3 6.8 - 8.8 g/dL    Alkaline Phosphatase 130 40 - 150 U/L    ALT 34 0 - 70 U/L    AST 17 0 - 45 U/L   CBC with platelets and differential     Status: None   Result Value Ref Range    WBC 8.7 4.0 - 11.0 10e9/L    RBC Count 4.68 4.4 - 5.9 10e12/L    Hemoglobin 13.9 13.3 - 17.7 g/dL    Hematocrit 40.8 40.0 - 53.0 %    MCV 87 78 - 100 fl    MCH 29.7 26.5 - 33.0 pg    MCHC 34.1 31.5 - 36.5 g/dL    RDW 13.3 10.0 - 15.0 %    Platelet Count 201 150 - 450 10e9/L    % Neutrophils 78.2 %    % Lymphocytes 11.0 %    % Monocytes 9.4 %    % Eosinophils 0.8 %    % Basophils 0.6 %    Absolute Neutrophil 6.8 1.6 - 8.3 10e9/L     Absolute Lymphocytes 1.0 0.8 - 5.3 10e9/L    Absolute Monocytes 0.8 0.0 - 1.3 10e9/L    Absolute Eosinophils 0.1 0.0 - 0.7 10e9/L    Absolute Basophils 0.1 0.0 - 0.2 10e9/L    Diff Method Automated Method    TSH with free T4 reflex     Status: None   Result Value Ref Range    TSH 1.33 0.40 - 4.00 mU/L   Vitamin B12     Status: None   Result Value Ref Range    Vitamin B12 251 193 - 986 pg/mL

## 2020-10-23 LAB
ALBUMIN SERPL-MCNC: 3.8 G/DL (ref 3.4–5)
ALP SERPL-CCNC: 130 U/L (ref 40–150)
ALT SERPL W P-5'-P-CCNC: 34 U/L (ref 0–70)
ANION GAP SERPL CALCULATED.3IONS-SCNC: 9 MMOL/L (ref 3–14)
AST SERPL W P-5'-P-CCNC: 17 U/L (ref 0–45)
BILIRUB SERPL-MCNC: 0.5 MG/DL (ref 0.2–1.3)
BUN SERPL-MCNC: 22 MG/DL (ref 7–30)
CALCIUM SERPL-MCNC: 9.9 MG/DL (ref 8.5–10.1)
CHLORIDE SERPL-SCNC: 105 MMOL/L (ref 94–109)
CO2 SERPL-SCNC: 23 MMOL/L (ref 20–32)
CREAT SERPL-MCNC: 0.8 MG/DL (ref 0.66–1.25)
GFR SERPL CREATININE-BSD FRML MDRD: 86 ML/MIN/{1.73_M2}
GLUCOSE SERPL-MCNC: 173 MG/DL (ref 70–99)
POTASSIUM SERPL-SCNC: 4.4 MMOL/L (ref 3.4–5.3)
PROT SERPL-MCNC: 7.3 G/DL (ref 6.8–8.8)
SODIUM SERPL-SCNC: 137 MMOL/L (ref 133–144)
TSH SERPL DL<=0.005 MIU/L-ACNC: 1.33 MU/L (ref 0.4–4)

## 2020-11-13 DIAGNOSIS — I10 ESSENTIAL HYPERTENSION: Primary | ICD-10-CM

## 2020-11-13 DIAGNOSIS — E78.2 MIXED HYPERLIPIDEMIA: ICD-10-CM

## 2020-11-13 DIAGNOSIS — I25.10 CORONARY ARTERY DISEASE INVOLVING NATIVE CORONARY ARTERY OF NATIVE HEART WITHOUT ANGINA PECTORIS: ICD-10-CM

## 2020-11-17 DIAGNOSIS — I10 ESSENTIAL HYPERTENSION: ICD-10-CM

## 2020-11-17 DIAGNOSIS — I25.10 CORONARY ARTERY DISEASE INVOLVING NATIVE CORONARY ARTERY OF NATIVE HEART WITHOUT ANGINA PECTORIS: ICD-10-CM

## 2020-11-17 DIAGNOSIS — E78.2 MIXED HYPERLIPIDEMIA: ICD-10-CM

## 2020-11-17 LAB
ALT SERPL W P-5'-P-CCNC: 35 U/L (ref 0–70)
ANION GAP SERPL CALCULATED.3IONS-SCNC: 8 MMOL/L (ref 3–14)
BUN SERPL-MCNC: 16 MG/DL (ref 7–30)
CALCIUM SERPL-MCNC: 9.3 MG/DL (ref 8.5–10.1)
CHLORIDE SERPL-SCNC: 104 MMOL/L (ref 94–109)
CHOLEST SERPL-MCNC: 146 MG/DL
CO2 SERPL-SCNC: 25 MMOL/L (ref 20–32)
CREAT SERPL-MCNC: 0.75 MG/DL (ref 0.66–1.25)
GFR SERPL CREATININE-BSD FRML MDRD: 88 ML/MIN/{1.73_M2}
GLUCOSE SERPL-MCNC: 234 MG/DL (ref 70–99)
HDLC SERPL-MCNC: 40 MG/DL
LDLC SERPL CALC-MCNC: 61 MG/DL
NONHDLC SERPL-MCNC: 106 MG/DL
POTASSIUM SERPL-SCNC: 4.2 MMOL/L (ref 3.4–5.3)
SODIUM SERPL-SCNC: 137 MMOL/L (ref 133–144)
TRIGL SERPL-MCNC: 226 MG/DL

## 2020-11-17 PROCEDURE — 84460 ALANINE AMINO (ALT) (SGPT): CPT | Performed by: INTERNAL MEDICINE

## 2020-11-17 PROCEDURE — 36415 COLL VENOUS BLD VENIPUNCTURE: CPT | Performed by: INTERNAL MEDICINE

## 2020-11-17 PROCEDURE — 80061 LIPID PANEL: CPT | Performed by: INTERNAL MEDICINE

## 2020-11-17 PROCEDURE — 80048 BASIC METABOLIC PNL TOTAL CA: CPT | Performed by: INTERNAL MEDICINE

## 2020-12-03 ENCOUNTER — OFFICE VISIT (OUTPATIENT)
Dept: CARDIOLOGY | Facility: CLINIC | Age: 80
End: 2020-12-03
Payer: MEDICARE

## 2020-12-03 VITALS
BODY MASS INDEX: 32.29 KG/M2 | WEIGHT: 218 LBS | HEART RATE: 78 BPM | DIASTOLIC BLOOD PRESSURE: 84 MMHG | HEIGHT: 69 IN | SYSTOLIC BLOOD PRESSURE: 136 MMHG

## 2020-12-03 DIAGNOSIS — I10 ESSENTIAL HYPERTENSION: Primary | ICD-10-CM

## 2020-12-03 DIAGNOSIS — I25.10 CORONARY ARTERY DISEASE INVOLVING NATIVE CORONARY ARTERY OF NATIVE HEART WITHOUT ANGINA PECTORIS: ICD-10-CM

## 2020-12-03 DIAGNOSIS — E78.2 MIXED HYPERLIPIDEMIA: ICD-10-CM

## 2020-12-03 DIAGNOSIS — E78.1 HYPERTRIGLYCERIDEMIA: ICD-10-CM

## 2020-12-03 PROCEDURE — 99213 OFFICE O/P EST LOW 20 MIN: CPT | Performed by: INTERNAL MEDICINE

## 2020-12-03 RX ORDER — ICOSAPENT ETHYL 1 G/1
2 CAPSULE ORAL 2 TIMES DAILY
Qty: 120 CAPSULE | Refills: 11 | Status: SHIPPED | OUTPATIENT
Start: 2020-12-03 | End: 2021-03-04

## 2020-12-03 ASSESSMENT — MIFFLIN-ST. JEOR: SCORE: 1708.82

## 2020-12-03 NOTE — PROGRESS NOTES
HPI and Plan:   See dictation    Orders Placed This Encounter   Procedures     Lipid Profile     ALT     Lipid Profile     ALT     Basic metabolic panel     Follow-Up with Cardiologist       Orders Placed This Encounter   Medications     vitamin B complex with vitamin C (VITAMIN  B COMPLEX) tablet     Sig: Take 1 tablet by mouth daily     Icosapent Ethyl 1 g CAPS     Sig: Take 2 g by mouth 2 times daily     Dispense:  120 capsule     Refill:  11     Take with food       Medications Discontinued During This Encounter   Medication Reason     calcium gluconate 500 MG tablet Stopped by Patient     Cholecalciferol (VITAMIN D3) Stop at Discharge         Encounter Diagnoses   Name Primary?     Essential hypertension Yes     Mixed hyperlipidemia      Coronary artery disease involving native coronary artery of native heart without angina pectoris      Hypertriglyceridemia        CURRENT MEDICATIONS:  Current Outpatient Medications   Medication Sig Dispense Refill     atorvastatin (LIPITOR) 80 MG tablet Take 1 tablet (80 mg) by mouth daily - Establish care appointment with new provider needed for further refills 30 tablet 0     carvedilol (COREG) 12.5 MG tablet Take 1 tablet (12.5 mg) by mouth 2 times daily 180 tablet 1     dulaglutide (TRULICITY) 0.75 MG/0.5ML pen Inject 0.75 mg Subcutaneous every 7 days 2 mL 3     glimepiride (AMARYL) 4 MG tablet Take 2 tablets (8 mg) by mouth every morning (before breakfast) 180 tablet 0     Glucosamine-Chondroitin (OSTEO BI-FLEX REGULAR STRENGTH PO) Take 1 tablet by mouth as needed        hydrocortisone valerate (WEST-ANA PAULA) 0.2 % ointment Apply sparingly to affected area at face two times daily for no more than 14 days. 15 g 1     Icosapent Ethyl 1 g CAPS Take 2 g by mouth 2 times daily 120 capsule 11     Leuprolide Acetate (LUPRON IJ)        lisinopril-hydrochlorothiazide (ZESTORETIC) 20-12.5 MG tablet Take 1 tablet by mouth daily 90 tablet 3     metFORMIN (GLUCOPHAGE) 1000 MG tablet  TAKE 1 TABLET BY MOUTH TWICE A DAY WITH MEALS 180 tablet 3     minocycline (MINOCIN,DYNACIN) 50 MG capsule Take 50 mg by mouth daily as needed Prn for rosacea       Multiple Vitamins-Minerals (PRESERVISION AREDS PO) Take 1 tablet by mouth 2 times daily       nitroglycerin (NITROSTAT) 0.4 MG SL tablet Place 1 tablet (0.4 mg) under the tongue every 5 minutes as needed for chest pain if you are still having symptoms after 3 doses (15 minutes) call 911. 25 tablet 1     triamcinolone (KENALOG) 0.1 % cream Apply sparingly to affected area two times daily for no more than 14 days.  Do not apply to 30 g 2     vitamin B complex with vitamin C (VITAMIN  B COMPLEX) tablet Take 1 tablet by mouth daily       Alcohol Swabs (ALCOHOL PADS) 70 % PADS 1 pad 2 times daily For use after pricking finger for checking blood sugar at home. 100 each 11     aspirin 81 MG tablet Take 81 mg by mouth daily        blood glucose (NO BRAND SPECIFIED) lancets standard Use to test blood sugar 2 times daily as directed. 100 each 11     blood glucose monitoring (NO BRAND SPECIFIED) test strip Use to test blood sugars 2 times daily as directed 100 strip 11     Blood Pressure Monitoring KIT Use to test blood sugars 2 times daily as directed 1 kit 0       ALLERGIES     Allergies   Allergen Reactions     Other  [No Clinical Screening - See Comments]      PN: LW FI1: NKA LW FI2: nka  PN: LW CM1: Contrast Adverse Reaction - None Reaction :  PN: LW Other1: -NKA       PAST MEDICAL HISTORY:  Past Medical History:   Diagnosis Date     Coronary artery disease 02/22/2016    Cardiac cath 2016: JAYME x2 to RCA     Diabetes mellitus (H) 2005     Elevated PSA 2009    bx neg Dr. Dill     Erectile dysfunction      History of colonoscopy 2012    nl     Hypercholesteremia 2002     Hypertension 2002     Nephrolithiasis 2007     Prostate cancer (H) 2013    chemotherapy for metastasis to spine 2016 (cleared), prostatectomy and radiation 2013     Rosacea        PAST SURGICAL  HISTORY:  Past Surgical History:   Procedure Laterality Date     COLONOSCOPY  1/20/2012    Procedure:COLONOSCOPY; COLONOSCOPY; Surgeon:ELSY GARCIA; Location: GI     CORONARY ANGIOGRAPHY ADULT ORDER       HEART CATH, ANGIOPLASTY  02/22/2016    JAYME x2 to RCA     LEG SURGERY  2005     robotic surgery for prostate cancer  2013    Dr. Dudley     TONSILLECTOMY  7       FAMILY HISTORY:  Family History   Problem Relation Age of Onset     Cancer Mother        SOCIAL HISTORY:  Social History     Socioeconomic History     Marital status:      Spouse name: None     Number of children: None     Years of education: None     Highest education level: None   Occupational History     Occupation: [employer]   Social Needs     Financial resource strain: None     Food insecurity     Worry: None     Inability: None     Transportation needs     Medical: None     Non-medical: None   Tobacco Use     Smoking status: Never Smoker     Smokeless tobacco: Never Used   Substance and Sexual Activity     Alcohol use: Yes     Alcohol/week: 1.0 - 2.0 standard drinks     Types: 1 - 2 Glasses of wine per week     Comment: 3-4 glasses wine per week     Drug use: No     Sexual activity: Yes     Partners: Female   Lifestyle     Physical activity     Days per week: None     Minutes per session: None     Stress: None   Relationships     Social connections     Talks on phone: None     Gets together: None     Attends Mosque service: None     Active member of club or organization: None     Attends meetings of clubs or organizations: None     Relationship status: None     Intimate partner violence     Fear of current or ex partner: None     Emotionally abused: None     Physically abused: None     Forced sexual activity: None   Other Topics Concern     Parent/sibling w/ CABG, MI or angioplasty before 65F 55M? Not Asked      Service Not Asked     Blood Transfusions Not Asked     Caffeine Concern No     Comment: no caffeine      "Occupational Exposure Not Asked     Hobby Hazards Not Asked     Sleep Concern No     Stress Concern No     Weight Concern No     Special Diet Yes     Comment: cut out pop and sodium     Back Care Not Asked     Exercise Yes     Comment: walking a lot, cardio rehab     Bike Helmet Not Asked     Seat Belt Not Asked     Self-Exams Not Asked   Social History Narrative    so I said we could give him an Accu-Chek Simi from the clinic (I may drop it off with you on 3/2/16).             Review of Systems:  Skin:  Negative       Eyes:  Positive for glasses    ENT:  Negative      Respiratory:  Positive for dyspnea on exertion with steps   Cardiovascular:  Negative      Gastroenterology: Negative      Genitourinary:  not assessed      Musculoskeletal:  Negative      Neurologic:  Negative      Psychiatric:  Negative      Heme/Lymph/Imm:  Negative      Endocrine:  Positive for diabetes      Physical Exam:  Vitals: /84   Pulse 78   Ht 1.76 m (5' 9.29\")   Wt 98.9 kg (218 lb)   BMI 31.92 kg/m      Constitutional:  cooperative, alert and oriented, well developed, well nourished, in no acute distress overweight      Skin:  warm and dry to the touch, no apparent skin lesions or masses noted          Head:  normocephalic, no masses or lesions        Eyes:  pupils equal and round, conjunctivae and lids unremarkable, sclera white, no xanthalasma, EOMS intact, no nystagmus        Lymph:      ENT:  no pallor or cyanosis, dentition good        Neck:           Respiratory:  normal breath sounds, clear to auscultation, normal A-P diameter, normal symmetry, normal respiratory excursion, no use of accessory muscles         Cardiac: regular rhythm;normal S1 and S2;apical impulse not displaced   S4 no presence of murmur          pulses full and equal, no bruits auscultated                                        GI:  abdomen soft, non-tender, BS normoactive, no mass, no HSM, no bruits        Extremities and Muscular Skeletal:  no " deformities, clubbing, cyanosis, erythema observed;no edema         right radial site clean without hum or bruit    Neurological:  no gross motor deficits;affect appropriate        Psych:  Alert and Oriented x 3        CC  Stephan Oziel Cedeno MD  [id number] [place] [address]

## 2020-12-03 NOTE — LETTER
12/3/2020    Aquiles Gold MD  [address]  [facility] [address]    RE: Jaguar Tristan       Dear Colleague,    I had the pleasure of seeing Jaguar Tristan in the [facility].    HPI and Plan:   See dictation    Orders Placed This Encounter   Procedures     Lipid Profile     ALT     Lipid Profile     ALT     Basic metabolic panel     Follow-Up with Cardiologist       Orders Placed This Encounter   Medications     vitamin B complex with vitamin C (VITAMIN  B COMPLEX) tablet     Sig: Take 1 tablet by mouth daily     Icosapent Ethyl 1 g CAPS     Sig: Take 2 g by mouth 2 times daily     Dispense:  120 capsule     Refill:  11     Take with food       Medications Discontinued During This Encounter   Medication Reason     calcium gluconate 500 MG tablet Stopped by Patient     Cholecalciferol (VITAMIN D3) Stop at Discharge         Encounter Diagnoses   Name Primary?     Essential hypertension Yes     Mixed hyperlipidemia      Coronary artery disease involving native coronary artery of native heart without angina pectoris      Hypertriglyceridemia        CURRENT MEDICATIONS:  Current Outpatient Medications   Medication Sig Dispense Refill     atorvastatin (LIPITOR) 80 MG tablet Take 1 tablet (80 mg) by mouth daily - Establish care appointment with new provider needed for further refills 30 tablet 0     carvedilol (COREG) 12.5 MG tablet Take 1 tablet (12.5 mg) by mouth 2 times daily 180 tablet 1     dulaglutide (TRULICITY) 0.75 MG/0.5ML pen Inject 0.75 mg Subcutaneous every 7 days 2 mL 3     glimepiride (AMARYL) 4 MG tablet Take 2 tablets (8 mg) by mouth every morning (before breakfast) 180 tablet 0     Glucosamine-Chondroitin (OSTEO BI-FLEX REGULAR STRENGTH PO) Take 1 tablet by mouth as needed        hydrocortisone valerate (WEST-ANA PAULA) 0.2 % ointment Apply sparingly to affected area at face two times daily for no more than 14 days. 15 g 1     Icosapent Ethyl 1 g CAPS Take 2 g by mouth  2 times daily 120 capsule 11     Leuprolide Acetate (LUPRON IJ)        lisinopril-hydrochlorothiazide (ZESTORETIC) 20-12.5 MG tablet Take 1 tablet by mouth daily 90 tablet 3     metFORMIN (GLUCOPHAGE) 1000 MG tablet TAKE 1 TABLET BY MOUTH TWICE A DAY WITH MEALS 180 tablet 3     minocycline (MINOCIN,DYNACIN) 50 MG capsule Take 50 mg by mouth daily as needed Prn for rosacea       Multiple Vitamins-Minerals (PRESERVISION AREDS PO) Take 1 tablet by mouth 2 times daily       nitroglycerin (NITROSTAT) 0.4 MG SL tablet Place 1 tablet (0.4 mg) under the tongue every 5 minutes as needed for chest pain if you are still having symptoms after 3 doses (15 minutes) call 911. 25 tablet 1     triamcinolone (KENALOG) 0.1 % cream Apply sparingly to affected area two times daily for no more than 14 days.  Do not apply to 30 g 2     vitamin B complex with vitamin C (VITAMIN  B COMPLEX) tablet Take 1 tablet by mouth daily       Alcohol Swabs (ALCOHOL PADS) 70 % PADS 1 pad 2 times daily For use after pricking finger for checking blood sugar at home. 100 each 11     aspirin 81 MG tablet Take 81 mg by mouth daily        blood glucose (NO BRAND SPECIFIED) lancets standard Use to test blood sugar 2 times daily as directed. 100 each 11     blood glucose monitoring (NO BRAND SPECIFIED) test strip Use to test blood sugars 2 times daily as directed 100 strip 11     Blood Pressure Monitoring KIT Use to test blood sugars 2 times daily as directed 1 kit 0       ALLERGIES     Allergies   Allergen Reactions     Other  [No Clinical Screening - See Comments]      PN: LW FI1: NKA LW FI2: nka  PN: LW CM1: Contrast Adverse Reaction - None Reaction :  PN: LW Other1: -NKA       PAST MEDICAL HISTORY:  Past Medical History:   Diagnosis Date     Coronary artery disease 02/22/2016    Cardiac cath 2016: JAYME x2 to RCA     Diabetes mellitus (H) 2005     Elevated PSA 2009    bx neg Dr. Dill     Erectile dysfunction      History of colonoscopy 2012    nl      Hypercholesteremia 2002     Hypertension 2002     Nephrolithiasis 2007     Prostate cancer (H) 2013    chemotherapy for metastasis to spine 2016 (cleared), prostatectomy and radiation 2013     Rosacea        PAST SURGICAL HISTORY:  Past Surgical History:   Procedure Laterality Date     COLONOSCOPY  1/20/2012    Procedure:COLONOSCOPY; COLONOSCOPY; Surgeon:ELSY GARCIA; Location: GI     CORONARY ANGIOGRAPHY ADULT ORDER       HEART CATH, ANGIOPLASTY  02/22/2016    JAYME x2 to RCA     LEG SURGERY  2005     robotic surgery for prostate cancer  2013    Dr. Dudley     TONSILLECTOMY  7       FAMILY HISTORY:  Family History   Problem Relation Age of Onset     Cancer Mother        SOCIAL HISTORY:  Social History     Socioeconomic History     Marital status:      Spouse name: None     Number of children: None     Years of education: None     Highest education level: None   Occupational History     Occupation: [employer]   Social Needs     Financial resource strain: None     Food insecurity     Worry: None     Inability: None     Transportation needs     Medical: None     Non-medical: None   Tobacco Use     Smoking status: Never Smoker     Smokeless tobacco: Never Used   Substance and Sexual Activity     Alcohol use: Yes     Alcohol/week: 1.0 - 2.0 standard drinks     Types: 1 - 2 Glasses of wine per week     Comment: 3-4 glasses wine per week     Drug use: No     Sexual activity: Yes     Partners: Female   Lifestyle     Physical activity     Days per week: None     Minutes per session: None     Stress: None   Relationships     Social connections     Talks on phone: None     Gets together: None     Attends Yarsanism service: None     Active member of club or organization: None     Attends meetings of clubs or organizations: None     Relationship status: None     Intimate partner violence     Fear of current or ex partner: None     Emotionally abused: None     Physically abused: None     Forced sexual activity:  "None   Other Topics Concern     Parent/sibling w/ CABG, MI or angioplasty before 65F 55M? Not Asked      Service Not Asked     Blood Transfusions Not Asked     Caffeine Concern No     Comment: no caffeine     Occupational Exposure Not Asked     Hobby Hazards Not Asked     Sleep Concern No     Stress Concern No     Weight Concern No     Special Diet Yes     Comment: cut out pop and sodium     Back Care Not Asked     Exercise Yes     Comment: walking a lot, cardio rehab     Bike Helmet Not Asked     Seat Belt Not Asked     Self-Exams Not Asked   Social History Narrative    so I said we could give him an Accu-Chek Simi from the clinic (I may drop it off with you on 3/2/16).             Review of Systems:  Skin:  Negative       Eyes:  Positive for glasses    ENT:  Negative      Respiratory:  Positive for dyspnea on exertion with steps   Cardiovascular:  Negative      Gastroenterology: Negative      Genitourinary:  not assessed      Musculoskeletal:  Negative      Neurologic:  Negative      Psychiatric:  Negative      Heme/Lymph/Imm:  Negative      Endocrine:  Positive for diabetes      Physical Exam:  Vitals: /84   Pulse 78   Ht 1.76 m (5' 9.29\")   Wt 98.9 kg (218 lb)   BMI 31.92 kg/m      Constitutional:  cooperative, alert and oriented, well developed, well nourished, in no acute distress overweight      Skin:  warm and dry to the touch, no apparent skin lesions or masses noted          Head:  normocephalic, no masses or lesions        Eyes:  pupils equal and round, conjunctivae and lids unremarkable, sclera white, no xanthalasma, EOMS intact, no nystagmus        Lymph:      ENT:  no pallor or cyanosis, dentition good        Neck:           Respiratory:  normal breath sounds, clear to auscultation, normal A-P diameter, normal symmetry, normal respiratory excursion, no use of accessory muscles         Cardiac: regular rhythm;normal S1 and S2;apical impulse not displaced   S4 no presence of murmur      "     pulses full and equal, no bruits auscultated                                        GI:  abdomen soft, non-tender, BS normoactive, no mass, no HSM, no bruits        Extremities and Muscular Skeletal:  no deformities, clubbing, cyanosis, erythema observed;no edema         right radial site clean without hum or bruit    Neurological:  no gross motor deficits;affect appropriate        Psych:  Alert and Oriented x 3        CC  Stephan Cedeno MD  [address]  [place] [address]                  Thank you for allowing me to participate in the care of your patient.      Sincerely,     Lg Mancilla MD, MD     [facility]    cc:   Stephan Cedeno MD  [address]  [place] [address]

## 2020-12-03 NOTE — PROGRESS NOTES
Service Date: 12/03/2020      CARDIOLOGY FOLLOWUP VISIT       REFERRING PHYSICIAN:  Aquiles Gold MD      HISTORY OF PRESENT ILLNESS:  It was my pleasure to see your patient Jaguar Tristan in followup.  Reverend Tristan is an extremely pleasant, 79-year-old man with a past history of a non-ST elevation inferior myocardial infarct and had 2 stents placed in the distal right coronary artery in 02/2016.  He has a history of diabetes mellitus, essential hypertension and hyperlipidemia with a significant component of hypertriglyceridemia.      Since I last saw him, he has done well.  He has no chest pains, chest pressure or unusual shortness of breath.  However, he was complaining of some left arm discomfort, which was there continuously, but is improving, and he thinks that it may have been due to a radiological procedure that he had done at [facility].  He does have metastatic prostate cancer and did have a lesion at T1 treated by radiotherapy, I believe.  The arm discomfort does not sound cardiac.  It is not associated with exertion.  It is not relieved by rest.  It was there continuously, but is improving now.      With respect to his lipids, these were drawn on 11/17 of this year.  The LDL is excellent at 61.  The HDL is also low normal at 40, but his triglycerides are persistently raised at 226.  His total cholesterol is 146.  His liver function tests are normal with an ALT of 35.  Certainly, the hypertriglyceridemia can be associated with his diabetes mellitus.  He is only mildly overweight, however, and he does try to exercise on a stationary bike.  His glucose at the time that his triglycerides were raised was 234, which possibly had a bearing on that.  His blood pressure is normal at 136/84.  Pulse is normal at 78 beats per minute.      IMPRESSION:   1.  Coronary artery disease and status post non-Q-wave myocardial infarct.  The patient is asymptomatic with respect to coronary artery disease with no symptoms  suggestive of angina pectoris.   2.  Significant and persistent hypertriglyceridemia.  This is probably related to diabetes mellitus.   3.  Normotensive.   4.  History of metastatic prostate cancer, which appears to be quiescent and under good control.      PLAN:  I think Vascepa would be an excellent choice for him with respect to his persistent hypertriglyceridemia.  He has a clearcut indication for this medication in that he has coronary artery disease with prior stenting of the right coronary artery and has persistent hypertriglyceridemia.  The REDUCE-IT trial did show 25% reduction in cardiac event rates in patients with cardiovascular disease and hypertriglyceridemia.  He should take 2 g twice a day with meals.  I will continue all of his other present medications.  I will check his lipids in 3 months' time to see if his triglycerides have improved and if his HDL has improved.  I will see him back again in 1 year's time, but as always, he has been told to contact us if he has any questions or any concerns.      cc:   [place] [address]         JOE TOWNSEND MD, [facility]             D: 2020   T: 2020   MT: fortino      Name:     ESTEFANIA HERNANDEZ   MRN:      [id number]        Account:      [id number]   :      1940           Service Date: 2020      Document: [id number]

## 2020-12-03 NOTE — LETTER
12/3/2020      Aquiles Gold MD  [address]  [facility] [address]      RE: Jaguar Tristan       Dear Colleague,    I had the pleasure of seeing Jaguar Tristan in the [facility].    Service Date: 12/03/2020      CARDIOLOGY FOLLOWUP VISIT       REFERRING PHYSICIAN:  Aquiles Gold MD      HISTORY OF PRESENT ILLNESS:  It was my pleasure to see your patient Jaguar Tristan in followup.  Reverend Tristan is an extremely pleasant, 79-year-old man with a past history of a non-ST elevation inferior myocardial infarct and had 2 stents placed in the distal right coronary artery in 02/2016.  He has a history of diabetes mellitus, essential hypertension and hyperlipidemia with a significant component of hypertriglyceridemia.      Since I last saw him, he has done well.  He has no chest pains, chest pressure or unusual shortness of breath.  However, he was complaining of some left arm discomfort, which was there continuously, but is improving, and he thinks that it may have been due to a radiological procedure that he had done at [facility].  He does have metastatic prostate cancer and did have a lesion at T1 treated by radiotherapy, I believe.  The arm discomfort does not sound cardiac.  It is not associated with exertion.  It is not relieved by rest.  It was there continuously, but is improving now.      With respect to his lipids, these were drawn on 11/17 of this year.  The LDL is excellent at 61.  The HDL is also low normal at 40, but his triglycerides are persistently raised at 226.  His total cholesterol is 146.  His liver function tests are normal with an ALT of 35.  Certainly, the hypertriglyceridemia can be associated with his diabetes mellitus.  He is only mildly overweight, however, and he does try to exercise on a stationary bike.  His glucose at the time that his triglycerides were raised was 234, which possibly had a bearing on that.  His blood pressure is normal at 136/84.   Pulse is normal at 78 beats per minute.      IMPRESSION:   1.  Coronary artery disease and status post non-Q-wave myocardial infarct.  The patient is asymptomatic with respect to coronary artery disease with no symptoms suggestive of angina pectoris.   2.  Significant and persistent hypertriglyceridemia.  This is probably related to diabetes mellitus.   3.  Normotensive.   4.  History of metastatic prostate cancer, which appears to be quiescent and under good control.      PLAN:  I think Vascepa would be an excellent choice for him with respect to his persistent hypertriglyceridemia.  He has a clearcut indication for this medication in that he has coronary artery disease with prior stenting of the right coronary artery and has persistent hypertriglyceridemia.  The REDUCE-IT trial did show 25% reduction in cardiac event rates in patients with cardiovascular disease and hypertriglyceridemia.  He should take 2 g twice a day with meals.  I will continue all of his other present medications.  I will check his lipids in 3 months' time to see if his triglycerides have improved and if his HDL has improved.  I will see him back again in 1 year's time, but as always, he has been told to contact us if he has any questions or any concerns.      cc:   [place] [address]         JOE TOWNSEND MD, [facility]             D: 2020   T: 2020   MT: fortino      Name:     ESTEFANIA HERNANDEZ   MRN:      [id number]        Account:      [id number]   :      1940           Service Date: 2020      Document: [id number]          Outpatient Encounter Medications as of 12/3/2020   Medication Sig Dispense Refill     atorvastatin (LIPITOR) 80 MG tablet Take 1 tablet (80 mg) by mouth daily - Establish care appointment with new provider needed for further refills 30 tablet 0     carvedilol (COREG) 12.5 MG tablet Take 1 tablet (12.5 mg) by mouth 2 times  daily 180 tablet 1     dulaglutide (TRULICITY) 0.75 MG/0.5ML pen Inject 0.75 mg Subcutaneous every 7 days 2 mL 3     glimepiride (AMARYL) 4 MG tablet Take 2 tablets (8 mg) by mouth every morning (before breakfast) 180 tablet 0     Glucosamine-Chondroitin (OSTEO BI-FLEX REGULAR STRENGTH PO) Take 1 tablet by mouth as needed        hydrocortisone valerate (WEST-ANA PAULA) 0.2 % ointment Apply sparingly to affected area at face two times daily for no more than 14 days. 15 g 1     Icosapent Ethyl 1 g CAPS Take 2 g by mouth 2 times daily 120 capsule 11     Leuprolide Acetate (LUPRON IJ)        lisinopril-hydrochlorothiazide (ZESTORETIC) 20-12.5 MG tablet Take 1 tablet by mouth daily 90 tablet 3     metFORMIN (GLUCOPHAGE) 1000 MG tablet TAKE 1 TABLET BY MOUTH TWICE A DAY WITH MEALS 180 tablet 3     minocycline (MINOCIN,DYNACIN) 50 MG capsule Take 50 mg by mouth daily as needed Prn for rosacea       Multiple Vitamins-Minerals (PRESERVISION AREDS PO) Take 1 tablet by mouth 2 times daily       nitroglycerin (NITROSTAT) 0.4 MG SL tablet Place 1 tablet (0.4 mg) under the tongue every 5 minutes as needed for chest pain if you are still having symptoms after 3 doses (15 minutes) call 911. 25 tablet 1     triamcinolone (KENALOG) 0.1 % cream Apply sparingly to affected area two times daily for no more than 14 days.  Do not apply to 30 g 2     vitamin B complex with vitamin C (VITAMIN  B COMPLEX) tablet Take 1 tablet by mouth daily       Alcohol Swabs (ALCOHOL PADS) 70 % PADS 1 pad 2 times daily For use after pricking finger for checking blood sugar at home. 100 each 11     aspirin 81 MG tablet Take 81 mg by mouth daily        blood glucose (NO BRAND SPECIFIED) lancets standard Use to test blood sugar 2 times daily as directed. 100 each 11     blood glucose monitoring (NO BRAND SPECIFIED) test strip Use to test blood sugars 2 times daily as directed 100 strip 11     Blood Pressure Monitoring KIT Use to test blood sugars 2 times daily as  directed 1 kit 0     [DISCONTINUED] calcium gluconate 500 MG tablet Take 600 mg by mouth 2 times daily Take once daily       [DISCONTINUED] Cholecalciferol (VITAMIN D3) 3,000 Units daily        No facility-administered encounter medications on file as of 12/3/2020.        Again, thank you for allowing me to participate in the care of your patient.      Sincerely,    Lg Mancilla MD, MD     [facility]    cc:   Stephan Cedeno MD  [address] [facility] [address] [address]

## 2020-12-09 DIAGNOSIS — E78.5 HYPERLIPIDEMIA LDL GOAL <100: ICD-10-CM

## 2020-12-09 RX ORDER — ATORVASTATIN CALCIUM 80 MG/1
80 TABLET, FILM COATED ORAL DAILY
Qty: 90 TABLET | Refills: 3 | Status: SHIPPED | OUTPATIENT
Start: 2020-12-09 | End: 2021-11-22

## 2021-02-25 DIAGNOSIS — E11.69 TYPE 2 DIABETES MELLITUS WITH OTHER SPECIFIED COMPLICATION, WITHOUT LONG-TERM CURRENT USE OF INSULIN (H): ICD-10-CM

## 2021-02-25 NOTE — TELEPHONE ENCOUNTER
Reason for Call:  Medication or medication refill:    Do you use a [facility]?  Name of the pharmacy and phone number for the current request:       [facility] [address] [place] [facility] [address] [address]    Name of the medication requested: dulaglutide (TRULICITY) 0.75 MG/0.5ML pen    Other request: Please refill The patient is completely out  The patient would like to pick it up on Saturday   He has received messages from the pharmacy for the past week saying that we have not responded, informed patient that we have not received a request yet    Can we leave a detailed message on this number? YES    Phone number patient can be reached at: Cell number on file:    Telephone Information:   Mobile [phone]       Best Time: anytime    Call taken on 2/25/2021 at 8:49 AM by Deepa Malin

## 2021-03-01 RX ORDER — DULAGLUTIDE 0.75 MG/.5ML
0.75 INJECTION, SOLUTION SUBCUTANEOUS
Qty: 2 ML | Refills: 1 | Status: SHIPPED | OUTPATIENT
Start: 2021-03-01 | End: 2021-04-12

## 2021-03-04 DIAGNOSIS — I25.10 CORONARY ARTERY DISEASE INVOLVING NATIVE CORONARY ARTERY OF NATIVE HEART WITHOUT ANGINA PECTORIS: ICD-10-CM

## 2021-03-04 DIAGNOSIS — E78.1 HYPERTRIGLYCERIDEMIA: ICD-10-CM

## 2021-03-04 RX ORDER — ICOSAPENT ETHYL 1 G/1
2 CAPSULE ORAL 2 TIMES DAILY
Qty: 180 CAPSULE | Refills: 0 | Status: SHIPPED | OUTPATIENT
Start: 2021-03-04 | End: 2021-03-25

## 2021-03-18 ENCOUNTER — OFFICE VISIT (OUTPATIENT)
Dept: FAMILY MEDICINE | Facility: CLINIC | Age: 81
End: 2021-03-18
Payer: MEDICARE

## 2021-03-18 VITALS
TEMPERATURE: 97.6 F | OXYGEN SATURATION: 97 % | HEART RATE: 81 BPM | WEIGHT: 224 LBS | SYSTOLIC BLOOD PRESSURE: 136 MMHG | DIASTOLIC BLOOD PRESSURE: 75 MMHG | BODY MASS INDEX: 33.18 KG/M2 | HEIGHT: 69 IN

## 2021-03-18 DIAGNOSIS — R79.9 ABNORMAL FINDING OF BLOOD CHEMISTRY, UNSPECIFIED: ICD-10-CM

## 2021-03-18 DIAGNOSIS — Z01.818 PREOP GENERAL PHYSICAL EXAM: Primary | ICD-10-CM

## 2021-03-18 LAB
BASOPHILS # BLD AUTO: 0.1 10E9/L (ref 0–0.2)
BASOPHILS NFR BLD AUTO: 0.9 %
DIFFERENTIAL METHOD BLD: NORMAL
EOSINOPHIL # BLD AUTO: 0.1 10E9/L (ref 0–0.7)
EOSINOPHIL NFR BLD AUTO: 0.9 %
ERYTHROCYTE [DISTWIDTH] IN BLOOD BY AUTOMATED COUNT: 13.6 % (ref 10–15)
HBA1C MFR BLD: 9.6 % (ref 0–5.6)
HCT VFR BLD AUTO: 41.1 % (ref 40–53)
HGB BLD-MCNC: 13.9 G/DL (ref 13.3–17.7)
LYMPHOCYTES # BLD AUTO: 1 10E9/L (ref 0.8–5.3)
LYMPHOCYTES NFR BLD AUTO: 12.6 %
MCH RBC QN AUTO: 29.5 PG (ref 26.5–33)
MCHC RBC AUTO-ENTMCNC: 33.8 G/DL (ref 31.5–36.5)
MCV RBC AUTO: 87 FL (ref 78–100)
MONOCYTES # BLD AUTO: 0.9 10E9/L (ref 0–1.3)
MONOCYTES NFR BLD AUTO: 11.3 %
NEUTROPHILS # BLD AUTO: 5.7 10E9/L (ref 1.6–8.3)
NEUTROPHILS NFR BLD AUTO: 74.3 %
PLATELET # BLD AUTO: 198 10E9/L (ref 150–450)
RBC # BLD AUTO: 4.71 10E12/L (ref 4.4–5.9)
WBC # BLD AUTO: 7.7 10E9/L (ref 4–11)

## 2021-03-18 PROCEDURE — 99214 OFFICE O/P EST MOD 30 MIN: CPT | Performed by: INTERNAL MEDICINE

## 2021-03-18 PROCEDURE — 80048 BASIC METABOLIC PNL TOTAL CA: CPT | Performed by: INTERNAL MEDICINE

## 2021-03-18 PROCEDURE — 83036 HEMOGLOBIN GLYCOSYLATED A1C: CPT | Performed by: INTERNAL MEDICINE

## 2021-03-18 PROCEDURE — 36415 COLL VENOUS BLD VENIPUNCTURE: CPT | Performed by: INTERNAL MEDICINE

## 2021-03-18 PROCEDURE — 85025 COMPLETE CBC W/AUTO DIFF WBC: CPT | Performed by: INTERNAL MEDICINE

## 2021-03-18 ASSESSMENT — MIFFLIN-ST. JEOR: SCORE: 1731.04

## 2021-03-18 NOTE — PROGRESS NOTES
[address] [address]  Phone: [phone]  Primary Provider: Elijah Black  Pre-op Performing Provider: ELIJAH BLACK      PREOPERATIVE EVALUATION:  Today's date: 3/18/2021    Jaguar Tristan is a 80 year old male who presents for a preoperative evaluation.    Surgical Information:  Surgery/Procedure: Cataract right 3/23/21   Left 4/5/21  Surgery Location: [facility]  Surgeon:   Surgery Date: 3/23/21 and 4/5/21  Time of Surgery: TBD  Where patient plans to recover: At home with family  Fax number for surgical facility: [phone]    Type of Anesthesia Anticipated: Local    Assessment & Plan     The proposed surgical procedure is considered LOW risk.    Preop general physical exam  Usual labs will be obtained.  The hemoglobin A1c will be drawn just in regards to coordination of care  - Hemoglobin A1c  - Basic metabolic panel  (Ca, Cl, CO2, Creat, Gluc, K, Na, BUN)  - CBC with platelets and differential    Abnormal finding of blood chemistry, unspecified   As above  - Hemoglobin A1c         Risks and Recommendations:  The patient has the following additional risks and recommendations for perioperative complications:   - No identified additional risk factors other than previously addressed    Medication Instructions:  Patient is to take all scheduled medications on the day of surgery    RECOMMENDATION:  APPROVAL GIVEN to proceed with proposed procedure, without further diagnostic evaluation.    Review of external notes as documented above           Subjective     HPI related to upcoming procedure: Patient with the worsening vision.  He is having glare and halos in the evening.  He has bilateral cataracts.  Is because of his visual changes that he is undergoing cataract surgery.  He denies any other significant symptoms currently.  No sensitivity to anesthesia no bleeding diatheses.      Preop Questions 3/18/2021   1. Have you ever had a heart attack or  stroke? YES -    2. Have you ever had surgery on your heart or blood vessels, such as a stent placement, a coronary artery bypass, or surgery on an artery in your head, neck, heart, or legs? No   3. Do you have chest pain with activity? No   4. Do you have a history of  heart failure? No   5. Do you currently have a cold, bronchitis or symptoms of other infection? No   6. Do you have a cough, shortness of breath, or wheezing? No   7. Do you or anyone in your family have previous history of blood clots? No   8. Do you or does anyone in your family have a serious bleeding problem such as prolonged bleeding following surgeries or cuts? No   9. Have you ever had problems with anemia or been told to take iron pills? No   10. Have you had any abnormal blood loss such as black, tarry or bloody stools? No   11. Have you ever had a blood transfusion? No   12. Are you willing to have a blood transfusion if it is medically needed before, during, or after your surgery? Yes   13. Have you or any of your relatives ever had problems with anesthesia? No   14. Do you have sleep apnea, excessive snoring or daytime drowsiness? No   15. Do you have any artifical heart valves or other implanted medical devices like a pacemaker, defibrillator, or continuous glucose monitor? No   16. Do you have artificial joints? No   17. Are you allergic to latex? No     Health Care Directive:  Patient does not have a Health Care Directive or Living Will: Discussed advance care planning with patient; information given to patient to review.    Preoperative Review of :            Review of Systems  CONSTITUTIONAL: NEGATIVE for fever, chills, change in weight  ENT/MOUTH: NEGATIVE for ear, mouth and throat problems  RESP: NEGATIVE for significant cough or SOB  CV: NEGATIVE for chest pain, palpitations or peripheral edema    Patient Active Problem List    Diagnosis Date Noted     Type 2 diabetes mellitus with other specified complication, without  long-term current use of insulin (H) 03/14/2017     Priority: Medium     History of myocardial infarction 11/30/2016     Priority: Medium     Coronary artery disease involving native coronary artery of native heart without angina pectoris 11/30/2016     Priority: Medium     Cardiac cath 2016: JAYME x2 to RCA        Prostate cancer (H)      Priority: Medium     chemotherapy for metastasis to spine 2016 (cleared), prostatectomy and radiation 2013       Erectile dysfunction      Priority: Medium     Hyperlipidemia LDL goal <100 12/28/2012     Priority: Medium     Benign essential hypertension      Priority: Medium     Rosacea      Priority: Medium     Nephrolithiasis      Priority: Medium      Past Medical History:   Diagnosis Date     Coronary artery disease 02/22/2016    Cardiac cath 2016: JAYME x2 to RCA     Diabetes mellitus (H) 2005     Elevated PSA 2009    bx neg Dr. Dill     Erectile dysfunction      History of colonoscopy 2012    nl     Hypercholesteremia 2002     Hypertension 2002     Nephrolithiasis 2007     Prostate cancer (H) 2013    chemotherapy for metastasis to spine 2016 (cleared), prostatectomy and radiation 2013     Rosacea      Past Surgical History:   Procedure Laterality Date     COLONOSCOPY  1/20/2012    Procedure:COLONOSCOPY; COLONOSCOPY; Surgeon:ELSY GARCIA; Location: GI     CORONARY ANGIOGRAPHY ADULT ORDER       HEART CATH, ANGIOPLASTY  02/22/2016    JAYME x2 to RCA     LEG SURGERY  2005     robotic surgery for prostate cancer  2013    Dr. Dudley     TONSILLECTOMY  5     Current Outpatient Medications   Medication Sig Dispense Refill     Alcohol Swabs (ALCOHOL PADS) 70 % PADS 1 pad 2 times daily For use after pricking finger for checking blood sugar at home. 100 each 11     aspirin 81 MG tablet Take 81 mg by mouth daily        atorvastatin (LIPITOR) 80 MG tablet Take 1 tablet (80 mg) by mouth daily - Establish care appointment with new provider needed for further refills 90 tablet 3      blood glucose (NO BRAND SPECIFIED) lancets standard Use to test blood sugar 2 times daily as directed. 100 each 11     blood glucose monitoring (NO BRAND SPECIFIED) test strip Use to test blood sugars 2 times daily as directed 100 strip 11     Blood Pressure Monitoring KIT Use to test blood sugars 2 times daily as directed 1 kit 0     carvedilol (COREG) 12.5 MG tablet Take 1 tablet (12.5 mg) by mouth 2 times daily 180 tablet 1     dulaglutide (TRULICITY) 0.75 MG/0.5ML pen Inject 0.75 mg Subcutaneous every 7 days 2 mL 1     glimepiride (AMARYL) 4 MG tablet Take 2 tablets (8 mg) by mouth every morning (before breakfast) 180 tablet 0     Glucosamine-Chondroitin (OSTEO BI-FLEX REGULAR STRENGTH PO) Take 1 tablet by mouth as needed        hydrocortisone valerate (WEST-ANA PAULA) 0.2 % ointment Apply sparingly to affected area at face two times daily for no more than 14 days. 15 g 1     Icosapent Ethyl 1 g CAPS Take 2 g by mouth 2 times daily 180 capsule 0     Leuprolide Acetate (LUPRON IJ)        lisinopril-hydrochlorothiazide (ZESTORETIC) 20-12.5 MG tablet Take 1 tablet by mouth daily 90 tablet 3     metFORMIN (GLUCOPHAGE) 1000 MG tablet TAKE 1 TABLET BY MOUTH TWICE A DAY WITH MEALS 180 tablet 3     minocycline (MINOCIN,DYNACIN) 50 MG capsule Take 50 mg by mouth daily as needed Prn for rosacea       Multiple Vitamins-Minerals (PRESERVISION AREDS PO) Take 1 tablet by mouth 2 times daily       nitroglycerin (NITROSTAT) 0.4 MG SL tablet Place 1 tablet (0.4 mg) under the tongue every 5 minutes as needed for chest pain if you are still having symptoms after 3 doses (15 minutes) call 911. 25 tablet 1     triamcinolone (KENALOG) 0.1 % cream Apply sparingly to affected area two times daily for no more than 14 days.  Do not apply to 30 g 2     vitamin B complex with vitamin C (VITAMIN  B COMPLEX) tablet Take 1 tablet by mouth daily         Allergies   Allergen Reactions     Other  [No Clinical Screening - See Comments]      PN: LW  "FI1: NKA LW FI2: nka  PN: LW CM1: Contrast Adverse Reaction - None Reaction :  PN: LW Other1: -NKA        Social History     Tobacco Use     Smoking status: Never Smoker     Smokeless tobacco: Never Used   Substance Use Topics     Alcohol use: Yes     Alcohol/week: 1.0 - 2.0 standard drinks     Types: 1 - 2 Glasses of wine per week     Comment: 3-4 glasses wine per week     Family History   Problem Relation Age of Onset     Cancer Mother      History   Drug Use No         Objective     Pulse 81   Temp 97.6  F (36.4  C) (Temporal)   Ht 1.76 m (5' 9.29\")   Wt 101.6 kg (224 lb)   SpO2 97%   BMI 32.80 kg/m      Physical Exam  GENERAL APPEARANCE: healthy, alert and no distress  HENT: ear canals and TM's normal and nose and mouth without ulcers or lesions  RESP: lungs clear to auscultation - no rales, rhonchi or wheezes  CV: regular rate and rhythm, normal S1 S2, no S3 or S4 and no murmur, click or rub   ABDOMEN: soft, nontender, no HSM or masses and bowel sounds normal  NEURO: Normal strength and tone, sensory exam grossly normal, mentation intact and speech normal    Recent Labs   Lab Test 11/17/20  0917 10/22/20  1452 03/03/20  1132   HGB  --  13.9 13.8   PLT  --  201 189    137 132*   POTASSIUM 4.2 4.4 4.2   CR 0.75 0.80 0.70   A1C  --  5.5 11.6*        Diagnostics:  Labs pending at this time.  Results will be reviewed when available.   No EKG required for low risk surgery (cataract, skin procedure, breast biopsy, etc).    Revised Cardiac Risk Index (RCRI):  The patient has the following serious cardiovascular risks for perioperative complications:   - No serious cardiac risks = 0 points     RCRI Interpretation: 0 points: Class I (very low risk - 0.4% complication rate)           Signed Electronically by: Aquiles Gold MD  Copy of this evaluation report is provided to requesting physician.      "

## 2021-03-18 NOTE — PATIENT INSTRUCTIONS
Mr. Tristan;  I see no contraindication to your upcoming procedure.  Please follow  the recommendations of your surgeon.  There is no need to hold your morning medications the day of surgery.  They can be taken with a small sip of water early in the morning.    Best regards  Aquiles

## 2021-03-19 LAB
ANION GAP SERPL CALCULATED.3IONS-SCNC: 2 MMOL/L (ref 3–14)
BUN SERPL-MCNC: 16 MG/DL (ref 7–30)
CALCIUM SERPL-MCNC: 9.2 MG/DL (ref 8.5–10.1)
CHLORIDE SERPL-SCNC: 102 MMOL/L (ref 94–109)
CO2 SERPL-SCNC: 30 MMOL/L (ref 20–32)
CREAT SERPL-MCNC: 0.83 MG/DL (ref 0.66–1.25)
GFR SERPL CREATININE-BSD FRML MDRD: 84 ML/MIN/{1.73_M2}
GLUCOSE SERPL-MCNC: 321 MG/DL (ref 70–99)
POTASSIUM SERPL-SCNC: 4.1 MMOL/L (ref 3.4–5.3)
SODIUM SERPL-SCNC: 134 MMOL/L (ref 133–144)

## 2021-03-25 DIAGNOSIS — E78.1 HYPERTRIGLYCERIDEMIA: ICD-10-CM

## 2021-03-25 DIAGNOSIS — I25.10 CORONARY ARTERY DISEASE INVOLVING NATIVE CORONARY ARTERY OF NATIVE HEART WITHOUT ANGINA PECTORIS: ICD-10-CM

## 2021-03-25 RX ORDER — ICOSAPENT ETHYL 1 G/1
2 CAPSULE ORAL 2 TIMES DAILY
Qty: 360 CAPSULE | Refills: 3 | Status: SHIPPED | OUTPATIENT
Start: 2021-03-25 | End: 2024-05-20

## 2021-04-10 DIAGNOSIS — E11.69 TYPE 2 DIABETES MELLITUS WITH OTHER SPECIFIED COMPLICATION, WITHOUT LONG-TERM CURRENT USE OF INSULIN (H): ICD-10-CM

## 2021-04-11 DIAGNOSIS — I21.4 NSTEMI (NON-ST ELEVATED MYOCARDIAL INFARCTION) (H): ICD-10-CM

## 2021-04-12 RX ORDER — CARVEDILOL 12.5 MG/1
TABLET ORAL
Qty: 180 TABLET | Refills: 3 | Status: SHIPPED | OUTPATIENT
Start: 2021-04-12 | End: 2022-06-24

## 2021-04-12 RX ORDER — DULAGLUTIDE 0.75 MG/.5ML
0.75 INJECTION, SOLUTION SUBCUTANEOUS
Qty: 2 ML | Refills: 1 | Status: SHIPPED | OUTPATIENT
Start: 2021-04-12 | End: 2021-06-01 | Stop reason: DRUGHIGH

## 2021-04-12 RX ORDER — GLIMEPIRIDE 4 MG/1
8 TABLET ORAL
Qty: 180 TABLET | Refills: 0 | Status: SHIPPED | OUTPATIENT
Start: 2021-04-12 | End: 2021-06-10

## 2021-05-25 ENCOUNTER — OFFICE VISIT (OUTPATIENT)
Dept: FAMILY MEDICINE | Facility: CLINIC | Age: 81
End: 2021-05-25
Payer: MEDICARE

## 2021-05-25 VITALS
OXYGEN SATURATION: 97 % | TEMPERATURE: 98.3 F | HEART RATE: 67 BPM | DIASTOLIC BLOOD PRESSURE: 82 MMHG | SYSTOLIC BLOOD PRESSURE: 141 MMHG | HEIGHT: 69 IN | BODY MASS INDEX: 32.73 KG/M2 | WEIGHT: 221 LBS

## 2021-05-25 DIAGNOSIS — E11.69 TYPE 2 DIABETES MELLITUS WITH OTHER SPECIFIED COMPLICATION, WITHOUT LONG-TERM CURRENT USE OF INSULIN (H): Primary | ICD-10-CM

## 2021-05-25 LAB
ALBUMIN SERPL-MCNC: 3.7 G/DL (ref 3.4–5)
ALP SERPL-CCNC: 125 U/L (ref 40–150)
ALT SERPL W P-5'-P-CCNC: 22 U/L (ref 0–70)
ANION GAP SERPL CALCULATED.3IONS-SCNC: 6 MMOL/L (ref 3–14)
AST SERPL W P-5'-P-CCNC: 9 U/L (ref 0–45)
BASOPHILS # BLD AUTO: 0.1 10E9/L (ref 0–0.2)
BASOPHILS NFR BLD AUTO: 0.9 %
BILIRUB SERPL-MCNC: 0.5 MG/DL (ref 0.2–1.3)
BUN SERPL-MCNC: 16 MG/DL (ref 7–30)
CALCIUM SERPL-MCNC: 9.5 MG/DL (ref 8.5–10.1)
CHLORIDE SERPL-SCNC: 103 MMOL/L (ref 94–109)
CO2 SERPL-SCNC: 29 MMOL/L (ref 20–32)
CREAT SERPL-MCNC: 0.86 MG/DL (ref 0.66–1.25)
DIFFERENTIAL METHOD BLD: NORMAL
EOSINOPHIL # BLD AUTO: 0.1 10E9/L (ref 0–0.7)
EOSINOPHIL NFR BLD AUTO: 0.9 %
ERYTHROCYTE [DISTWIDTH] IN BLOOD BY AUTOMATED COUNT: 13.7 % (ref 10–15)
GFR SERPL CREATININE-BSD FRML MDRD: 83 ML/MIN/{1.73_M2}
GLUCOSE SERPL-MCNC: 169 MG/DL (ref 70–99)
HBA1C MFR BLD: 8.9 % (ref 0–5.6)
HCT VFR BLD AUTO: 40.3 % (ref 40–53)
HGB BLD-MCNC: 13.9 G/DL (ref 13.3–17.7)
LYMPHOCYTES # BLD AUTO: 1.1 10E9/L (ref 0.8–5.3)
LYMPHOCYTES NFR BLD AUTO: 13.9 %
MCH RBC QN AUTO: 29.8 PG (ref 26.5–33)
MCHC RBC AUTO-ENTMCNC: 34.5 G/DL (ref 31.5–36.5)
MCV RBC AUTO: 86 FL (ref 78–100)
MONOCYTES # BLD AUTO: 0.8 10E9/L (ref 0–1.3)
MONOCYTES NFR BLD AUTO: 10.5 %
NEUTROPHILS # BLD AUTO: 5.7 10E9/L (ref 1.6–8.3)
NEUTROPHILS NFR BLD AUTO: 73.8 %
PLATELET # BLD AUTO: 227 10E9/L (ref 150–450)
POTASSIUM SERPL-SCNC: 3.9 MMOL/L (ref 3.4–5.3)
PROT SERPL-MCNC: 7.2 G/DL (ref 6.8–8.8)
RBC # BLD AUTO: 4.67 10E12/L (ref 4.4–5.9)
SODIUM SERPL-SCNC: 138 MMOL/L (ref 133–144)
TSH SERPL DL<=0.005 MIU/L-ACNC: 1.05 MU/L (ref 0.4–4)
WBC # BLD AUTO: 7.7 10E9/L (ref 4–11)

## 2021-05-25 PROCEDURE — 85025 COMPLETE CBC W/AUTO DIFF WBC: CPT | Performed by: INTERNAL MEDICINE

## 2021-05-25 PROCEDURE — 99214 OFFICE O/P EST MOD 30 MIN: CPT | Performed by: INTERNAL MEDICINE

## 2021-05-25 PROCEDURE — 80053 COMPREHEN METABOLIC PANEL: CPT | Performed by: INTERNAL MEDICINE

## 2021-05-25 PROCEDURE — 36415 COLL VENOUS BLD VENIPUNCTURE: CPT | Performed by: INTERNAL MEDICINE

## 2021-05-25 PROCEDURE — 83036 HEMOGLOBIN GLYCOSYLATED A1C: CPT | Performed by: INTERNAL MEDICINE

## 2021-05-25 PROCEDURE — 84443 ASSAY THYROID STIM HORMONE: CPT | Performed by: INTERNAL MEDICINE

## 2021-05-25 RX ORDER — METFORMIN HCL 500 MG
1000 TABLET, EXTENDED RELEASE 24 HR ORAL 2 TIMES DAILY WITH MEALS
Qty: 360 TABLET | Refills: 3 | Status: SHIPPED | OUTPATIENT
Start: 2021-05-25 | End: 2022-06-29

## 2021-05-25 ASSESSMENT — MIFFLIN-ST. JEOR: SCORE: 1712.83

## 2021-05-25 NOTE — PATIENT INSTRUCTIONS
Mr. Tristan;  I would like to change your medications around a bit.  I would recommend that you discontinue your current Metformin.  I would like to try to utilize extended release which should relieve some of your abdominal pain.    I would like to reassess the hemoglobin A1c today.  If your hemoglobin A1c is elevated I would recommend increasing your Trulicity.    I would like to see you back in 6 months.    Best regards  Aquiles

## 2021-05-25 NOTE — PROGRESS NOTES
"    Assessment & Plan     Type 2 diabetes mellitus with other specified complication, without long-term current use of insulin (H)  History of poor control related to medication compliance and dietary compliance.  Patient also states that he refrains from taking Metformin at times because of abdominal discomfort.  - metFORMIN (GLUCOPHAGE-XR) 500 MG 24 hr tablet; Take 2 tablets (1,000 mg) by mouth 2 times daily (with meals)  - Hemoglobin A1c  - TSH with free T4 reflex  - Comprehensive metabolic panel (BMP + Alb, Alk Phos, ALT, AST, Total. Bili, TP)  - CBC with platelets and differential       BMI:   Estimated body mass index is 32.64 kg/m  as calculated from the following:    Height as of this encounter: 1.753 m (5' 9\").    Weight as of this encounter: 100.2 kg (221 lb).       See Patient Instructions    Return for Follow up.    Aquiles Gold MD  [facility] SHIVAM Montesinos is a 80 year old who presents for the following health issues     HPI 80-year-old male presents clinic today for follow-up.  He has a history of diabetes mellitus.  Historically has had highly variable control.  Some of the variability relates to medication compliance.  Some of the medication compliance issues related to side effects of Metformin.  States he does get abdominal distention when he takes on an empty stomach.  States that if he needs to go into work late this can be a problem.    Blood pressures are well controlled with 120s and 130s typically obtained.  Today's blood pressure was slightly elevated which he states is highly atypical.    He is up-to-date with his eye examinations.  No numbness of the lower extremities no skin breakdown.  F/up diabetes, hypertension          Review of Systems   Constitutional, HEENT, cardiovascular, pulmonary, gi and gu systems are negative, except as otherwise noted.      Objective    There were no vitals taken for this visit.  There is no height or weight on file to " calculate BMI.  Physical Exam   Alert patient in no apparent distress  Heart regular rate and rhythm no carotid bruit  Lungs are clear  Extremities no edema sensorium intact    Office Visit on 03/18/2021   Component Date Value Ref Range Status     Hemoglobin A1C 03/18/2021 9.6* 0 - 5.6 % Final    Comment: Reviewed: OK with previous  Normal <5.7% Prediabetes 5.7-6.4%  Diabetes 6.5% or higher - adopted from ADA   consensus guidelines.       Sodium 03/18/2021 134  133 - 144 mmol/L Final     Potassium 03/18/2021 4.1  3.4 - 5.3 mmol/L Final     Chloride 03/18/2021 102  94 - 109 mmol/L Final     Carbon Dioxide 03/18/2021 30  20 - 32 mmol/L Final     Anion Gap 03/18/2021 2* 3 - 14 mmol/L Final     Glucose 03/18/2021 321* 70 - 99 mg/dL Final     Urea Nitrogen 03/18/2021 16  7 - 30 mg/dL Final     Creatinine 03/18/2021 0.83  0.66 - 1.25 mg/dL Final     GFR Estimate 03/18/2021 84  >60 mL/min/[1.73_m2] Final    Comment: Non  GFR Calc  Starting 12/18/2018, serum creatinine based estimated GFR (eGFR) will be   calculated using the Chronic Kidney Disease Epidemiology Collaboration   (CKD-EPI) equation.       GFR Estimate If Black 03/18/2021 >90  >60 mL/min/[1.73_m2] Final    Comment:  GFR Calc  Starting 12/18/2018, serum creatinine based estimated GFR (eGFR) will be   calculated using the Chronic Kidney Disease Epidemiology Collaboration   (CKD-EPI) equation.       Calcium 03/18/2021 9.2  8.5 - 10.1 mg/dL Final     WBC 03/18/2021 7.7  4.0 - 11.0 10e9/L Final     RBC Count 03/18/2021 4.71  4.4 - 5.9 10e12/L Final     Hemoglobin 03/18/2021 13.9  13.3 - 17.7 g/dL Final     Hematocrit 03/18/2021 41.1  40.0 - 53.0 % Final     MCV 03/18/2021 87  78 - 100 fl Final     MCH 03/18/2021 29.5  26.5 - 33.0 pg Final     MCHC 03/18/2021 33.8  31.5 - 36.5 g/dL Final     RDW 03/18/2021 13.6  10.0 - 15.0 % Final     Platelet Count 03/18/2021 198  150 - 450 10e9/L Final     % Neutrophils 03/18/2021 74.3  % Final      % Lymphocytes 03/18/2021 12.6  % Final     % Monocytes 03/18/2021 11.3  % Final     % Eosinophils 03/18/2021 0.9  % Final     % Basophils 03/18/2021 0.9  % Final     Absolute Neutrophil 03/18/2021 5.7  1.6 - 8.3 10e9/L Final     Absolute Lymphocytes 03/18/2021 1.0  0.8 - 5.3 10e9/L Final     Absolute Monocytes 03/18/2021 0.9  0.0 - 1.3 10e9/L Final     Absolute Eosinophils 03/18/2021 0.1  0.0 - 0.7 10e9/L Final     Absolute Basophils 03/18/2021 0.1  0.0 - 0.2 10e9/L Final     Diff Method 03/18/2021 Automated Method   Final

## 2021-06-01 ENCOUNTER — TELEPHONE (OUTPATIENT)
Dept: FAMILY MEDICINE | Facility: CLINIC | Age: 81
End: 2021-06-01

## 2021-06-01 DIAGNOSIS — E11.69 TYPE 2 DIABETES MELLITUS WITH OTHER SPECIFIED COMPLICATION, WITHOUT LONG-TERM CURRENT USE OF INSULIN (H): ICD-10-CM

## 2021-06-01 RX ORDER — DULAGLUTIDE 1.5 MG/.5ML
1.5 INJECTION, SOLUTION SUBCUTANEOUS
Qty: 6 ML | Refills: 3 | COMMUNITY
Start: 2021-06-01 | End: 2021-06-10

## 2021-06-01 RX ORDER — DULAGLUTIDE 1.5 MG/.5ML
1.5 INJECTION, SOLUTION SUBCUTANEOUS
Qty: 6 ML | Refills: 3 | COMMUNITY
Start: 2021-06-01 | End: 2021-06-01

## 2021-06-10 DIAGNOSIS — E11.69 TYPE 2 DIABETES MELLITUS WITH OTHER SPECIFIED COMPLICATION, WITHOUT LONG-TERM CURRENT USE OF INSULIN (H): Primary | ICD-10-CM

## 2021-06-10 DIAGNOSIS — E11.69 TYPE 2 DIABETES MELLITUS WITH OTHER SPECIFIED COMPLICATION, WITHOUT LONG-TERM CURRENT USE OF INSULIN (H): ICD-10-CM

## 2021-06-10 RX ORDER — DULAGLUTIDE 1.5 MG/.5ML
1.5 INJECTION, SOLUTION SUBCUTANEOUS
Qty: 6 ML | Refills: 3 | Status: SHIPPED | OUTPATIENT
Start: 2021-06-10 | End: 2022-05-17

## 2021-06-10 RX ORDER — GLIMEPIRIDE 4 MG/1
8 TABLET ORAL
Qty: 180 TABLET | Refills: 1 | Status: SHIPPED | OUTPATIENT
Start: 2021-06-10 | End: 2022-01-19

## 2021-06-10 NOTE — TELEPHONE ENCOUNTER
Patient called stating they haven't recived their new script for the increased dose of trulicity. Looks like medication was ordered on 6/1/21, but was ordered as historical instead of [facility]. Pended medication as escribe, to patients preferred pharmacy. Patient is going out of town on sunday and would like to have this script sent over at the latest on Saturday.     dulaglutide (TRULICITY) 1.5 MG/0.5ML pen 6 mL 3 6/1/2021  --   Sig - Route: Inject 1.5 mg Subcutaneous every 7 days - Subcutaneous   Class: Historical   Order: [id number]     Arik Ortez RN  [facility]

## 2021-11-01 DIAGNOSIS — I10 ESSENTIAL HYPERTENSION: ICD-10-CM

## 2021-11-02 RX ORDER — LISINOPRIL AND HYDROCHLOROTHIAZIDE 12.5; 2 MG/1; MG/1
TABLET ORAL
Qty: 90 TABLET | Refills: 0 | Status: SHIPPED | OUTPATIENT
Start: 2021-11-02 | End: 2022-06-28

## 2021-11-02 NOTE — TELEPHONE ENCOUNTER
Next 5 appointments (look out 90 days)    Nov 26, 2021  1:00 PM  Office Visit with Aquiles Gold MD  [facility] ([facility] ) [address], [address]  [facility] [address] [phone]        Medication filled 1 time as last BP high. Patient is already scheduled for upcoming appointment.    Eboni Rosario RN  [facility]

## 2021-11-22 DIAGNOSIS — E78.5 HYPERLIPIDEMIA LDL GOAL <100: ICD-10-CM

## 2021-11-22 RX ORDER — ATORVASTATIN CALCIUM 80 MG/1
80 TABLET, FILM COATED ORAL DAILY
Qty: 90 TABLET | Refills: 1 | Status: SHIPPED | OUTPATIENT
Start: 2021-11-22 | End: 2022-10-03

## 2022-01-19 DIAGNOSIS — E11.69 TYPE 2 DIABETES MELLITUS WITH OTHER SPECIFIED COMPLICATION, WITHOUT LONG-TERM CURRENT USE OF INSULIN (H): ICD-10-CM

## 2022-01-19 RX ORDER — GLIMEPIRIDE 4 MG/1
8 TABLET ORAL
Qty: 180 TABLET | Refills: 1 | Status: SHIPPED | OUTPATIENT
Start: 2022-01-19 | End: 2022-10-19

## 2022-01-19 NOTE — TELEPHONE ENCOUNTER
Routing refill request to provider for review/approval because:  Labs out of range:    Hemoglobin A1C POCT   Date Value Ref Range Status   05/25/2021 8.9 (H) 0 - 5.6 % Final     Comment:     Reviewed: OK with previous  Normal <5.7% Prediabetes 5.7-6.4%  Diabetes 6.5% or higher - adopted from ADA   consensus guidelines.       Has not been seen over 6 months  Rosi CASTRO RN  [facility]

## 2022-03-03 DIAGNOSIS — E78.5 HYPERLIPIDEMIA LDL GOAL <100: Primary | ICD-10-CM

## 2022-03-03 DIAGNOSIS — E78.1 HYPERTRIGLYCERIDEMIA: ICD-10-CM

## 2022-03-03 DIAGNOSIS — E78.2 MIXED HYPERLIPIDEMIA: ICD-10-CM

## 2022-03-03 DIAGNOSIS — I25.10 CORONARY ARTERY DISEASE INVOLVING NATIVE CORONARY ARTERY OF NATIVE HEART WITHOUT ANGINA PECTORIS: ICD-10-CM

## 2022-03-03 DIAGNOSIS — I10 ESSENTIAL HYPERTENSION: ICD-10-CM

## 2022-03-07 ENCOUNTER — LAB (OUTPATIENT)
Dept: LAB | Facility: CLINIC | Age: 82
End: 2022-03-07
Payer: MEDICARE

## 2022-03-07 DIAGNOSIS — I25.10 CORONARY ARTERY DISEASE INVOLVING NATIVE CORONARY ARTERY OF NATIVE HEART WITHOUT ANGINA PECTORIS: ICD-10-CM

## 2022-03-07 DIAGNOSIS — E78.5 HYPERLIPIDEMIA LDL GOAL <100: ICD-10-CM

## 2022-03-07 DIAGNOSIS — E78.2 MIXED HYPERLIPIDEMIA: ICD-10-CM

## 2022-03-07 DIAGNOSIS — E78.1 HYPERTRIGLYCERIDEMIA: ICD-10-CM

## 2022-03-07 DIAGNOSIS — I10 ESSENTIAL HYPERTENSION: ICD-10-CM

## 2022-03-07 LAB
ALT SERPL W P-5'-P-CCNC: 23 U/L (ref 0–70)
ANION GAP SERPL CALCULATED.3IONS-SCNC: 6 MMOL/L (ref 3–14)
BUN SERPL-MCNC: 24 MG/DL (ref 7–30)
CALCIUM SERPL-MCNC: 9.4 MG/DL (ref 8.5–10.1)
CHLORIDE BLD-SCNC: 103 MMOL/L (ref 94–109)
CHOLEST SERPL-MCNC: 124 MG/DL
CO2 SERPL-SCNC: 24 MMOL/L (ref 20–32)
CREAT SERPL-MCNC: 0.86 MG/DL (ref 0.66–1.25)
FASTING STATUS PATIENT QL REPORTED: YES
GFR SERPL CREATININE-BSD FRML MDRD: 88 ML/MIN/1.73M2
GLUCOSE BLD-MCNC: 309 MG/DL (ref 70–99)
HDLC SERPL-MCNC: 41 MG/DL
LDLC SERPL CALC-MCNC: 40 MG/DL
NONHDLC SERPL-MCNC: 83 MG/DL
POTASSIUM BLD-SCNC: 4.3 MMOL/L (ref 3.4–5.3)
SODIUM SERPL-SCNC: 133 MMOL/L (ref 133–144)
TRIGL SERPL-MCNC: 214 MG/DL

## 2022-03-07 PROCEDURE — 80061 LIPID PANEL: CPT | Performed by: INTERNAL MEDICINE

## 2022-03-07 PROCEDURE — 80048 BASIC METABOLIC PNL TOTAL CA: CPT | Performed by: INTERNAL MEDICINE

## 2022-03-07 PROCEDURE — 36415 COLL VENOUS BLD VENIPUNCTURE: CPT | Performed by: INTERNAL MEDICINE

## 2022-03-07 PROCEDURE — 84460 ALANINE AMINO (ALT) (SGPT): CPT | Performed by: INTERNAL MEDICINE

## 2022-03-08 ENCOUNTER — OFFICE VISIT (OUTPATIENT)
Dept: CARDIOLOGY | Facility: CLINIC | Age: 82
End: 2022-03-08
Payer: MEDICARE

## 2022-03-08 VITALS
WEIGHT: 216.7 LBS | HEIGHT: 69 IN | OXYGEN SATURATION: 100 % | SYSTOLIC BLOOD PRESSURE: 116 MMHG | HEART RATE: 70 BPM | BODY MASS INDEX: 32.09 KG/M2 | DIASTOLIC BLOOD PRESSURE: 68 MMHG

## 2022-03-08 DIAGNOSIS — E78.5 HYPERLIPIDEMIA LDL GOAL <100: Primary | ICD-10-CM

## 2022-03-08 DIAGNOSIS — I10 ESSENTIAL HYPERTENSION: ICD-10-CM

## 2022-03-08 DIAGNOSIS — E78.1 HYPERTRIGLYCERIDEMIA: ICD-10-CM

## 2022-03-08 DIAGNOSIS — I25.10 CORONARY ARTERY DISEASE INVOLVING NATIVE CORONARY ARTERY OF NATIVE HEART WITHOUT ANGINA PECTORIS: ICD-10-CM

## 2022-03-08 PROCEDURE — 99214 OFFICE O/P EST MOD 30 MIN: CPT | Performed by: INTERNAL MEDICINE

## 2022-03-08 NOTE — LETTER
3/8/2022    Aquiles Gold MD  [address]  [place] [address]    RE: Jaguar Tristan       Dear Colleague,     I had the pleasure of seeing Jaguar [facility]istan [facility] the [facility].  HPI and Plan:   See dictation          Orders Placed This Encounter   Procedures     Basic metabolic panel     Lipid Profile     ALT     Follow-Up with Cardiology       No orders of the defined types were placed in this encounter.      There are no discontinued medications.      Encounter Diagnoses   Name Primary?     Hyperlipidemia LDL goal <100 Yes     Coronary artery disease involving native coronary artery of native heart without angina pectoris      Essential hypertension      Hypertriglyceridemia        CURRENT MEDICATIONS:  Current Outpatient Medications   Medication Sig Dispense Refill     Alcohol Swabs (ALCOHOL PADS) 70 % PADS 1 pad 2 times daily For use after pricking finger for checking blood sugar at home. 100 each 11     atorvastatin (LIPITOR) 80 MG tablet Take 1 tablet (80 mg) by mouth daily 90 tablet 1     blood glucose (NO BRAND SPECIFIED) lancets standard Use to test blood sugar 2 times daily as directed. 100 each 11     blood glucose monitoring (NO BRAND SPECIFIED) test strip Use to test blood sugars 2 times daily as directed 100 strip 11     Blood Pressure Monitoring KIT Use to test blood sugars 2 times daily as directed 1 kit 0     carvedilol (COREG) 12.5 MG tablet TAKE 1 TABLET BY MOUTH TWICE A  tablet 3     dulaglutide (TRULICITY) 1.5 MG/0.5ML pen Inject 1.5 mg Subcutaneous every 7 days 6 mL 3     glimepiride (AMARYL) 4 MG tablet TAKE 2 TABLETS (8 MG) BY MOUTH EVERY MORNING (BEFORE BREAKFAST) 180 tablet 1     Glucosamine-Chondroitin (OSTEO BI-FLEX REGULAR STRENGTH PO) Take 1 tablet by mouth as needed        Icosapent Ethyl 1 g CAPS Take 2 g by mouth 2 times daily 360 capsule 3     lisinopril-hydrochlorothiazide (ZESTORETIC) 20-12.5 MG tablet TAKE 1 TABLET BY MOUTH EVERY DAY 90 tablet 0      metFORMIN (GLUCOPHAGE-XR) 500 MG 24 hr tablet Take 2 tablets (1,000 mg) by mouth 2 times daily (with meals) 360 tablet 3     Multiple Vitamins-Minerals (PRESERVISION AREDS PO) Take 1 tablet by mouth 2 times daily       nitroglycerin (NITROSTAT) 0.4 MG SL tablet Place 1 tablet (0.4 mg) under the tongue every 5 minutes as needed for chest pain if you are still having symptoms after 3 doses (15 minutes) call 911. 25 tablet 1     triamcinolone (KENALOG) 0.1 % cream Apply sparingly to affected area two times daily for no more than 14 days.  Do not apply to 30 g 2     vitamin B complex with vitamin C (VITAMIN  B COMPLEX) tablet Take 1 tablet by mouth daily       aspirin 81 MG tablet Take 81 mg by mouth daily  (Patient not taking: Reported on 3/8/2022)       hydrocortisone valerate (WEST-ANA PAULA) 0.2 % ointment Apply sparingly to affected area at face two times daily for no more than 14 days. (Patient not taking: Reported on 3/8/2022) 15 g 1     Leuprolide Acetate (LUPRON IJ)  (Patient not taking: Reported on 3/8/2022)       minocycline (MINOCIN,DYNACIN) 50 MG capsule Take 50 mg by mouth daily as needed Prn for rosacea (Patient not taking: Reported on 3/8/2022)         ALLERGIES     Allergies   Allergen Reactions     Other  [No Clinical Screening - See Comments]      PN: LW FI1: NKA LW FI2: nka  PN: LW CM1: Contrast Adverse Reaction - None Reaction :  PN: LW Other1: -NKA       PAST MEDICAL HISTORY:  Past Medical History:   Diagnosis Date     Coronary artery disease 02/22/2016    Cardiac cath 2016: JAYME x2 to RCA     Diabetes mellitus (H) 2005     Elevated PSA 2009    bx neg Dr. Dill     Erectile dysfunction      History of colonoscopy 2012    nl     Hypercholesteremia 2002     Hypertension 2002     Nephrolithiasis 2007     Prostate cancer (H) 2013    chemotherapy for metastasis to spine 2016 (cleared), prostatectomy and radiation 2013     Rosacea        PAST SURGICAL HISTORY:  Past Surgical History:   Procedure  Laterality Date     COLONOSCOPY  1/20/2012    Procedure:COLONOSCOPY; COLONOSCOPY; Surgeon:ELSY GARCIA; Location: GI     CORONARY ANGIOGRAPHY ADULT ORDER       HEART CATH, ANGIOPLASTY  02/22/2016    JAYME x2 to RCA     LEG SURGERY  2005     robotic surgery for prostate cancer  2013    Dr. Dudley     TONSILLECTOMY  7       FAMILY HISTORY:  Family History   Problem Relation Age of Onset     Cancer Mother        SOCIAL HISTORY:  Social History     Socioeconomic History     Marital status:      Spouse name: Not on file     Number of children: Not on file     Years of education: Not on file     Highest education level: Not on file   Occupational History     Occupation: [employer]   Tobacco Use     Smoking status: Never Smoker     Smokeless tobacco: Never Used   Substance and Sexual Activity     Alcohol use: Yes     Alcohol/week: 1.0 - 2.0 standard drink     Types: 1 - 2 Glasses of wine per week     Comment: 3-4 glasses wine per week     Drug use: No     Sexual activity: Yes     Partners: Female   Other Topics Concern     Parent/sibling w/ CABG, MI or angioplasty before 65F 55M? Not Asked      Service Not Asked     Blood Transfusions Not Asked     Caffeine Concern No     Comment: no caffeine     Occupational Exposure Not Asked     Hobby Hazards Not Asked     Sleep Concern No     Stress Concern No     Weight Concern No     Special Diet Yes     Comment: cut out pop and sodium     Back Care Not Asked     Exercise Yes     Comment: walking a lot, cardio rehab     Bike Helmet Not Asked     Seat Belt Not Asked     Self-Exams Not Asked   Social History Narrative    so I said we could give him an Accu-Chek Simi from the clinic (I may drop it off with you on 3/2/16).           Social Determinants of Health     Financial Resource Strain: Not on file   Food Insecurity: Not on file   Transportation Needs: Not on file   Physical Activity: Not on file   Stress: Not on file   Social Connections: Not on file  "  Intimate Partner Violence: Not on file   Housing Stability: Not on file       Review of Systems:  Skin:  Negative       Eyes:  Positive for glasses    ENT:  Negative epistaxis (got a bloody nose after having red wine)    Respiratory:  Positive for dyspnea on exertion with steps   Cardiovascular:  Negative;palpitations;chest pain;fatigue;lightheadedness;dizziness      Gastroenterology: Negative      Genitourinary:  not assessed      Musculoskeletal:  Positive for arthritis    Neurologic:  Negative      Psychiatric:  Negative      Heme/Lymph/Imm:  Negative      Endocrine:  Positive for diabetes      Physical Exam:  Vitals: /68   Pulse 70   Ht 1.76 m (5' 9.29\")   Wt 98.3 kg (216 lb 11.2 oz)   SpO2 100%   BMI 31.73 kg/m      Constitutional:  cooperative, alert and oriented, well developed, well nourished, in no acute distress overweight      Skin:  warm and dry to the touch, no apparent skin lesions or masses noted          Head:  normocephalic, no masses or lesions        Eyes:  pupils equal and round, conjunctivae and lids unremarkable, sclera white, no xanthalasma, EOMS intact, no nystagmus        Lymph:      ENT:  no pallor or cyanosis, dentition good        Neck:           Respiratory:  normal breath sounds, clear to auscultation, normal A-P diameter, normal symmetry, normal respiratory excursion, no use of accessory muscles         Cardiac: regular rhythm;normal S1 and S2;apical impulse not displaced   S4 no presence of murmur          pulses full and equal, no bruits auscultated                                        GI:  not assessed this visit        Extremities and Muscular Skeletal:  no deformities, clubbing, cyanosis, erythema observed;no edema         right radial site clean without hum or bruit    Neurological:  no gross motor deficits;affect appropriate        Psych:  Alert and Oriented x 3        CC  No referring provider defined for this encounter.    Thank you for allowing me to " participate in the care of your patient.      Sincerely,     Lg Mancilla MD, MD     [facility]   Statement Selected

## 2022-03-08 NOTE — PROGRESS NOTES
Service Date: 03/08/2022    CARDIOLOGY FOLLOWUP VISIT    REFERRING PHYSICIAN:  Dr. Aquiles Gold    HISTORY OF PRESENT ILLNESS:  It is my pleasure to see your patient, Jaguar Tristan, in followup.  Jaguar Tristan is an extremely pleasant patient who I always enjoy meeting.  As you know, this patient suffered a non-ST elevation myocardial infarct and had 2 stents placed in the distal right coronary artery in 02/2016.  His major risk factors for coronary artery disease are diabetes mellitus, essential hypertension and hyperlipidemia which is mixed with hypertriglyceridemia.  Since I last saw him, he has done well.  He has no chest pains, no chest pressure and no unusual shortness of breath.  As you know, he was diagnosed with metastatic prostate cancer but it appears that he has recently got the all clear on his scans and serology.  He follows at the [facility] for his prostate cancer.  If you remember, we did add in Vascepa 2 grams twice a day for hypertriglyceridemia, based upon the REDUCE-IT trial; however, he developed significant diarrhea with this.  He had to stop taking it.  His lipid profile yesterday was reasonably good.  The LDL was excellent at 40 and his HDL was normal at 41.  His triglycerides were mildly raised at 214 and his total cholesterol is 124.  Liver function tests were normal at 23.  His blood pressure was initially raised when he came in but he did walk over from the car park.  When I rechecked his blood pressure, it was entirely normal at 116/68.    IMPRESSION:    1.  Coronary artery disease and prior non-Q-wave myocardial infarct and stenting of the distal right coronary artery.  The patient is asymptomatic with respect to coronary artery disease with no symptoms suggestive of angina pectoris.  2.  Mixed hyperlipidemia.  Could not tolerate Vascepa because of diarrhea.  Triglycerides were mildly raised but the other components of his lipid profile are excellent.  3.  Essential hypertension.  His  blood pressure is well controlled.  4.  Metastatic prostate cancer.  At present, he seems to have all clear from radiological and serological studies.    PLAN:  We will continue the patient on his present medications.  I have encouraged him to exercise and to reduce his carbohydrate intake and to lose weight which should help his triglycerides and improve his HDL.  I will see him back again in 1 year's time and at that stage, I will repeat his lipids and basic metabolic profile.  I wish him the best with his prostate cancer.  I look forward to seeing him again in 1 year's time but as always, he has been told to contact me if he has any questions or any concerns.    Lg Jessica MD, FACC     cc:  [place] [address]    Lg Jessica MD, FACC        D: 2022   T: 2022   MT: jericho    Name:     ESTEFANIA HERNANDEZ  MRN:      [id number]        Account:      [id number]   :      1940           Service Date: 2022       Document: [id number]

## 2022-03-08 NOTE — PROGRESS NOTES
HPI and Plan:   See dictation          Orders Placed This Encounter   Procedures     Basic metabolic panel     Lipid Profile     ALT     Follow-Up with Cardiology       No orders of the defined types were placed in this encounter.      There are no discontinued medications.      Encounter Diagnoses   Name Primary?     Hyperlipidemia LDL goal <100 Yes     Coronary artery disease involving native coronary artery of native heart without angina pectoris      Essential hypertension      Hypertriglyceridemia        CURRENT MEDICATIONS:  Current Outpatient Medications   Medication Sig Dispense Refill     Alcohol Swabs (ALCOHOL PADS) 70 % PADS 1 pad 2 times daily For use after pricking finger for checking blood sugar at home. 100 each 11     atorvastatin (LIPITOR) 80 MG tablet Take 1 tablet (80 mg) by mouth daily 90 tablet 1     blood glucose (NO BRAND SPECIFIED) lancets standard Use to test blood sugar 2 times daily as directed. 100 each 11     blood glucose monitoring (NO BRAND SPECIFIED) test strip Use to test blood sugars 2 times daily as directed 100 strip 11     Blood Pressure Monitoring KIT Use to test blood sugars 2 times daily as directed 1 kit 0     carvedilol (COREG) 12.5 MG tablet TAKE 1 TABLET BY MOUTH TWICE A  tablet 3     dulaglutide (TRULICITY) 1.5 MG/0.5ML pen Inject 1.5 mg Subcutaneous every 7 days 6 mL 3     glimepiride (AMARYL) 4 MG tablet TAKE 2 TABLETS (8 MG) BY MOUTH EVERY MORNING (BEFORE BREAKFAST) 180 tablet 1     Glucosamine-Chondroitin (OSTEO BI-FLEX REGULAR STRENGTH PO) Take 1 tablet by mouth as needed        Icosapent Ethyl 1 g CAPS Take 2 g by mouth 2 times daily 360 capsule 3     lisinopril-hydrochlorothiazide (ZESTORETIC) 20-12.5 MG tablet TAKE 1 TABLET BY MOUTH EVERY DAY 90 tablet 0     metFORMIN (GLUCOPHAGE-XR) 500 MG 24 hr tablet Take 2 tablets (1,000 mg) by mouth 2 times daily (with meals) 360 tablet 3     Multiple Vitamins-Minerals (PRESERVISION AREDS PO) Take 1 tablet by mouth 2  times daily       nitroglycerin (NITROSTAT) 0.4 MG SL tablet Place 1 tablet (0.4 mg) under the tongue every 5 minutes as needed for chest pain if you are still having symptoms after 3 doses (15 minutes) call 911. 25 tablet 1     triamcinolone (KENALOG) 0.1 % cream Apply sparingly to affected area two times daily for no more than 14 days.  Do not apply to 30 g 2     vitamin B complex with vitamin C (VITAMIN  B COMPLEX) tablet Take 1 tablet by mouth daily       aspirin 81 MG tablet Take 81 mg by mouth daily  (Patient not taking: Reported on 3/8/2022)       hydrocortisone valerate (WEST-ANA PAULA) 0.2 % ointment Apply sparingly to affected area at face two times daily for no more than 14 days. (Patient not taking: Reported on 3/8/2022) 15 g 1     Leuprolide Acetate (LUPRON IJ)  (Patient not taking: Reported on 3/8/2022)       minocycline (MINOCIN,DYNACIN) 50 MG capsule Take 50 mg by mouth daily as needed Prn for rosacea (Patient not taking: Reported on 3/8/2022)         ALLERGIES     Allergies   Allergen Reactions     Other  [No Clinical Screening - See Comments]      PN: LW FI1: NKA LW FI2: nka  PN: LW CM1: Contrast Adverse Reaction - None Reaction :  PN: LW Other1: -NKA       PAST MEDICAL HISTORY:  Past Medical History:   Diagnosis Date     Coronary artery disease 02/22/2016    Cardiac cath 2016: JAYME x2 to RCA     Diabetes mellitus (H) 2005     Elevated PSA 2009    bx neg Dr. Dill     Erectile dysfunction      History of colonoscopy 2012    nl     Hypercholesteremia 2002     Hypertension 2002     Nephrolithiasis 2007     Prostate cancer (H) 2013    chemotherapy for metastasis to spine 2016 (cleared), prostatectomy and radiation 2013     Rosacea        PAST SURGICAL HISTORY:  Past Surgical History:   Procedure Laterality Date     COLONOSCOPY  1/20/2012    Procedure:COLONOSCOPY; COLONOSCOPY; Surgeon:ELSY GARCIA; Location: GI     CORONARY ANGIOGRAPHY ADULT ORDER       HEART CATH, ANGIOPLASTY  02/22/2016    JAYME x2 to  RCA     LEG SURGERY  2005     robotic surgery for prostate cancer  2013    Dr. Dudley     TONSILLECTOMY  7       FAMILY HISTORY:  Family History   Problem Relation Age of Onset     Cancer Mother        SOCIAL HISTORY:  Social History     Socioeconomic History     Marital status:      Spouse name: Not on file     Number of children: Not on file     Years of education: Not on file     Highest education level: Not on file   Occupational History     Occupation: [employer]    Tobacco Use     Smoking status: Never Smoker     Smokeless tobacco: Never Used   Substance and Sexual Activity     Alcohol use: Yes     Alcohol/week: 1.0 - 2.0 standard drink     Types: 1 - 2 Glasses of wine per week     Comment: 3-4 glasses wine per week     Drug use: No     Sexual activity: Yes     Partners: Female   Other Topics Concern     Parent/sibling w/ CABG, MI or angioplasty before 65F 55M? Not Asked      Service Not Asked     Blood Transfusions Not Asked     Caffeine Concern No     Comment: no caffeine     Occupational Exposure Not Asked     Hobby Hazards Not Asked     Sleep Concern No     Stress Concern No     Weight Concern No     Special Diet Yes     Comment: cut out pop and sodium     Back Care Not Asked     Exercise Yes     Comment: walking a lot, cardio rehab     Bike Helmet Not Asked     Seat Belt Not Asked     Self-Exams Not Asked   Social History Narrative    so I said we could give him an Accu-Chek Simi from the clinic (I may drop it off with you on 3/2/16).           Social Determinants of Health     Financial Resource Strain: Not on file   Food Insecurity: Not on file   Transportation Needs: Not on file   Physical Activity: Not on file   Stress: Not on file   Social Connections: Not on file   Intimate Partner Violence: Not on file   Housing Stability: Not on file       Review of Systems:  Skin:  Negative       Eyes:  Positive for glasses    ENT:  Negative epistaxis (got a bloody nose after having red  "wine)    Respiratory:  Positive for dyspnea on exertion with steps   Cardiovascular:  Negative;palpitations;chest pain;fatigue;lightheadedness;dizziness      Gastroenterology: Negative      Genitourinary:  not assessed      Musculoskeletal:  Positive for arthritis    Neurologic:  Negative      Psychiatric:  Negative      Heme/Lymph/Imm:  Negative      Endocrine:  Positive for diabetes      Physical Exam:  Vitals: /68   Pulse 70   Ht 1.76 m (5' 9.29\")   Wt 98.3 kg (216 lb 11.2 oz)   SpO2 100%   BMI 31.73 kg/m      Constitutional:  cooperative, alert and oriented, well developed, well nourished, in no acute distress overweight      Skin:  warm and dry to the touch, no apparent skin lesions or masses noted          Head:  normocephalic, no masses or lesions        Eyes:  pupils equal and round, conjunctivae and lids unremarkable, sclera white, no xanthalasma, EOMS intact, no nystagmus        Lymph:      ENT:  no pallor or cyanosis, dentition good        Neck:           Respiratory:  normal breath sounds, clear to auscultation, normal A-P diameter, normal symmetry, normal respiratory excursion, no use of accessory muscles         Cardiac: regular rhythm;normal S1 and S2;apical impulse not displaced   S4 no presence of murmur          pulses full and equal, no bruits auscultated                                        GI:  not assessed this visit        Extremities and Muscular Skeletal:  no deformities, clubbing, cyanosis, erythema observed;no edema         right radial site clean without hum or bruit    Neurological:  no gross motor deficits;affect appropriate        Psych:  Alert and Oriented x 3        CC  No referring provider defined for this encounter.              "

## 2022-05-14 DIAGNOSIS — E11.69 TYPE 2 DIABETES MELLITUS WITH OTHER SPECIFIED COMPLICATION, WITHOUT LONG-TERM CURRENT USE OF INSULIN (H): ICD-10-CM

## 2022-05-17 NOTE — TELEPHONE ENCOUNTER
Last OV 5/25/21     No future visit scheduled     Routing to TCs to contact patient to inform due for appointment. Thank you.      Routing refill request to provider for review/approval because:  Patient needs to be seen because:  Overdue for diabetes follow up     Liliana PERRY, Triage RN  [facility]

## 2022-05-18 RX ORDER — DULAGLUTIDE 1.5 MG/.5ML
1.5 INJECTION, SOLUTION SUBCUTANEOUS
Qty: 6 ML | Refills: 0 | Status: SHIPPED | OUTPATIENT
Start: 2022-05-18 | End: 2022-08-17

## 2022-06-20 ENCOUNTER — VIRTUAL VISIT (OUTPATIENT)
Dept: FAMILY MEDICINE | Facility: CLINIC | Age: 82
End: 2022-06-20
Payer: MEDICARE

## 2022-06-20 ENCOUNTER — LAB (OUTPATIENT)
Dept: LAB | Facility: CLINIC | Age: 82
End: 2022-06-20

## 2022-06-20 DIAGNOSIS — R30.0 DYSURIA: Primary | ICD-10-CM

## 2022-06-20 DIAGNOSIS — R30.0 DYSURIA: ICD-10-CM

## 2022-06-20 LAB
ALBUMIN UR-MCNC: NEGATIVE MG/DL
APPEARANCE UR: CLEAR
BACTERIA #/AREA URNS HPF: NORMAL /HPF
BILIRUB UR QL STRIP: NEGATIVE
COLOR UR AUTO: YELLOW
GLUCOSE UR STRIP-MCNC: NEGATIVE MG/DL
HGB UR QL STRIP: NEGATIVE
KETONES UR STRIP-MCNC: NEGATIVE MG/DL
LEUKOCYTE ESTERASE UR QL STRIP: NEGATIVE
NITRATE UR QL: NEGATIVE
PH UR STRIP: 7 [PH] (ref 5–7)
RBC #/AREA URNS AUTO: NORMAL /HPF
SP GR UR STRIP: 1.01 (ref 1–1.03)
SQUAMOUS #/AREA URNS AUTO: NORMAL /LPF
UROBILINOGEN UR STRIP-ACNC: 1 E.U./DL
WBC #/AREA URNS AUTO: NORMAL /HPF

## 2022-06-20 PROCEDURE — 99213 OFFICE O/P EST LOW 20 MIN: CPT | Mod: 95 | Performed by: INTERNAL MEDICINE

## 2022-06-20 PROCEDURE — 81001 URINALYSIS AUTO W/SCOPE: CPT

## 2022-06-20 NOTE — PROGRESS NOTES
"Jaguar is a 81 year old who is being evaluated via a billable telephone visit.      What phone number would you like to be contacted at? [phone]  How would you like to obtain your AVS? Mail a copy    Assessment & Plan     Dysuria    - UA with Microscopic reflex to Culture - lab collect    Recent good report from Urology.  Will check UA.  Rx if indicated.       BMI:   Estimated body mass index is 31.73 kg/m  as calculated from the following:    Height as of 3/8/22: 1.76 m (5' 9.29\").    Weight as of 3/8/22: 98.3 kg (216 lb 11.2 oz).       No follow-ups on file.    Salvador Mcneil MD  [facility]    Queenie Montesinos is a 81 year old, presenting for the following health issues:  Urinary Pain (Burning, frequency, several weeks )      HPI     New to me, est in clinic.  Pt with prostate CA followed by [place].  He notes dysuria    Slight burning sensation with urination.  Noted scant hematuria x1.  No fever chills or sweats.  Recently saw Urology at [place].  Admits to not drinking enough water.      Review of Systems         Objective    Vitals - Patient Reported  Temperature (Patient Reported): 97.5  F (36.4  C)  Pain Score: No Pain (0)      Vitals:  No vitals were obtained today due to virtual visit.    Physical Exam   healthy, alert and no distress  PSYCH: Alert and oriented times 3; coherent speech, normal   rate and volume, able to articulate logical thoughts, able   to abstract reason, no tangential thoughts, no hallucinations   or delusions  His affect is normal  RESP: No cough, no audible wheezing, able to talk in full sentences  Remainder of exam unable to be completed due to telephone visits      Phone call duration: 13 minutes    .  ..  "

## 2022-06-26 DIAGNOSIS — I10 ESSENTIAL HYPERTENSION: ICD-10-CM

## 2022-06-27 DIAGNOSIS — E11.69 TYPE 2 DIABETES MELLITUS WITH OTHER SPECIFIED COMPLICATION, WITHOUT LONG-TERM CURRENT USE OF INSULIN (H): ICD-10-CM

## 2022-06-28 DIAGNOSIS — I21.4 NSTEMI (NON-ST ELEVATED MYOCARDIAL INFARCTION) (H): ICD-10-CM

## 2022-06-28 RX ORDER — LISINOPRIL AND HYDROCHLOROTHIAZIDE 12.5; 2 MG/1; MG/1
TABLET ORAL
Qty: 90 TABLET | Refills: 3 | Status: SHIPPED | OUTPATIENT
Start: 2022-06-28 | End: 2023-06-28

## 2022-06-29 RX ORDER — METFORMIN HCL 500 MG
TABLET, EXTENDED RELEASE 24 HR ORAL
Qty: 360 TABLET | Refills: 3 | Status: SHIPPED | OUTPATIENT
Start: 2022-06-29 | End: 2023-06-28

## 2022-06-29 NOTE — TELEPHONE ENCOUNTER
Routing refill request to provider for review/approval because:  Lab Results   Component Value Date    A1C 8.9 05/25/2021    A1C 9.6 03/18/2021    A1C 5.5 10/22/2020    A1C 11.6 03/03/2020    A1C 9.6 02/06/2019

## 2022-06-30 RX ORDER — CARVEDILOL 12.5 MG/1
TABLET ORAL
Qty: 180 TABLET | Refills: 3 | Status: SHIPPED | OUTPATIENT
Start: 2022-06-30 | End: 2023-09-29

## 2022-06-30 NOTE — TELEPHONE ENCOUNTER
Prescription approved per [facility] Refill Protocol.  Cruzito Elizabeth RN  [facility] Triage Nurse

## 2022-08-13 DIAGNOSIS — E11.69 TYPE 2 DIABETES MELLITUS WITH OTHER SPECIFIED COMPLICATION, WITHOUT LONG-TERM CURRENT USE OF INSULIN (H): ICD-10-CM

## 2022-08-17 RX ORDER — DULAGLUTIDE 1.5 MG/.5ML
INJECTION, SOLUTION SUBCUTANEOUS
Qty: 6 ML | Refills: 0 | Status: SHIPPED | OUTPATIENT
Start: 2022-08-17 | End: 2022-11-16

## 2022-08-17 NOTE — TELEPHONE ENCOUNTER
Triage called pt to help schedule DM follow up visit. He is in [place] and agreed to call clinic when back around 09/10/2022.     Please advice on antony mckee.

## 2022-10-03 DIAGNOSIS — E78.5 HYPERLIPIDEMIA LDL GOAL <100: ICD-10-CM

## 2022-10-03 RX ORDER — ATORVASTATIN CALCIUM 80 MG/1
80 TABLET, FILM COATED ORAL DAILY
Qty: 90 TABLET | Refills: 1 | Status: SHIPPED | OUTPATIENT
Start: 2022-10-03 | End: 2023-04-03

## 2022-10-15 DIAGNOSIS — E11.69 TYPE 2 DIABETES MELLITUS WITH OTHER SPECIFIED COMPLICATION, WITHOUT LONG-TERM CURRENT USE OF INSULIN (H): ICD-10-CM

## 2022-10-19 RX ORDER — GLIMEPIRIDE 4 MG/1
TABLET ORAL
Qty: 180 TABLET | Refills: 2 | Status: SHIPPED | OUTPATIENT
Start: 2022-10-19 | End: 2023-07-14

## 2022-10-19 NOTE — TELEPHONE ENCOUNTER
Routing refill request to provider for review/approval because:  Labs out of range:  A1c  A1C  not within specified period of time    Kitty SINGLETON RN  EP Triage

## 2022-11-14 DIAGNOSIS — E11.69 TYPE 2 DIABETES MELLITUS WITH OTHER SPECIFIED COMPLICATION, WITHOUT LONG-TERM CURRENT USE OF INSULIN (H): ICD-10-CM

## 2022-11-16 RX ORDER — DULAGLUTIDE 1.5 MG/.5ML
INJECTION, SOLUTION SUBCUTANEOUS
Qty: 2 ML | Refills: 0 | Status: SHIPPED | OUTPATIENT
Start: 2022-11-16 | End: 2022-12-22

## 2022-11-16 NOTE — TELEPHONE ENCOUNTER
Routing refill request to provider for review/approval because:  Fails for A1C protocol, note added to refill   Shawna Duran RN

## 2022-12-21 DIAGNOSIS — E11.69 TYPE 2 DIABETES MELLITUS WITH OTHER SPECIFIED COMPLICATION, WITHOUT LONG-TERM CURRENT USE OF INSULIN (H): ICD-10-CM

## 2022-12-22 RX ORDER — DULAGLUTIDE 1.5 MG/.5ML
INJECTION, SOLUTION SUBCUTANEOUS
Qty: 0.5 ML | Refills: 0 | Status: SHIPPED | OUTPATIENT
Start: 2022-12-22 | End: 2023-01-20

## 2023-03-01 DIAGNOSIS — E11.69 TYPE 2 DIABETES MELLITUS WITH OTHER SPECIFIED COMPLICATION, WITHOUT LONG-TERM CURRENT USE OF INSULIN (H): ICD-10-CM

## 2023-03-01 RX ORDER — DULAGLUTIDE 1.5 MG/.5ML
INJECTION, SOLUTION SUBCUTANEOUS
Qty: 6 ML | Refills: 3 | Status: SHIPPED | OUTPATIENT
Start: 2023-03-01 | End: 2024-03-04

## 2023-04-01 DIAGNOSIS — E78.5 HYPERLIPIDEMIA LDL GOAL <100: ICD-10-CM

## 2023-04-03 RX ORDER — ATORVASTATIN CALCIUM 80 MG/1
TABLET, FILM COATED ORAL
Qty: 90 TABLET | Refills: 1 | Status: SHIPPED | OUTPATIENT
Start: 2023-04-03 | End: 2023-09-29

## 2023-04-13 DIAGNOSIS — L30.9 ECZEMA, UNSPECIFIED TYPE: ICD-10-CM

## 2023-04-13 DIAGNOSIS — I10 ESSENTIAL HYPERTENSION: ICD-10-CM

## 2023-04-13 DIAGNOSIS — E11.69 TYPE 2 DIABETES MELLITUS WITH OTHER SPECIFIED COMPLICATION, WITHOUT LONG-TERM CURRENT USE OF INSULIN (H): ICD-10-CM

## 2023-04-13 DIAGNOSIS — E11.9 TYPE 2 DIABETES MELLITUS WITHOUT COMPLICATION, WITHOUT LONG-TERM CURRENT USE OF INSULIN (H): ICD-10-CM

## 2023-04-13 RX ORDER — METFORMIN HCL 500 MG
TABLET, EXTENDED RELEASE 24 HR ORAL
Qty: 360 TABLET | Refills: 3 | OUTPATIENT
Start: 2023-04-13

## 2023-04-13 RX ORDER — LISINOPRIL AND HYDROCHLOROTHIAZIDE 12.5; 2 MG/1; MG/1
TABLET ORAL
Qty: 90 TABLET | Refills: 3 | OUTPATIENT
Start: 2023-04-13

## 2023-04-14 NOTE — TELEPHONE ENCOUNTER
Spoke w pt - pt aware needs to establish care w new provider. Will call back to schedule visit for the summer.   Esme Garner, CMA

## 2023-05-01 ENCOUNTER — TRANSFERRED RECORDS (OUTPATIENT)
Dept: MULTI SPECIALTY CLINIC | Facility: CLINIC | Age: 83
End: 2023-05-01

## 2023-05-01 LAB — RETINOPATHY: NORMAL

## 2023-06-28 ENCOUNTER — TELEPHONE (OUTPATIENT)
Dept: FAMILY MEDICINE | Facility: CLINIC | Age: 83
End: 2023-06-28
Payer: MEDICARE

## 2023-06-28 DIAGNOSIS — I10 ESSENTIAL HYPERTENSION: ICD-10-CM

## 2023-06-28 RX ORDER — LISINOPRIL AND HYDROCHLOROTHIAZIDE 12.5; 2 MG/1; MG/1
TABLET ORAL
Qty: 30 TABLET | Refills: 1 | Status: SHIPPED | OUTPATIENT
Start: 2023-06-28 | End: 2023-08-01

## 2023-06-28 NOTE — TELEPHONE ENCOUNTER
Pt quite overdue for visit.  Should schedule est care visit with anyone  Salvador Mcneil MD on 6/28/2023 at 10:56 AM

## 2023-06-28 NOTE — TELEPHONE ENCOUNTER
Routing to TCs to contact patient to inform due for appointment. Thank you.     Liliana PERRY, Triage RN  [facility]

## 2023-07-14 DIAGNOSIS — E11.69 TYPE 2 DIABETES MELLITUS WITH OTHER SPECIFIED COMPLICATION, WITHOUT LONG-TERM CURRENT USE OF INSULIN (H): ICD-10-CM

## 2023-07-14 RX ORDER — GLIMEPIRIDE 4 MG/1
TABLET ORAL
Qty: 50 TABLET | Refills: 0 | Status: SHIPPED | OUTPATIENT
Start: 2023-07-14 | End: 2023-08-07

## 2023-08-01 DIAGNOSIS — I10 ESSENTIAL HYPERTENSION: ICD-10-CM

## 2023-08-01 RX ORDER — LISINOPRIL AND HYDROCHLOROTHIAZIDE 12.5; 2 MG/1; MG/1
TABLET ORAL
Qty: 30 TABLET | Refills: 1 | Status: SHIPPED | OUTPATIENT
Start: 2023-08-01 | End: 2023-08-25

## 2023-08-06 DIAGNOSIS — E11.69 TYPE 2 DIABETES MELLITUS WITH OTHER SPECIFIED COMPLICATION, WITHOUT LONG-TERM CURRENT USE OF INSULIN (H): ICD-10-CM

## 2023-08-07 RX ORDER — GLIMEPIRIDE 4 MG/1
TABLET ORAL
Qty: 50 TABLET | Refills: 0 | Status: SHIPPED | OUTPATIENT
Start: 2023-08-07 | End: 2023-09-01

## 2023-08-25 DIAGNOSIS — I10 ESSENTIAL HYPERTENSION: ICD-10-CM

## 2023-08-25 RX ORDER — LISINOPRIL AND HYDROCHLOROTHIAZIDE 12.5; 2 MG/1; MG/1
TABLET ORAL
Qty: 30 TABLET | Refills: 1 | Status: SHIPPED | OUTPATIENT
Start: 2023-08-25 | End: 2023-10-26

## 2023-09-01 DIAGNOSIS — E11.69 TYPE 2 DIABETES MELLITUS WITH OTHER SPECIFIED COMPLICATION, WITHOUT LONG-TERM CURRENT USE OF INSULIN (H): ICD-10-CM

## 2023-09-01 RX ORDER — GLIMEPIRIDE 4 MG/1
TABLET ORAL
Qty: 30 TABLET | Refills: 0 | Status: SHIPPED | OUTPATIENT
Start: 2023-09-01 | End: 2023-10-23

## 2023-09-14 DIAGNOSIS — E11.69 TYPE 2 DIABETES MELLITUS WITH OTHER SPECIFIED COMPLICATION, WITHOUT LONG-TERM CURRENT USE OF INSULIN (H): ICD-10-CM

## 2023-09-14 RX ORDER — GLIMEPIRIDE 4 MG/1
TABLET ORAL
Qty: 180 TABLET | Refills: 0 | OUTPATIENT
Start: 2023-09-14

## 2023-09-14 NOTE — TELEPHONE ENCOUNTER
Called and spoke to the patient. Patient is scheduled for an appointment tomorrow with Dr. Boles.    Appointments in Next Year      Sep 15, 2023 10:30 AM  (Arrive by 10:10 AM)  Provider Visit with Henrique Boles MD  [facility] ([facility] ) [phone]          Patient states he has enough medication to last him until tomorrow.     Closing encounter.    Thania Bennett RN

## 2023-09-14 NOTE — PROGRESS NOTES
"  Assessment & Plan   Problem List Items Addressed This Visit          Endocrine    Type 2 diabetes mellitus with other specified complication, without long-term current use of insulin (H) - Primary    Relevant Orders    Albumin Random Urine Quantitative with Creat Ratio (Completed)    HEMOGLOBIN A1C (Completed)    Comprehensive metabolic panel (BMP + Alb, Alk Phos, ALT, AST, Total. Bili, TP) (Completed)    Orthopedic  Referral       Circulatory    Benign essential hypertension    Coronary artery disease involving native coronary artery of native heart without angina pectoris    Relevant Orders    Lipid panel reflex to direct LDL Non-fasting (Completed)     Other Visit Diagnoses       Need for shingles vaccine        Need for Tdap vaccination        Screen for colon cancer        Relevant Orders    Colonoscopy Screening  Referral    Medically complex patient        Relevant Orders    Med Therapy Management Referral    Diabetic Charcot foot (H)        Relevant Orders    Orthopedic  Referral        Discussed all chronic medical health conditions,  Stressed importance of compliance with medication,  Establish with MTM diabetic educator.  Repeat labs today.  Due for colonoscopy: Screening test.  Advised to follow-up for diabetic eye exam.  Patient reiterated understanding  Agrees to see podiatry  Follow-up in 3 months and as needed.       BMI:   Estimated body mass index is 31.08 kg/m  as calculated from the following:    Height as of this encounter: 1.778 m (5' 10\").    Weight as of this encounter: 98.2 kg (216 lb 9.6 oz).   Weight management plan: Discussed healthy diet and exercise guidelines    Work on weight loss  Regular exercise  See Patient Instructions    Henrique Boles MD  [facility] SHIVAM Montesinos is a 82 year old, presenting for the following health issues:  Establish Care (Patient is here to establish care with new PCP.  )         No data to display " "               History of Present Illness       Reason for visit:  Annual chechup    He eats 2-3 servings of fruits and vegetables daily.He exercises with enough effort to increase his heart rate 20 to 29 minutes per day.  He exercises with enough effort to increase his heart rate 5 days per week. He is missing 1 dose(s) of medications per week.     Had ?radiation in c- spine,     Prostate can, follows with urology at [place].    , for [employer] employees.    Has not been compliant with his medications really metformin.    Doses Erhlichia 2 to 3 miles a day.  Denies any chest pain dyspnea palpitations presyncope or syncope.  Denies any urinary symptoms.  Denies any leg edema or claudications.  Denies any tingling numbness in feet.      Review of Systems   Constitutional, HEENT, cardiovascular, pulmonary, gi and gu systems are negative, except as otherwise noted.      Objective    /74 (BP Location: Left arm, Patient Position: Sitting, Cuff Size: Adult Large)   Pulse 63   Temp 97  F (36.1  C) (Temporal)   Resp 18   Ht 1.778 m (5' 10\")   Wt 98.2 kg (216 lb 9.6 oz)   SpO2 97%   BMI 31.08 kg/m    Body mass index is 31.08 kg/m .  Physical Exam   GENERAL: healthy, alert and no distress, obese, pleasant  EYES: Eyes grossly normal to inspection, PERRL and conjunctivae and sclerae normal  HENT: ear canals and TM's normal, nose and mouth without ulcers or lesions  NECK: no adenopathy, no asymmetry, masses, or scars and thyroid normal to palpation  RESP: lungs clear to auscultation - no rales, rhonchi or wheezes  CV: regular rate and rhythm, normal S1 S2, no S3 or S4, no murmur, click or rub, no peripheral edema and peripheral pulses strong  ABDOMEN: soft, nontender, no hepatosplenomegaly, no masses and bowel sounds normal  MS: no gross musculoskeletal defects noted, no edema  SKIN: no suspicious lesions or rashes  NEURO: Normal strength and tone, mentation intact and speech normal  PSYCH: mentation appears " normal, affect normal/bright  Diabetic foot exam-Charcot arthropathy in bilateral feet.  Intact sensation distally in the toes.  Pulses +1-2.  Dorsalis pedis and posterior tibialis.  Lab on 06/20/2022   Component Date Value Ref Range Status    Color Urine 06/20/2022 Yellow  Colorless, Straw, Light Yellow, Yellow Final    Appearance Urine 06/20/2022 Clear  Clear Final    Glucose Urine 06/20/2022 Negative  Negative mg/dL Final    Bilirubin Urine 06/20/2022 Negative  Negative Final    Ketones Urine 06/20/2022 Negative  Negative mg/dL Final    Specific Gravity Urine 06/20/2022 1.015  1.003 - 1.035 Final    Blood Urine 06/20/2022 Negative  Negative Final    pH Urine 06/20/2022 7.0  5.0 - 7.0 Final    Protein Albumin Urine 06/20/2022 Negative  Negative mg/dL Final    Urobilinogen Urine 06/20/2022 1.0  0.2, 1.0 E.U./dL Final    Nitrite Urine 06/20/2022 Negative  Negative Final    Leukocyte Esterase Urine 06/20/2022 Negative  Negative Final    Bacteria Urine 06/20/2022 None Seen  None Seen /HPF Final    RBC Urine 06/20/2022 None Seen  0-2 /HPF /HPF Final    WBC Urine 06/20/2022 None Seen  0-5 /HPF /HPF Final    Squamous Epithelials Urine 06/20/2022 None Seen  None Seen /LPF Final

## 2023-09-15 ENCOUNTER — OFFICE VISIT (OUTPATIENT)
Dept: FAMILY MEDICINE | Facility: CLINIC | Age: 83
End: 2023-09-15
Payer: MEDICARE

## 2023-09-15 VITALS
BODY MASS INDEX: 31.01 KG/M2 | DIASTOLIC BLOOD PRESSURE: 74 MMHG | HEART RATE: 63 BPM | TEMPERATURE: 97 F | RESPIRATION RATE: 18 BRPM | OXYGEN SATURATION: 97 % | SYSTOLIC BLOOD PRESSURE: 118 MMHG | HEIGHT: 70 IN | WEIGHT: 216.6 LBS

## 2023-09-15 DIAGNOSIS — E11.610 DIABETIC CHARCOT FOOT (H): ICD-10-CM

## 2023-09-15 DIAGNOSIS — Z78.9 MEDICALLY COMPLEX PATIENT: ICD-10-CM

## 2023-09-15 DIAGNOSIS — I10 BENIGN ESSENTIAL HYPERTENSION: ICD-10-CM

## 2023-09-15 DIAGNOSIS — I25.10 CORONARY ARTERY DISEASE INVOLVING NATIVE CORONARY ARTERY OF NATIVE HEART WITHOUT ANGINA PECTORIS: ICD-10-CM

## 2023-09-15 DIAGNOSIS — Z12.11 SCREEN FOR COLON CANCER: ICD-10-CM

## 2023-09-15 DIAGNOSIS — Z23 NEED FOR SHINGLES VACCINE: ICD-10-CM

## 2023-09-15 DIAGNOSIS — Z23 NEED FOR TDAP VACCINATION: ICD-10-CM

## 2023-09-15 DIAGNOSIS — E11.69 TYPE 2 DIABETES MELLITUS WITH OTHER SPECIFIED COMPLICATION, WITHOUT LONG-TERM CURRENT USE OF INSULIN (H): Primary | ICD-10-CM

## 2023-09-15 LAB
ALBUMIN SERPL BCG-MCNC: 4 G/DL (ref 3.5–5.2)
ALP SERPL-CCNC: 138 U/L (ref 40–129)
ALT SERPL W P-5'-P-CCNC: 17 U/L (ref 0–70)
ANION GAP SERPL CALCULATED.3IONS-SCNC: 11 MMOL/L (ref 7–15)
AST SERPL W P-5'-P-CCNC: 15 U/L (ref 0–45)
BILIRUB SERPL-MCNC: 0.6 MG/DL
BUN SERPL-MCNC: 19.6 MG/DL (ref 8–23)
CALCIUM SERPL-MCNC: 9.1 MG/DL (ref 8.8–10.2)
CHLORIDE SERPL-SCNC: 104 MMOL/L (ref 98–107)
CHOLEST SERPL-MCNC: 107 MG/DL
CREAT SERPL-MCNC: 0.95 MG/DL (ref 0.67–1.17)
CREAT UR-MCNC: 66.6 MG/DL
DEPRECATED HCO3 PLAS-SCNC: 25 MMOL/L (ref 22–29)
EGFRCR SERPLBLD CKD-EPI 2021: 81 ML/MIN/1.73M2
GLUCOSE SERPL-MCNC: 275 MG/DL (ref 70–99)
HBA1C MFR BLD: 8 % (ref 0–5.6)
HDLC SERPL-MCNC: 35 MG/DL
LDLC SERPL CALC-MCNC: 44 MG/DL
MICROALBUMIN UR-MCNC: <12 MG/L
MICROALBUMIN/CREAT UR: NORMAL MG/G{CREAT}
NONHDLC SERPL-MCNC: 72 MG/DL
POTASSIUM SERPL-SCNC: 4.3 MMOL/L (ref 3.4–5.3)
PROT SERPL-MCNC: 6.5 G/DL (ref 6.4–8.3)
SODIUM SERPL-SCNC: 140 MMOL/L (ref 136–145)
TRIGL SERPL-MCNC: 140 MG/DL

## 2023-09-15 PROCEDURE — 80061 LIPID PANEL: CPT | Performed by: INTERNAL MEDICINE

## 2023-09-15 PROCEDURE — 82043 UR ALBUMIN QUANTITATIVE: CPT | Performed by: INTERNAL MEDICINE

## 2023-09-15 PROCEDURE — 99214 OFFICE O/P EST MOD 30 MIN: CPT | Mod: 25 | Performed by: INTERNAL MEDICINE

## 2023-09-15 PROCEDURE — 36415 COLL VENOUS BLD VENIPUNCTURE: CPT | Performed by: INTERNAL MEDICINE

## 2023-09-15 PROCEDURE — 80053 COMPREHEN METABOLIC PANEL: CPT | Performed by: INTERNAL MEDICINE

## 2023-09-15 PROCEDURE — 82570 ASSAY OF URINE CREATININE: CPT | Performed by: INTERNAL MEDICINE

## 2023-09-15 PROCEDURE — G0008 ADMIN INFLUENZA VIRUS VAC: HCPCS | Performed by: INTERNAL MEDICINE

## 2023-09-15 PROCEDURE — 90662 IIV NO PRSV INCREASED AG IM: CPT | Performed by: INTERNAL MEDICINE

## 2023-09-15 PROCEDURE — 83036 HEMOGLOBIN GLYCOSYLATED A1C: CPT | Performed by: INTERNAL MEDICINE

## 2023-09-15 ASSESSMENT — PAIN SCALES - GENERAL: PAINLEVEL: NO PAIN (0)

## 2023-09-16 NOTE — RESULT ENCOUNTER NOTE
Evelio Tucker reviewed rest of your labs,  Lipid panel shows low HDL which is a good cholesterol, LDL which is the bad cholesterol is at goal, triglyceride is normal, you can increase the good cholesterol by increase exercise activities and weight loss; strongly recommended.  Continue atorvastatin same dose 80 mg 1 tablet once daily.  Chemistry shows normal electrolytes, normal calcium level, normal liver enzymes,  Normal kidney function test,  Your test for diabetes is still elevated 8.0.  You are currently on glimepiride and metformin as well as Trulicity.  Please follow-up with the medical therapy management and I recommend you see diabetic educator as well.  Goal for HbA1c is less than 7.5,  Continue to watch your diet low carbs diet and low-fat diet.  We will repeat your diabetes test in 3 months.  I will place a referral to diabetic educator.  Also schedule follow-up with our pharmacy team MTM with Dr. Clair UNDERWOOD.    Any further questions please let me know,  Dr Boles

## 2023-09-17 DIAGNOSIS — I10 ESSENTIAL HYPERTENSION: ICD-10-CM

## 2023-09-18 ENCOUNTER — TELEPHONE (OUTPATIENT)
Dept: FAMILY MEDICINE | Facility: CLINIC | Age: 83
End: 2023-09-18

## 2023-09-18 ENCOUNTER — TELEPHONE (OUTPATIENT)
Dept: FAMILY MEDICINE | Facility: CLINIC | Age: 83
End: 2023-09-18
Payer: MEDICARE

## 2023-09-18 RX ORDER — LISINOPRIL AND HYDROCHLOROTHIAZIDE 12.5; 2 MG/1; MG/1
TABLET ORAL
Qty: 30 TABLET | Refills: 1 | OUTPATIENT
Start: 2023-09-18

## 2023-09-18 NOTE — TELEPHONE ENCOUNTER
Writer called and spoke with patient and reviewed PCP's result note, patient expressed verbal understanding and is agreeable.    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

## 2023-09-18 NOTE — TELEPHONE ENCOUNTER
----- Message from Henrique Boles MD sent at 9/15/2023  8:54 PM CDT -----  Evelio Tucker reviewed rest of your labs,  Lipid panel shows low HDL which is a good cholesterol, LDL which is the bad cholesterol is at goal, triglyceride is normal, you can increase the good cholesterol by increase exercise activities and weight loss; strongly recommended.  Continue atorvastatin same dose 80 mg 1 tablet once daily.  Chemistry shows normal electrolytes, normal calcium level, normal liver enzymes,  Normal kidney function test,  Your test for diabetes is still elevated 8.0.  You are currently on glimepiride and metformin as well as Trulicity.  Please follow-up with the medical therapy management and I recommend you see diabetic educator as well.  Goal for HbA1c is less than 7.5,  Continue to watch your diet low carbs diet and low-fat diet.  We will repeat your diabetes test in 3 months.  I will place a referral to diabetic educator.  Also schedule follow-up with our pharmacy team MTM with Dr. Clair UNDERWOOD.    Any further questions please let me know,  Dr Boles

## 2023-09-18 NOTE — TELEPHONE ENCOUNTER
MTM referral from: [facility] visit (referral by provider)    MTM referral outreach attempt #1 on September 18, 2023 at 3:19 PM      Outcome: Patient is not interested at this time because he's on vacation until October, will route to MT Pharmacist/Provider as an FYI. Thank you for the referral.     Use vbc for the carrier/Plan on the flowsheet    Tio Rodriguez [facility]

## 2023-09-19 NOTE — CONFIDENTIAL NOTE
General Call    Contacts         Type Contact Phone/Fax    09/18/2023 10:37 AM CDT Phone (Outgoing) Jaguar Tristan (Self) [phone] (M)    Talked with Patient           Reason for Call:  Diabetes referral    What are your questions or concerns:  The patient is returning a call to Diabetes scheduling expressing he is currently out of town and will call \back near end of Sept to schedule a booking.    Date of last appointment with provider: n/a    Okay to leave a detailed message?: Yes at Cell number on file:    Telephone Information:   Mobile [phone]     No call back is necessary

## 2023-09-29 DIAGNOSIS — E78.5 HYPERLIPIDEMIA LDL GOAL <100: ICD-10-CM

## 2023-09-29 DIAGNOSIS — I21.4 NSTEMI (NON-ST ELEVATED MYOCARDIAL INFARCTION) (H): ICD-10-CM

## 2023-09-29 RX ORDER — ATORVASTATIN CALCIUM 80 MG/1
TABLET, FILM COATED ORAL
Qty: 90 TABLET | Refills: 1 | Status: SHIPPED | OUTPATIENT
Start: 2023-09-29 | End: 2024-03-25

## 2023-09-29 RX ORDER — CARVEDILOL 12.5 MG/1
TABLET ORAL
Qty: 180 TABLET | Refills: 4 | Status: SHIPPED | OUTPATIENT
Start: 2023-09-29 | End: 2024-09-30

## 2023-09-30 DIAGNOSIS — E11.69 TYPE 2 DIABETES MELLITUS WITH OTHER SPECIFIED COMPLICATION, WITHOUT LONG-TERM CURRENT USE OF INSULIN (H): ICD-10-CM

## 2023-10-03 ENCOUNTER — ALLIED HEALTH/NURSE VISIT (OUTPATIENT)
Dept: EDUCATION SERVICES | Facility: CLINIC | Age: 83
End: 2023-10-03
Payer: MEDICARE

## 2023-10-03 DIAGNOSIS — E11.69 TYPE 2 DIABETES MELLITUS WITH OTHER SPECIFIED COMPLICATION, WITHOUT LONG-TERM CURRENT USE OF INSULIN (H): ICD-10-CM

## 2023-10-03 PROCEDURE — G0108 DIAB MANAGE TRN  PER INDIV: HCPCS | Performed by: DIETITIAN, REGISTERED

## 2023-10-03 RX ORDER — LANCETS
EACH MISCELLANEOUS
Qty: 100 EACH | Refills: 6 | Status: SHIPPED | OUTPATIENT
Start: 2023-10-03 | End: 2023-10-04

## 2023-10-03 RX ORDER — METFORMIN HCL 500 MG
TABLET, EXTENDED RELEASE 24 HR ORAL
Qty: 360 TABLET | Refills: 0 | Status: SHIPPED | OUTPATIENT
Start: 2023-10-03

## 2023-10-03 NOTE — PROGRESS NOTES
Diabetes Self-Management Education & Support    Presents for: Individual review    Type of Service: In Person Visit    ASSESSMENT:  Jaguar comes in for review of his diabetes management. Has questions about his meds, exercise and diet. We reviewed these things in detail. He has been taking metformin now as prescribed, had only been taking 2 tablets daily for the past year at least but now up to 4 tablets. Sometimes missed evening dose of metformin. Occasionally experiences some sweating at night which could be blood sugar related but he isn't sure. Is not testing his blood sugars at all right now.     Patient's most recent   Lab Results   Component Value Date    A1C 8.0 09/15/2023    A1C 8.9 05/25/2021     is not meeting goal of <7.0    Diabetes knowledge and skills assessment:   Patient is knowledgeable in diabetes management concepts related to: Taking Medication    Continue education with the following diabetes management concepts: Healthy Eating, Being Active, Monitoring, Taking Medication, Problem Solving, Reducing Risks, and Healthy Coping    Based on learning assessment above, most appropriate setting for further diabetes education would be: Group class or Individual setting.      PLAN    Check blood sugars 1-2 times daily, glucometer & supplies sent to pharmacy   Take meds as prescribed, ok to consolidate metformin XR into once dose if needed   Count carbohydrates at meals & snacks - 45-75 grams/meal & 15-30 grams/snack   Increase exercise with goal of >150 minutes/week moderate physical activity       Topics to cover at upcoming visits: Monitoring, Taking Medication, Problem Solving, Reducing Risks, and Healthy Coping    Follow-up: 12/5/23    See Care Plan for co-developed, patient-state behavior change goals.  AVS provided for patient today.    Education Materials Provided:  [facility] Understanding Diabetes Booklet and BG Log Sheet      SUBJECTIVE/OBJECTIVE:  Presents for: Individual  "review  Accompanied by: Self  Diabetes education in the past 24mo: No  Focus of Visit: Taking Medication  Diabetes type: Type 2  Disease course: Getting harder to manage  How confident are you filling out medical forms by yourself:: Not Assessed  Diabetes management related comments/concerns: My A1C was high because I wasn't taking both doses of my metformin  Transportation concerns: No  Difficulty affording diabetes medication?: Sometimes  Difficulty affording diabetes testing supplies?: No  Cultural Influences/Ethnic Background:  Not  or     Diabetes Symptoms & Complications:     Complications assessed today?: No    Patient Problem List and Family Medical History reviewed for relevant medical history, current medical status, and diabetes risk factors.    Vitals:  There were no vitals taken for this visit.  Estimated body mass index is 31.08 kg/m  as calculated from the following:    Height as of 9/15/23: 1.778 m (5' 10\").    Weight as of 9/15/23: 98.2 kg (216 lb 9.6 oz).   Last 3 BP:   BP Readings from Last 3 Encounters:   09/15/23 118/74   03/08/22 116/68   05/25/21 (!) 141/82       History   Smoking Status    Never   Smokeless Tobacco    Never       Labs:  Lab Results   Component Value Date    A1C 8.0 09/15/2023    A1C 8.9 05/25/2021     Lab Results   Component Value Date     09/15/2023     03/07/2022     05/25/2021     Lab Results   Component Value Date    LDL 44 09/15/2023    LDL 61 11/17/2020     HDL Cholesterol   Date Value Ref Range Status   11/17/2020 40 >39 mg/dL Final     Direct Measure HDL   Date Value Ref Range Status   09/15/2023 35 (L) >=40 mg/dL Final   ]  GFR Estimate   Date Value Ref Range Status   09/15/2023 81 >60 mL/min/1.73m2 Final   05/25/2021 83 >60 mL/min/[1.73_m2] Final     Comment:     Non  GFR Calc  Starting 12/18/2018, serum creatinine based estimated GFR (eGFR) will be   calculated using the Chronic Kidney Disease Epidemiology " Collaboration   (CKD-EPI) equation.       GFR Estimate If Black   Date Value Ref Range Status   05/25/2021 >90 >60 mL/min/[1.73_m2] Final     Comment:      GFR Calc  Starting 12/18/2018, serum creatinine based estimated GFR (eGFR) will be   calculated using the Chronic Kidney Disease Epidemiology Collaboration   (CKD-EPI) equation.       Lab Results   Component Value Date    CR 0.95 09/15/2023    CR 0.86 05/25/2021     No results found for: MICROALBUMIN    Healthy Eating:  Healthy Eating Assessed Today: Yes  Cultural/Cheondoism diet restrictions?: No  Meal planning/habits: None  Meals include: Breakfast, Lunch, Dinner  Breakfast: pumpkin pastry, juice  Lunch: salad OR sandwich  Dinner: biggest meal of the day  Has patient met with a dietitian in the past?: No    Being Active:  Being Active Assessed Today: Yes  Exercise:: Currently not exercising  Barrier to exercise: None    Monitoring:  Monitoring Assessed Today: Yes  Times checking blood sugar at home (number): Never    na    Taking Medications:  Diabetes Medication(s)       Biguanides       metFORMIN (GLUCOPHAGE XR) 500 MG 24 hr tablet    TAKE 2 TABLETS BY MOUTH TWICE A DAY WITH MEALS      Sulfonylureas       glimepiride (AMARYL) 4 MG tablet    TAKE 2 TABLETS BY MOUTH EVERY DAY IN THE MORNING BEFORE BREAKFAST      Incretin Mimetic Agents       TRULICITY 1.5 MG/0.5ML pen    INJECT 1.5 MG SUBCUTANEOUSLY EVERY 7 DAYS            Taking Medication Assessed Today: Yes  Current Treatments: Non-insulin Injectables, Oral Medication (taken by mouth)  Problems taking diabetes medications regularly?: Yes  Diabetes medication side effects?: Yes    Problem Solving:  Problem Solving Assessed Today: Yes  Is the patient at risk for hypoglycemia?: Yes  Hypoglycemia Frequency: Never  Medical ID: No  Does patient have glucagon emergency kit?: No  Is the patient at risk for DKA?: No  Does patient have severe weather/disaster plan for diabetes management?: No  Does  patient have sick day plan for diabetes management?: No              Reducing Risks:  Reducing Risks Assessed Today: No    Healthy Coping:  Healthy Coping Assessed Today: Yes  Emotional response to diabetes: Ready to learn  Informal Support system:: Spouse  Stage of change: PREPARATION (Decided to change - considering how)  Support resources: None  Patient Activation Measure Survey Score:      2/1/2013     2:00 PM   LOUIE Score (Last Two)   LOUIE Raw Score 39   Activation Score 56.4   LOUIE Level 3         Care Plan and Education Provided:  Care Plan: Diabetes   Updates made by Norma Caldwell RD since 10/3/2023 12:00 AM        Problem: HbA1C Not In Goal         Goal: Establish Regular Follow-Ups with PCP         Task: Discuss with PCP the recommended timing for patient's next follow up visit(s)    Responsible User: Norma Caldwell RD        Task: Discuss schedule for PCP visits with patient    Responsible User: Norma Caldwell RD        Goal: Get HbA1C Level in Goal         Task: Educate patient on diabetes education self-management topics    Responsible User: Norma Caldwell RD        Task: Educate patient on benefits of regular glucose monitoring    Responsible User: Norma Caldwell RD        Task: Refer patient to appropriate extended care team member, as needed (Medication Therapy Management, Behavioral Health, Physical Therapy, etc.)    Responsible User: Norma Caldwell RD        Task: Discuss diabetes treatment plan with patient    Responsible User: Norma Caldwell RD        Problem: Diabetes Self-Management Education Needed to Optimize Self-Care Behaviors         Goal: Understand diabetes pathophysiology and disease progression         Task: Provide education on diabetes pathophysiology and disease progression specfic to patient's diabetes type    Responsible User: Norma Caldwell RD        Goal: Healthy Eating - follow a healthy eating pattern for diabetes         Task: Provide education on portion control and  consistency in amount, composition and timing of food intake Completed 10/3/2023   Responsible User: Norma Caldwell RD        Task: Provide education on managing carbohydrate intake (carbohydrate counting, plate planning method, etc.) Completed 10/3/2023   Responsible User: Norma Caldwell RD        Task: Provide education on weight management Completed 10/3/2023   Responsible User: Norma Caldwell RD        Task: Provide education on heart healthy eating    Responsible User: Norma Caldwell RD        Task: Provide education on eating out    Responsible User: Norma Caldwell RD        Task: Develop individualized healthy eating plan with patient    Responsible User: Norma Caldwell RD        Goal: Being Active - get regular physical activity, working up to at least 150 minutes per week         Task: Provide education on relationship of activity to glucose and precautions to take if at risk for low glucose Completed 10/3/2023   Responsible User: Norma Caldwell RD        Task: Discuss barriers to physical activity with patient Completed 10/3/2023   Responsible User: Norma Caldwell RD        Task: Develop physical activity plan with patient    Responsible User: Norma Caldwell RD        Task: Explore community resources including walking groups, assistance programs, and home videos    Responsible User: Norma Caldwell RD        Goal: Monitoring - monitor glucose and ketones as directed    This Visit's Progress: 0%   Note:    I will check blood sugars at least once daily until follow up appointment.        Task: Provide education on blood glucose monitoring (purpose, proper technique, frequency, glucose targets, interpreting results, when to use glucose control solution, sharps disposal) Completed 10/3/2023   Responsible User: Norma Caldwell RD        Task: Provide education on continuous glucose monitoring (sensor placement, use of loni or /reader, understanding glucose trends, alerts and alarms, differences between  sensor glucose and blood glucose)    Responsible User: Norma Caldwell RD        Task: Provide education on ketone monitoring (when to monitor, frequency, etc.)    Responsible User: Norma Caldwell RD        Goal: Taking Medication - patient is consistently taking medications as directed         Task: Provide education on action of prescribed medication, including when to take and possible side effects Completed 10/3/2023   Responsible User: Norma Caldwell RD        Task: Provide education on insulin and injectable diabetes medications, including administration, storage, site selection and rotation for injection sites    Responsible User: Norma Caldwell RD        Task: Discuss barriers to medication adherence with patient and provide management technique ideas as appropriate    Responsible User: Norma Caldwell RD        Task: Provide education on frequency and refill details of medications    Responsible User: Norma Caldwell RD        Goal: Problem Solving - know how to prevent and manage short-term diabetes complications         Task: Provide education on high blood glucose - causes, signs/symptoms, prevention and treatment    Responsible User: Norma Caldwell RD        Task: Provide education on low blood glucose - causes, signs/symptoms, prevention, treatment, carrying a carbohydrate source at all times, and medical identification Completed 10/3/2023   Responsible User: Norma Caldwell RD        Task: Provide education on safe travel with diabetes    Responsible User: Norma Caldwell RD        Task: Provide education on how to care for diabetes on sick days    Responsible User: Norma Caldwell RD        Task: Provide education on when to call a health care provider    Responsible User: Norma Caldwell RD        Goal: Reducing Risks - know how to prevent and treat long-term diabetes complications         Task: Provide education on major complications of diabetes, prevention, early diagnostic measures and treatment of  complications    Responsible User: Norma Caldwell RD        Task: Provide education on recommended care for dental, eye and foot health    Responsible User: Norma Caldwell RD        Task: Provide education on Hemoglobin A1c - goals and relationship to blood glucose levels Completed 10/3/2023   Responsible User: Norma Caldwell RD        Task: Provide education on recommendations for heart health - lipid levels and goals, blood pressure and goals, and aspirin therapy, if indicated    Responsible User: Nomra Caldwell RD        Task: Provide education on tobacco cessation    Responsible User: Norma Caldwell RD        Goal: Healthy Coping - use available resources to cope with the challenges of managing diabetes         Task: Discuss recognizing feelings about having diabetes    Responsible User: Norma Caldwell RD        Task: Provide education on the benefits of making appropriate lifestyle changes    Responsible User: Norma Caldwell RD        Task: Provide education on benefits of utilizing support systems    Responsible User: Norma Caldwell RD        Task: Discuss methods for coping with stress    Responsible User: Norma Caldwell RD        Task: Provide education on when to seek professional counseling    Responsible User: Norma Caldwell RD Elise Graham, RD, BELA, [facility]     Time Spent: 30 minutes  Encounter Type: Individual    Any diabetes medication dose changes were made via the CDE Protocol per the patient's referring provider. A copy of this encounter was shared with the provider.

## 2023-10-03 NOTE — PATIENT INSTRUCTIONS
- Start checking your blood sugar 1-2 times daily and keep a log of your results   - Take all 4 tablets of metformin daily - either 2 with breakfast & 2 with dinner OR all 4 tablets with a meal daily   - Increase exercise with goal of >150 minutes/week moderate physical activity   - Count carbohydrates at meals & snacks - 45-75 grams/meal & 15-30 grams/snack    Call or send a [employer] message with any questions or concerns    Norma Caldwell RD, LD, [facility]   Diabetes Education Triage Line: [phone]  Diabetes Education Appointment [phone]

## 2023-10-03 NOTE — TELEPHONE ENCOUNTER
Prescription approved per [facility] Refill Protocol.  Thania Bennett, RN  [facility] Triage Nurse

## 2023-10-03 NOTE — LETTER
10/3/2023         RE: Jaguar Tristan  [address] [address] [facility] [address]        Dear Colleague,    Thank you for referring your patient, Jaguar Tristan, to the [facility] [place]. Please see a copy of my visit note below.    Diabetes Self-Management Education & Support    Presents for: Individual review    Type of Service: In Person Visit    ASSESSMENT:  Jaguar comes in for review of his diabetes management. Has questions about his meds, exercise and diet. We reviewed these things in detail. He has been taking metformin now as prescribed, had only been taking 2 tablets daily for the past year at least but now up to 4 tablets. Sometimes missed evening dose of metformin. Occasionally experiences some sweating at night which could be blood sugar related but he isn't sure. Is not testing his blood sugars at all right now.     Patient's most recent   Lab Results   Component Value Date    A1C 8.0 09/15/2023    A1C 8.9 05/25/2021     is not meeting goal of <7.0    Diabetes knowledge and skills assessment:   Patient is knowledgeable in diabetes management concepts related to: Taking Medication    Continue education with the following diabetes management concepts: Healthy Eating, Being Active, Monitoring, Taking Medication, Problem Solving, Reducing Risks, and Healthy Coping    Based on learning assessment above, most appropriate setting for further diabetes education would be: Group class or Individual setting.      PLAN    Check blood sugars 1-2 times daily, glucometer & supplies sent to pharmacy   Take meds as prescribed, ok to consolidate metformin XR into once dose if needed   Count carbohydrates at meals & snacks - 45-75 grams/meal & 15-30 grams/snack   Increase exercise with goal of >150 minutes/week moderate physical activity       Topics to cover at upcoming visits: Monitoring, Taking Medication, Problem Solving, Reducing Risks, and Healthy Coping    Follow-up: 12/5/23    See Care  "Plan for co-developed, patient-state behavior change goals.  AVS provided for patient today.    Education Materials Provided:  [facility] Understanding Diabetes Booklet and BG Log Sheet      SUBJECTIVE/OBJECTIVE:  Presents for: Individual review  Accompanied by: Self  Diabetes education in the past 24mo: No  Focus of Visit: Taking Medication  Diabetes type: Type 2  Disease course: Getting harder to manage  How confident are you filling out medical forms by yourself:: Not Assessed  Diabetes management related comments/concerns: My A1C was high because I wasn't taking both doses of my metformin  Transportation concerns: No  Difficulty affording diabetes medication?: Sometimes  Difficulty affording diabetes testing supplies?: No  Cultural Influences/Ethnic Background:  Not  or     Diabetes Symptoms & Complications:     Complications assessed today?: No    Patient Problem List and Family Medical History reviewed for relevant medical history, current medical status, and diabetes risk factors.    Vitals:  There were no vitals taken for this visit.  Estimated body mass index is 31.08 kg/m  as calculated from the following:    Height as of 9/15/23: 1.778 m (5' 10\").    Weight as of 9/15/23: 98.2 kg (216 lb 9.6 oz).   Last 3 BP:   BP Readings from Last 3 Encounters:   09/15/23 118/74   03/08/22 116/68   05/25/21 (!) 141/82       History   Smoking Status     Never   Smokeless Tobacco     Never       Labs:  Lab Results   Component Value Date    A1C 8.0 09/15/2023    A1C 8.9 05/25/2021     Lab Results   Component Value Date     09/15/2023     03/07/2022     05/25/2021     Lab Results   Component Value Date    LDL 44 09/15/2023    LDL 61 11/17/2020     HDL Cholesterol   Date Value Ref Range Status   11/17/2020 40 >39 mg/dL Final     Direct Measure HDL   Date Value Ref Range Status   09/15/2023 35 (L) >=40 mg/dL Final   ]  GFR Estimate   Date Value Ref Range Status   09/15/2023 81 >60 " mL/min/1.73m2 Final   05/25/2021 83 >60 mL/min/[1.73_m2] Final     Comment:     Non  GFR Calc  Starting 12/18/2018, serum creatinine based estimated GFR (eGFR) will be   calculated using the Chronic Kidney Disease Epidemiology Collaboration   (CKD-EPI) equation.       GFR Estimate If Black   Date Value Ref Range Status   05/25/2021 >90 >60 mL/min/[1.73_m2] Final     Comment:      GFR Calc  Starting 12/18/2018, serum creatinine based estimated GFR (eGFR) will be   calculated using the Chronic Kidney Disease Epidemiology Collaboration   (CKD-EPI) equation.       Lab Results   Component Value Date    CR 0.95 09/15/2023    CR 0.86 05/25/2021     No results found for: MICROALBUMIN    Healthy Eating:  Healthy Eating Assessed Today: Yes  Cultural/Catholic diet restrictions?: No  Meal planning/habits: None  Meals include: Breakfast, Lunch, Dinner  Breakfast: pumpkin pastry, juice  Lunch: salad OR sandwich  Dinner: biggest meal of the day  Has patient met with a dietitian in the past?: No    Being Active:  Being Active Assessed Today: Yes  Exercise:: Currently not exercising  Barrier to exercise: None    Monitoring:  Monitoring Assessed Today: Yes  Times checking blood sugar at home (number): Never    na    Taking Medications:  Diabetes Medication(s)       Biguanides       metFORMIN (GLUCOPHAGE XR) 500 MG 24 hr tablet    TAKE 2 TABLETS BY MOUTH TWICE A DAY WITH MEALS      Sulfonylureas       glimepiride (AMARYL) 4 MG tablet    TAKE 2 TABLETS BY MOUTH EVERY DAY IN THE MORNING BEFORE BREAKFAST      Incretin Mimetic Agents       TRULICITY 1.5 MG/0.5ML pen    INJECT 1.5 MG SUBCUTANEOUSLY EVERY 7 DAYS            Taking Medication Assessed Today: Yes  Current Treatments: Non-insulin Injectables, Oral Medication (taken by mouth)  Problems taking diabetes medications regularly?: Yes  Diabetes medication side effects?: Yes    Problem Solving:  Problem Solving Assessed Today: Yes  Is the patient at  risk for hypoglycemia?: Yes  Hypoglycemia Frequency: Never  Medical ID: No  Does patient have glucagon emergency kit?: No  Is the patient at risk for DKA?: No  Does patient have severe weather/disaster plan for diabetes management?: No  Does patient have sick day plan for diabetes management?: No              Reducing Risks:  Reducing Risks Assessed Today: No    Healthy Coping:  Healthy Coping Assessed Today: Yes  Emotional response to diabetes: Ready to learn  Informal Support system:: Spouse  Stage of change: PREPARATION (Decided to change - considering how)  Support resources: None  Patient Activation Measure Survey Score:      2/1/2013     2:00 PM   LOUIE Score (Last Two)   LOUIE Raw Score 39   Activation Score 56.4   LOUIE Level 3         Care Plan and Education Provided:  Care Plan: Diabetes   Updates made by Norma Caldwell RD since 10/3/2023 12:00 AM        Problem: HbA1C Not In Goal         Goal: Establish Regular Follow-Ups with PCP         Task: Discuss with PCP the recommended timing for patient's next follow up visit(s)    Responsible User: Norma Caldwell RD        Task: Discuss schedule for PCP visits with patient    Responsible User: Norma Caldwell RD        Goal: Get HbA1C Level in Goal         Task: Educate patient on diabetes education self-management topics    Responsible User: Norma Caldwell RD        Task: Educate patient on benefits of regular glucose monitoring    Responsible User: Norma Caldwell RD        Task: Refer patient to appropriate extended care team member, as needed (Medication Therapy Management, Behavioral Health, Physical Therapy, etc.)    Responsible User: Norma Caldwell RD        Task: Discuss diabetes treatment plan with patient    Responsible User: Norma Caldwell RD        Problem: Diabetes Self-Management Education Needed to Optimize Self-Care Behaviors         Goal: Understand diabetes pathophysiology and disease progression         Task: Provide education on diabetes  pathophysiology and disease progression specfic to patient's diabetes type    Responsible User: Norma Caldwell RD        Goal: Healthy Eating - follow a healthy eating pattern for diabetes         Task: Provide education on portion control and consistency in amount, composition and timing of food intake Completed 10/3/2023   Responsible User: Norma Caldwell RD        Task: Provide education on managing carbohydrate intake (carbohydrate counting, plate planning method, etc.) Completed 10/3/2023   Responsible User: Norma Caldwell RD        Task: Provide education on weight management Completed 10/3/2023   Responsible User: Norma Caldwell RD        Task: Provide education on heart healthy eating    Responsible User: Norma Caldwell RD        Task: Provide education on eating out    Responsible User: Norma Caldwell RD        Task: Develop individualized healthy eating plan with patient    Responsible User: Norma Caldwell RD        Goal: Being Active - get regular physical activity, working up to at least 150 minutes per week         Task: Provide education on relationship of activity to glucose and precautions to take if at risk for low glucose Completed 10/3/2023   Responsible User: Norma Caldwell RD        Task: Discuss barriers to physical activity with patient Completed 10/3/2023   Responsible User: Norma Caldwell RD        Task: Develop physical activity plan with patient    Responsible User: Norma Caldwell RD        Task: Explore community resources including walking groups, assistance programs, and home videos    Responsible User: Norma Caldwell RD        Goal: Monitoring - monitor glucose and ketones as directed    This Visit's Progress: 0%   Note:    I will check blood sugars at least once daily until follow up appointment.        Task: Provide education on blood glucose monitoring (purpose, proper technique, frequency, glucose targets, interpreting results, when to use glucose control solution, sharps disposal)  Completed 10/3/2023   Responsible User: Norma Caldwell RD        Task: Provide education on continuous glucose monitoring (sensor placement, use of loni or /reader, understanding glucose trends, alerts and alarms, differences between sensor glucose and blood glucose)    Responsible User: Norma Caldwell RD        Task: Provide education on ketone monitoring (when to monitor, frequency, etc.)    Responsible User: Nomra Caldwell RD        Goal: Taking Medication - patient is consistently taking medications as directed         Task: Provide education on action of prescribed medication, including when to take and possible side effects Completed 10/3/2023   Responsible User: Norma Caldwell RD        Task: Provide education on insulin and injectable diabetes medications, including administration, storage, site selection and rotation for injection sites    Responsible User: Norma Caldwell RD        Task: Discuss barriers to medication adherence with patient and provide management technique ideas as appropriate    Responsible User: Norma Caldwell RD        Task: Provide education on frequency and refill details of medications    Responsible User: Norma Caldwell RD        Goal: Problem Solving - know how to prevent and manage short-term diabetes complications         Task: Provide education on high blood glucose - causes, signs/symptoms, prevention and treatment    Responsible User: Norma Caldwell RD        Task: Provide education on low blood glucose - causes, signs/symptoms, prevention, treatment, carrying a carbohydrate source at all times, and medical identification Completed 10/3/2023   Responsible User: Norma Caldwell RD        Task: Provide education on safe travel with diabetes    Responsible User: Norma Caldwell RD        Task: Provide education on how to care for diabetes on sick days    Responsible User: Norma Caldwell RD        Task: Provide education on when to call a health care provider    Responsible  User: Norma Caldwell RD        Goal: Reducing Risks - know how to prevent and treat long-term diabetes complications         Task: Provide education on major complications of diabetes, prevention, early diagnostic measures and treatment of complications    Responsible User: Norma Caldwell RD        Task: Provide education on recommended care for dental, eye and foot health    Responsible User: Norma Caldwell RD        Task: Provide education on Hemoglobin A1c - goals and relationship to blood glucose levels Completed 10/3/2023   Responsible User: Norma Caldwell RD        Task: Provide education on recommendations for heart health - lipid levels and goals, blood pressure and goals, and aspirin therapy, if indicated    Responsible User: Norma Caldwell RD        Task: Provide education on tobacco cessation    Responsible User: Norma Caldwell RD        Goal: Healthy Coping - use available resources to cope with the challenges of managing diabetes         Task: Discuss recognizing feelings about having diabetes    Responsible User: Norma Caldwell RD        Task: Provide education on the benefits of making appropriate lifestyle changes    Responsible User: Norma Caldwell RD        Task: Provide education on benefits of utilizing support systems    Responsible User: Norma Caldwell RD        Task: Discuss methods for coping with stress    Responsible User: Norma Caldwell RD        Task: Provide education on when to seek professional counseling    Responsible User: Norma Caldwell RD Elise Graham, RD, BEL[facility], [facility]     Time Spent: 30 minutes  Encounter Type: Individual    Any diabetes medication dose changes were made via the CDE Protocol per the patient's referring provider. A copy of this encounter was shared with the provider.

## 2023-10-04 RX ORDER — BLOOD-GLUCOSE METER
1 EACH MISCELLANEOUS DAILY
Qty: 1 KIT | Refills: 0 | Status: SHIPPED | OUTPATIENT
Start: 2023-10-04

## 2023-10-04 RX ORDER — LANCETS
EACH MISCELLANEOUS
Qty: 100 EACH | Refills: 3 | Status: SHIPPED | OUTPATIENT
Start: 2023-10-04

## 2023-10-04 RX ORDER — BLOOD SUGAR DIAGNOSTIC
STRIP MISCELLANEOUS
Qty: 100 STRIP | Refills: 3 | Status: SHIPPED | OUTPATIENT
Start: 2023-10-04

## 2023-10-23 DIAGNOSIS — E11.69 TYPE 2 DIABETES MELLITUS WITH OTHER SPECIFIED COMPLICATION, WITHOUT LONG-TERM CURRENT USE OF INSULIN (H): ICD-10-CM

## 2023-10-23 RX ORDER — GLIMEPIRIDE 4 MG/1
TABLET ORAL
Qty: 180 TABLET | Refills: 0 | Status: SHIPPED | OUTPATIENT
Start: 2023-10-23 | End: 2024-01-11

## 2023-10-26 DIAGNOSIS — I10 ESSENTIAL HYPERTENSION: ICD-10-CM

## 2023-10-26 RX ORDER — LISINOPRIL AND HYDROCHLOROTHIAZIDE 12.5; 2 MG/1; MG/1
TABLET ORAL
Qty: 30 TABLET | Refills: 8 | Status: SHIPPED | OUTPATIENT
Start: 2023-10-26 | End: 2024-06-27

## 2023-12-01 ENCOUNTER — HOSPITAL ENCOUNTER (OUTPATIENT)
Facility: CLINIC | Age: 83
Discharge: HOME | End: 2023-12-01
Attending: COLON & RECTAL SURGERY | Admitting: COLON & RECTAL SURGERY
Payer: MEDICARE

## 2023-12-01 VITALS
RESPIRATION RATE: 17 BRPM | OXYGEN SATURATION: 99 % | HEART RATE: 67 BPM | DIASTOLIC BLOOD PRESSURE: 64 MMHG | SYSTOLIC BLOOD PRESSURE: 141 MMHG

## 2023-12-01 LAB — COLONOSCOPY: NORMAL

## 2023-12-01 PROCEDURE — 45385 COLONOSCOPY W/LESION REMOVAL: CPT | Performed by: COLON & RECTAL SURGERY

## 2023-12-01 PROCEDURE — 88305 TISSUE EXAM BY PATHOLOGIST: CPT | Mod: TC | Performed by: COLON & RECTAL SURGERY

## 2023-12-01 PROCEDURE — 99153 MOD SED SAME PHYS/QHP EA: CPT | Performed by: COLON & RECTAL SURGERY

## 2023-12-01 PROCEDURE — G0500 MOD SEDAT ENDO SERVICE >5YRS: HCPCS | Performed by: COLON & RECTAL SURGERY

## 2023-12-01 PROCEDURE — 250N000011 HC RX IP 250 OP 636: Performed by: COLON & RECTAL SURGERY

## 2023-12-01 PROCEDURE — 45380 COLONOSCOPY AND BIOPSY: CPT | Performed by: COLON & RECTAL SURGERY

## 2023-12-01 RX ORDER — NALOXONE HYDROCHLORIDE 0.4 MG/ML
0.4 INJECTION, SOLUTION INTRAMUSCULAR; INTRAVENOUS; SUBCUTANEOUS
Status: CANCELLED | OUTPATIENT
Start: 2023-12-01

## 2023-12-01 RX ORDER — NALOXONE HYDROCHLORIDE 0.4 MG/ML
0.2 INJECTION, SOLUTION INTRAMUSCULAR; INTRAVENOUS; SUBCUTANEOUS
Status: CANCELLED | OUTPATIENT
Start: 2023-12-01

## 2023-12-01 RX ORDER — ONDANSETRON 2 MG/ML
4 INJECTION INTRAMUSCULAR; INTRAVENOUS EVERY 6 HOURS PRN
Status: CANCELLED | OUTPATIENT
Start: 2023-12-01

## 2023-12-01 RX ORDER — LIDOCAINE 40 MG/G
CREAM TOPICAL
Status: CANCELLED | OUTPATIENT
Start: 2023-12-01

## 2023-12-01 RX ORDER — ONDANSETRON 4 MG/1
4 TABLET, ORALLY DISINTEGRATING ORAL EVERY 6 HOURS PRN
Status: CANCELLED | OUTPATIENT
Start: 2023-12-01

## 2023-12-01 RX ORDER — FLUMAZENIL 0.1 MG/ML
0.2 INJECTION, SOLUTION INTRAVENOUS
Status: CANCELLED | OUTPATIENT
Start: 2023-12-01 | End: 2023-12-02

## 2023-12-01 RX ORDER — PROCHLORPERAZINE MALEATE 5 MG
5 TABLET ORAL EVERY 6 HOURS PRN
Status: CANCELLED | OUTPATIENT
Start: 2023-12-01

## 2023-12-01 RX ORDER — FENTANYL CITRATE 50 UG/ML
INJECTION, SOLUTION INTRAMUSCULAR; INTRAVENOUS PRN
Status: DISCONTINUED | OUTPATIENT
Start: 2023-12-01 | End: 2023-12-01 | Stop reason: HOSPADM

## 2023-12-01 RX ORDER — ONDANSETRON 2 MG/ML
4 INJECTION INTRAMUSCULAR; INTRAVENOUS
Status: CANCELLED | OUTPATIENT
Start: 2023-12-01

## 2023-12-01 ASSESSMENT — ACTIVITIES OF DAILY LIVING (ADL)
ADLS_ACUITY_SCORE: 35
ADLS_ACUITY_SCORE: 35

## 2023-12-01 NOTE — H&P
Pre-Endoscopy History and Physical     Jaguar Tristan MRN# [id number]   YOB: 1940 Age: 82 year old     Date of Procedure: 12/1/2023  Primary care provider: Henrique Boles  Type of Endoscopy: Colonoscopy  Reason for Procedure: Screening  Type of Anesthesia Anticipated: Moderate Sedation    HPI:    Jaguar is a 82 year old male who will be undergoing the above procedure.      A history and physical has been performed. The patient's medications and allergies have been reviewed. The risks and benefits of the procedure and the sedation options and risks were discussed with the patient.  All questions were answered and informed consent was obtained.      He denies a personal or family history of anesthesia complications or bleeding disorders.     Allergies   Allergen Reactions    Other  [No Clinical Screening - See Comments]      PN: LW FI1: NKA LW FI2: nka  PN: LW CM1: Contrast Adverse Reaction - None Reaction :  PN: LW Other1: -NKA        No current facility-administered medications on file prior to encounter.  Alcohol Swabs (ALCOHOL PADS) 70 % PADS, 1 pad 2 times daily For use after pricking finger for checking blood sugar at home. (Patient not taking: Reported on 9/15/2023)  aspirin 81 MG tablet, Take 81 mg by mouth daily (Patient not taking: Reported on 9/15/2023)  atorvastatin (LIPITOR) 80 MG tablet, TAKE 1 TABLET BY MOUTH EVERY DAY  blood glucose (NO BRAND SPECIFIED) lancets standard, Use to test blood sugar 2 times daily as directed. (Patient not taking: Reported on 9/15/2023)  blood glucose monitoring (NO BRAND SPECIFIED) test strip, Use to test blood sugars 2 times daily as directed  Blood Pressure Monitoring KIT, Use to test blood sugars 2 times daily as directed  carvedilol (COREG) 12.5 MG tablet, TAKE 1 TABLET BY MOUTH TWICE A DAY  Glucosamine-Chondroitin (OSTEO BI-FLEX REGULAR STRENGTH PO), Take 1 tablet by mouth as needed   hydrocortisone valerate (WEST-ANA PAULA) 0.2 % ointment, Apply  sparingly to affected area at face two times daily for no more than 14 days.  Icosapent Ethyl 1 g CAPS, Take 2 g by mouth 2 times daily  Leuprolide Acetate (LUPRON IJ),   metFORMIN (GLUCOPHAGE XR) 500 MG 24 hr tablet, TAKE 2 TABLETS BY MOUTH TWICE A DAY WITH MEALS  minocycline (MINOCIN,DYNACIN) 50 MG capsule, Take 50 mg by mouth daily as needed Prn for rosacea (Patient not taking: Reported on 9/15/2023)  Multiple Vitamins-Minerals (PRESERVISION AREDS PO), Take 1 tablet by mouth 2 times daily  nitroglycerin (NITROSTAT) 0.4 MG SL tablet, Place 1 tablet (0.4 mg) under the tongue every 5 minutes as needed for chest pain if you are still having symptoms after 3 doses (15 minutes) call 911. (Patient not taking: Reported on 9/15/2023)  triamcinolone (KENALOG) 0.1 % cream, Apply sparingly to affected area two times daily for no more than 14 days.  Do not apply to (Patient not taking: Reported on 9/15/2023)  TRULICITY 1.5 MG/0.5ML pen, INJECT 1.5 MG SUBCUTANEOUSLY EVERY 7 DAYS  vitamin B complex with vitamin C (STRESS TAB) tablet, Take 1 tablet by mouth daily        Patient Active Problem List   Diagnosis    Benign essential hypertension    Rosacea    Nephrolithiasis    Hyperlipidemia LDL goal <100    Erectile dysfunction    Prostate cancer (H)    History of myocardial infarction    Coronary artery disease involving native coronary artery of native heart without angina pectoris    Type 2 diabetes mellitus with other specified complication, without long-term current use of insulin (H)        Past Medical History:   Diagnosis Date    Coronary artery disease 02/22/2016    Cardiac cath 2016: JAYME x2 to RCA    Diabetes mellitus (H) 2005    Elevated PSA 2009    bx neg Dr. Dill    Erectile dysfunction     History of colonoscopy 2012    nl    Hypercholesteremia 2002    Hypertension 2002    Nephrolithiasis 2007    Prostate cancer (H) 2013    chemotherapy for metastasis to spine 2016 (cleared), prostatectomy and radiation 2013     "Rosacea         Past Surgical History:   Procedure Laterality Date    COLONOSCOPY  1/20/2012    Procedure:COLONOSCOPY; COLONOSCOPY; Surgeon:ELSY GARCIA; Location: GI    CORONARY ANGIOGRAPHY ADULT ORDER      HEART CATH, ANGIOPLASTY  02/22/2016    JAYME x2 to RCA    LEG SURGERY  2005    robotic surgery for prostate cancer  2013    Dr. Dudley    TONSILLECTOMY  5       Social History     Tobacco Use    Smoking status: Never    Smokeless tobacco: Never   Substance Use Topics    Alcohol use: Yes     Alcohol/week: 1.0 - 2.0 standard drink of alcohol     Types: 1 - 2 Glasses of wine per week     Comment: 3-4 glasses wine per week       Family History   Problem Relation Age of Onset    Cancer Mother        REVIEW OF SYSTEMS:     5 point ROS negative except as noted above in HPI, including Gen., Resp., CV, GI &  system review.      PHYSICAL EXAM:   There were no vitals taken for this visit. Estimated body mass index is 31.08 kg/m  as calculated from the following:    Height as of 9/15/23: 1.778 m (5' 10\").    Weight as of 9/15/23: 98.2 kg (216 lb 9.6 oz).   GENERAL APPEARANCE: healthy and alert  MENTAL STATUS: alert  AIRWAY EXAM: Mallampatti Class I (visualization of the soft palate, fauces, uvula, anterior and posterior pillars)  RESP: lungs clear to auscultation - no rales, rhonchi or wheezes  CV: regular rates and rhythm      IMPRESSION   ASA Class 3 - Severe systemic disease, but not incapacitating        PLAN:     Plan for colonoscopy. We discussed the risks, benefits and alternatives and the patient wished to proceed.    The above has been forwarded to the consulting provider.      Naila Maher MD  [facility]  Phone: [phone]  Fax: [phone]  December 1, 2023    " no

## 2023-12-05 ENCOUNTER — ALLIED HEALTH/NURSE VISIT (OUTPATIENT)
Dept: EDUCATION SERVICES | Facility: CLINIC | Age: 83
End: 2023-12-05
Payer: MEDICARE

## 2023-12-05 DIAGNOSIS — E11.69 TYPE 2 DIABETES MELLITUS WITH OTHER SPECIFIED COMPLICATION, WITHOUT LONG-TERM CURRENT USE OF INSULIN (H): Primary | ICD-10-CM

## 2023-12-05 LAB
PATH REPORT.COMMENTS IMP SPEC: NORMAL
PATH REPORT.COMMENTS IMP SPEC: NORMAL
PATH REPORT.FINAL DX SPEC: NORMAL
PATH REPORT.GROSS SPEC: NORMAL
PATH REPORT.MICROSCOPIC SPEC OTHER STN: NORMAL
PATH REPORT.RELEVANT HX SPEC: NORMAL
PHOTO IMAGE: NORMAL

## 2023-12-05 PROCEDURE — 99207 PR NO CHARGE LOS: CPT | Performed by: DIETITIAN, REGISTERED

## 2023-12-05 PROCEDURE — 88305 TISSUE EXAM BY PATHOLOGIST: CPT | Mod: 26 | Performed by: PATHOLOGY

## 2023-12-05 NOTE — PROGRESS NOTES
Diabetes Self-Management Education & Support    Presents for: Follow-up    Type of Service: In Person Visit      ASSESSMENT:  Jaguar comes in for blood sugar log review. Is testing blood sugars 1-2 times daily. Has seen improvement after being more diligent with meds, adjusting diet, increasing exercise. He notices how much food does affect the numbers and is working to continue to make changes. Reviewed diabetes care goals as well as importance of continued healthy lifestyle - diet & exercise. Patient has no other questions today.     Patient's most recent   Lab Results   Component Value Date    A1C 8.0 09/15/2023    A1C 8.9 05/25/2021     is not meeting goal of <7.0    Diabetes knowledge and skills assessment:   Patient is knowledgeable in diabetes management concepts related to: Monitoring    Continue education with the following diabetes management concepts: Healthy Eating, Being Active, Monitoring, Taking Medication, Problem Solving, Reducing Risks, and Healthy Coping    Based on learning assessment above, most appropriate setting for further diabetes education would be: Group class or Individual setting.      PLAN    Get A1C rechecked every 3-6 months, orders placed for updated A1C after 12/15  Continue current med regimen  Continue to check blood sugars once daily     Topics to cover at upcoming visits: Healthy Eating, Being Active, Monitoring, Taking Medication, Problem Solving, Reducing Risks, and Healthy Coping    Follow-up: annually or sooner as needed     See Care Plan for co-developed, patient-state behavior change goals.  AVS provided for patient today.    Education Materials Provided:  Goals for Your Diabetes Care      SUBJECTIVE/OBJECTIVE:  Presents for: Follow-up  Accompanied by: Self  Diabetes education in the past 24mo: Yes  Focus of Visit: Taking Medication  Diabetes type: Type 2  Disease course: Improving  How confident are you filling out medical forms by yourself:: Not Assessed  Diabetes  "management related comments/concerns: Things are improving, checking bgs daily  Transportation concerns: No  Difficulty affording diabetes medication?: Sometimes  Difficulty affording diabetes testing supplies?: No  Other concerns:: None  Cultural Influences/Ethnic Background:  Not  or     Diabetes Symptoms & Complications:  Weight trend: Decreasing  Complications assessed today?: No    Patient Problem List and Family Medical History reviewed for relevant medical history, current medical status, and diabetes risk factors.    Vitals:  There were no vitals taken for this visit.  Estimated body mass index is 31.08 kg/m  as calculated from the following:    Height as of 9/15/23: 1.778 m (5' 10\").    Weight as of 9/15/23: 98.2 kg (216 lb 9.6 oz).   Last 3 BP:   BP Readings from Last 3 Encounters:   12/01/23 (!) 141/64   09/15/23 118/74   03/08/22 116/68       History   Smoking Status    Never   Smokeless Tobacco    Never       Labs:  Lab Results   Component Value Date    A1C 8.0 09/15/2023    A1C 8.9 05/25/2021     Lab Results   Component Value Date     09/15/2023     03/07/2022     05/25/2021     Lab Results   Component Value Date    LDL 44 09/15/2023    LDL 61 11/17/2020     HDL Cholesterol   Date Value Ref Range Status   11/17/2020 40 >39 mg/dL Final     Direct Measure HDL   Date Value Ref Range Status   09/15/2023 35 (L) >=40 mg/dL Final   ]  GFR Estimate   Date Value Ref Range Status   09/15/2023 81 >60 mL/min/1.73m2 Final   05/25/2021 83 >60 mL/min/[1.73_m2] Final     Comment:     Non  GFR Calc  Starting 12/18/2018, serum creatinine based estimated GFR (eGFR) will be   calculated using the Chronic Kidney Disease Epidemiology Collaboration   (CKD-EPI) equation.       GFR Estimate If Black   Date Value Ref Range Status   05/25/2021 >90 >60 mL/min/[1.73_m2] Final     Comment:      GFR Calc  Starting 12/18/2018, serum creatinine based estimated GFR " "(eGFR) will be   calculated using the Chronic Kidney Disease Epidemiology Collaboration   (CKD-EPI) equation.       Lab Results   Component Value Date    CR 0.95 09/15/2023    CR 0.86 05/25/2021     No results found for: \"MICROALBUMIN\"    Healthy Eating:  Healthy Eating Assessed Today: Yes  Cultural/Alevism diet restrictions?: No  Meal planning/habits: Smaller portions  Meals include: Breakfast, Lunch, Dinner  Breakfast: pumpkin pastry, juice  Lunch: salad OR sandwich  Dinner: biggest meal of the day  Other: Trying to be mindful of food choices, notices higher numbers when he eats higher carb foods  Has patient met with a dietitian in the past?: Yes    Being Active:  Being Active Assessed Today: Yes  Exercise:: Yes  How intense was your typical exercise? : Moderate (like brisk walking) ([employer])  Barrier to exercise: None    Monitoring:  Monitoring Assessed Today: Yes  Blood Glucose Meter: Unknown  Times checking blood sugar at home (number): 1  Times checking blood sugar at home (per): Day  Blood glucose trend: Decreasing          Taking Medications:  Diabetes Medication(s)       Biguanides       metFORMIN (GLUCOPHAGE XR) 500 MG 24 hr tablet    TAKE 2 TABLETS BY MOUTH TWICE A DAY WITH MEALS      Sulfonylureas       glimepiride (AMARYL) 4 MG tablet    TAKE 2 TABLETS BY MOUTH EVERY DAY IN THE MORNING BEFORE BREAKFAST      Incretin Mimetic Agents       TRULICITY 1.5 MG/0.5ML pen    INJECT 1.5 MG SUBCUTANEOUSLY EVERY 7 DAYS            Taking Medication Assessed Today: Yes  Current Treatments: Non-insulin Injectables, Oral Medication (taken by mouth)  Problems taking diabetes medications regularly?: No  Diabetes medication side effects?: No    Problem Solving:  Problem Solving Assessed Today: No  Is the patient at risk for hypoglycemia?: Yes  Hypoglycemia Frequency: Never  Medical ID: No  Does patient have glucagon emergency kit?: No  Is the patient at risk for DKA?: No  Does patient have severe " weather/disaster plan for diabetes management?: No  Does patient have sick day plan for diabetes management?: No              Reducing Risks:  Reducing Risks Assessed Today: Yes  Diabetes Risks: Age over 45 years, Sedentary Lifestyle  CAD Risks: Diabetes Mellitus, Sedentary lifestyle, Male sex  Has dilated eye exam at least once a year?: Yes    Healthy Coping:  Healthy Coping Assessed Today: Yes  Emotional response to diabetes: Ready to learn  Informal Support system:: Spouse  Stage of change: ACTION (Actively working towards change)  Support resources: None  Patient Activation Measure Survey Score:      2/1/2013     2:00 PM   LOUIE Score (Last Two)   LOUIE Raw Score 39   Activation Score 56.4   LOUIE Level 3         Care Plan and Education Provided:  Care Plan: Diabetes   Updates made by Norma Caldwell RD since 12/5/2023 12:00 AM        Problem: Diabetes Self-Management Education Needed to Optimize Self-Care Behaviors         Goal: Monitoring - monitor glucose and ketones as directed    This Visit's Progress: 100%   Recent Progress: 0%   Note:    I will check blood sugars at least once daily until follow up appointment.        Goal: Reducing Risks - know how to prevent and treat long-term diabetes complications         Task: Provide education on major complications of diabetes, prevention, early diagnostic measures and treatment of complications Completed 12/5/2023   Responsible User: Norma Caldwell RD        Task: Provide education on recommended care for dental, eye and foot health Completed 12/5/2023   Responsible User: Norma Caldwell RD        Task: Provide education on recommendations for heart health - lipid levels and goals, blood pressure and goals, and aspirin therapy, if indicated Completed 12/5/2023   Responsible User: Norma Caldwell RD Elise Graham, RD, BELA, [facility]     Time Spent: 20 minutes  Encounter Type: Individual    Any diabetes medication dose changes were made via the CDE Protocol per the  patient's referring provider. A copy of this encounter was shared with the provider.

## 2023-12-05 NOTE — LETTER
12/5/2023         RE: Jaguar Tristan  [address] [address]        Dear Colleague,    Thank you for referring your patient, Jaguar Tristan, to the [facility] [place]. Please see a copy of my visit note below.    Diabetes Self-Management Education & Support    Presents for: Follow-up    Type of Service: In Person Visit      ASSESSMENT:  Jaguar comes in for blood sugar log review. Is testing blood sugars 1-2 times daily. Has seen improvement after being more diligent with meds, adjusting diet, increasing exercise. He notices how much food does affect the numbers and is working to continue to make changes. Reviewed diabetes care goals as well as importance of continued healthy lifestyle - diet & exercise. Patient has no other questions today.     Patient's most recent   Lab Results   Component Value Date    A1C 8.0 09/15/2023    A1C 8.9 05/25/2021     is not meeting goal of <7.0    Diabetes knowledge and skills assessment:   Patient is knowledgeable in diabetes management concepts related to: Monitoring    Continue education with the following diabetes management concepts: Healthy Eating, Being Active, Monitoring, Taking Medication, Problem Solving, Reducing Risks, and Healthy Coping    Based on learning assessment above, most appropriate setting for further diabetes education would be: Group class or Individual setting.      PLAN    Get A1C rechecked every 3-6 months, orders placed for updated A1C after 12/15  Continue current med regimen  Continue to check blood sugars once daily     Topics to cover at upcoming visits: Healthy Eating, Being Active, Monitoring, Taking Medication, Problem Solving, Reducing Risks, and Healthy Coping    Follow-up: annually or sooner as needed     See Care Plan for co-developed, patient-state behavior change goals.  AVS provided for patient today.    Education Materials Provided:  Goals for Your Diabetes  "Care      SUBJECTIVE/OBJECTIVE:  Presents for: Follow-up  Accompanied by: Self  Diabetes education in the past 24mo: Yes  Focus of Visit: Taking Medication  Diabetes type: Type 2  Disease course: Improving  How confident are you filling out medical forms by yourself:: Not Assessed  Diabetes management related comments/concerns: Things are improving, checking bgs daily  Transportation concerns: No  Difficulty affording diabetes medication?: Sometimes  Difficulty affording diabetes testing supplies?: No  Other concerns:: None  Cultural Influences/Ethnic Background:  Not  or     Diabetes Symptoms & Complications:  Weight trend: Decreasing  Complications assessed today?: No    Patient Problem List and Family Medical History reviewed for relevant medical history, current medical status, and diabetes risk factors.    Vitals:  There were no vitals taken for this visit.  Estimated body mass index is 31.08 kg/m  as calculated from the following:    Height as of 9/15/23: 1.778 m (5' 10\").    Weight as of 9/15/23: 98.2 kg (216 lb 9.6 oz).   Last 3 BP:   BP Readings from Last 3 Encounters:   12/01/23 (!) 141/64   09/15/23 118/74   03/08/22 116/68       History   Smoking Status     Never   Smokeless Tobacco     Never       Labs:  Lab Results   Component Value Date    A1C 8.0 09/15/2023    A1C 8.9 05/25/2021     Lab Results   Component Value Date     09/15/2023     03/07/2022     05/25/2021     Lab Results   Component Value Date    LDL 44 09/15/2023    LDL 61 11/17/2020     HDL Cholesterol   Date Value Ref Range Status   11/17/2020 40 >39 mg/dL Final     Direct Measure HDL   Date Value Ref Range Status   09/15/2023 35 (L) >=40 mg/dL Final   ]  GFR Estimate   Date Value Ref Range Status   09/15/2023 81 >60 mL/min/1.73m2 Final   05/25/2021 83 >60 mL/min/[1.73_m2] Final     Comment:     Non  GFR Calc  Starting 12/18/2018, serum creatinine based estimated GFR (eGFR) will be " "  calculated using the Chronic Kidney Disease Epidemiology Collaboration   (CKD-EPI) equation.       GFR Estimate If Black   Date Value Ref Range Status   05/25/2021 >90 >60 mL/min/[1.73_m2] Final     Comment:      GFR Calc  Starting 12/18/2018, serum creatinine based estimated GFR (eGFR) will be   calculated using the Chronic Kidney Disease Epidemiology Collaboration   (CKD-EPI) equation.       Lab Results   Component Value Date    CR 0.95 09/15/2023    CR 0.86 05/25/2021     No results found for: \"MICROALBUMIN\"    Healthy Eating:  Healthy Eating Assessed Today: Yes  Cultural/Zoroastrianism diet restrictions?: No  Meal planning/habits: Smaller portions  Meals include: Breakfast, Lunch, Dinner  Breakfast: pumpkin pastry, juice  Lunch: salad OR sandwich  Dinner: biggest meal of the day  Other: Trying to be mindful of food choices, notices higher numbers when he eats higher carb foods  Has patient met with a dietitian in the past?: Yes    Being Active:  Being Active Assessed Today: Yes  Exercise:: Yes  How intense was your typical exercise? : Moderate (like brisk walking) ([employer])  Barrier to exercise: None    Monitoring:  Monitoring Assessed Today: Yes  Blood Glucose Meter: Unknown  Times checking blood sugar at home (number): 1  Times checking blood sugar at home (per): Day  Blood glucose trend: Decreasing          Taking Medications:  Diabetes Medication(s)       Biguanides       metFORMIN (GLUCOPHAGE XR) 500 MG 24 hr tablet    TAKE 2 TABLETS BY MOUTH TWICE A DAY WITH MEALS      Sulfonylureas       glimepiride (AMARYL) 4 MG tablet    TAKE 2 TABLETS BY MOUTH EVERY DAY IN THE MORNING BEFORE BREAKFAST      Incretin Mimetic Agents       TRULICITY 1.5 MG/0.5ML pen    INJECT 1.5 MG SUBCUTANEOUSLY EVERY 7 DAYS            Taking Medication Assessed Today: Yes  Current Treatments: Non-insulin Injectables, Oral Medication (taken by mouth)  Problems taking diabetes medications regularly?: No  Diabetes " medication side effects?: No    Problem Solving:  Problem Solving Assessed Today: No  Is the patient at risk for hypoglycemia?: Yes  Hypoglycemia Frequency: Never  Medical ID: No  Does patient have glucagon emergency kit?: No  Is the patient at risk for DKA?: No  Does patient have severe weather/disaster plan for diabetes management?: No  Does patient have sick day plan for diabetes management?: No              Reducing Risks:  Reducing Risks Assessed Today: Yes  Diabetes Risks: Age over 45 years, Sedentary Lifestyle  CAD Risks: Diabetes Mellitus, Sedentary lifestyle, Male sex  Has dilated eye exam at least once a year?: Yes    Healthy Coping:  Healthy Coping Assessed Today: Yes  Emotional response to diabetes: Ready to learn  Informal Support system:: Spouse  Stage of change: ACTION (Actively working towards change)  Support resources: None  Patient Activation Measure Survey Score:      2/1/2013     2:00 PM   LOUIE Score (Last Two)   LOUIE Raw Score 39   Activation Score 56.4   LOUIE Level 3         Care Plan and Education Provided:  Care Plan: Diabetes   Updates made by Norma Caldwell RD since 12/5/2023 12:00 AM        Problem: Diabetes Self-Management Education Needed to Optimize Self-Care Behaviors         Goal: Monitoring - monitor glucose and ketones as directed    This Visit's Progress: 100%   Recent Progress: 0%   Note:    I will check blood sugars at least once daily until follow up appointment.        Goal: Reducing Risks - know how to prevent and treat long-term diabetes complications         Task: Provide education on major complications of diabetes, prevention, early diagnostic measures and treatment of complications Completed 12/5/2023   Responsible User: Norma Caldwell RD        Task: Provide education on recommended care for dental, eye and foot health Completed 12/5/2023   Responsible User: Norma Caldwell RD        Task: Provide education on recommendations for heart health - lipid levels and goals, blood  pressure and goals, and aspirin therapy, if indicated Completed 12/5/2023   Responsible User: Norma Caldwell RD Elise Graham, RD, LD, [facility]     Time Spent: 20 minutes  Encounter Type: Individual    Any diabetes medication dose changes were made via the CDE Protocol per the patient's referring provider. A copy of this encounter was shared with the provider.

## 2023-12-13 ENCOUNTER — TRANSFERRED RECORDS (OUTPATIENT)
Dept: HEALTH INFORMATION MANAGEMENT | Facility: CLINIC | Age: 83
End: 2023-12-13
Payer: MEDICARE

## 2024-01-10 DIAGNOSIS — E11.69 TYPE 2 DIABETES MELLITUS WITH OTHER SPECIFIED COMPLICATION, WITHOUT LONG-TERM CURRENT USE OF INSULIN (H): ICD-10-CM

## 2024-01-11 RX ORDER — GLIMEPIRIDE 4 MG/1
TABLET ORAL
Qty: 180 TABLET | Refills: 0 | Status: SHIPPED | OUTPATIENT
Start: 2024-01-11 | End: 2024-04-08

## 2024-01-11 NOTE — TELEPHONE ENCOUNTER
Prescription approved per [facility] Refill Protocol.  Thania Bennett, RN  [facility] Triage Nurse

## 2024-03-03 DIAGNOSIS — E11.69 TYPE 2 DIABETES MELLITUS WITH OTHER SPECIFIED COMPLICATION, WITHOUT LONG-TERM CURRENT USE OF INSULIN (H): ICD-10-CM

## 2024-03-04 RX ORDER — DULAGLUTIDE 1.5 MG/.5ML
INJECTION, SOLUTION SUBCUTANEOUS
Qty: 6 ML | Refills: 0 | Status: SHIPPED | OUTPATIENT
Start: 2024-03-04 | End: 2024-05-20

## 2024-03-24 DIAGNOSIS — E78.5 HYPERLIPIDEMIA LDL GOAL <100: ICD-10-CM

## 2024-03-25 RX ORDER — ATORVASTATIN CALCIUM 80 MG/1
TABLET, FILM COATED ORAL
Qty: 90 TABLET | Refills: 1 | Status: SHIPPED | OUTPATIENT
Start: 2024-03-25 | End: 2024-09-30

## 2024-04-02 ENCOUNTER — TELEPHONE (OUTPATIENT)
Dept: FAMILY MEDICINE | Facility: CLINIC | Age: 84
End: 2024-04-02
Payer: MEDICARE

## 2024-04-02 NOTE — TELEPHONE ENCOUNTER
Spoke with Garrett this morning - encouraged him to call around to pharmacies to find his dose. He is wondering if he should r/s follow up with PCP and I recommended he keep his appointment. We will know if his A1C is higher that it's likely the result of missing Trulicity doses. If diabetes ed follow up is needed, I recommended he ask Dr. Boles for new referral and then to follow up with me after that. I provided our scheduling line for calls as needed as well.     Norma Caldwell RD, LD, [facility]

## 2024-04-02 NOTE — TELEPHONE ENCOUNTER
Evelio Green,    Patient contacted clinic requesting that this Writer ask you to contact him. He wanted to chat with you about being unable to acquire Trulicity d/t high demand and that he has been without Trulicity for 3-4 weeks.     Thanks,  SHANNON Cortez

## 2024-04-05 ENCOUNTER — OFFICE VISIT (OUTPATIENT)
Dept: FAMILY MEDICINE | Facility: CLINIC | Age: 84
End: 2024-04-05
Payer: MEDICARE

## 2024-04-05 VITALS
HEIGHT: 70 IN | TEMPERATURE: 98.1 F | OXYGEN SATURATION: 98 % | HEART RATE: 61 BPM | RESPIRATION RATE: 18 BRPM | BODY MASS INDEX: 31.15 KG/M2 | WEIGHT: 217.6 LBS | SYSTOLIC BLOOD PRESSURE: 131 MMHG | DIASTOLIC BLOOD PRESSURE: 67 MMHG

## 2024-04-05 DIAGNOSIS — E78.5 HYPERLIPIDEMIA LDL GOAL <100: ICD-10-CM

## 2024-04-05 DIAGNOSIS — E66.811 CLASS 1 OBESITY WITH SERIOUS COMORBIDITY AND BODY MASS INDEX (BMI) OF 31.0 TO 31.9 IN ADULT, UNSPECIFIED OBESITY TYPE: ICD-10-CM

## 2024-04-05 DIAGNOSIS — I25.10 CORONARY ARTERY DISEASE INVOLVING NATIVE CORONARY ARTERY OF NATIVE HEART WITHOUT ANGINA PECTORIS: ICD-10-CM

## 2024-04-05 DIAGNOSIS — I25.2 HISTORY OF MYOCARDIAL INFARCTION: ICD-10-CM

## 2024-04-05 DIAGNOSIS — C61 PROSTATE CANCER (H): ICD-10-CM

## 2024-04-05 DIAGNOSIS — Z86.0100 HISTORY OF COLONIC POLYPS: ICD-10-CM

## 2024-04-05 DIAGNOSIS — E11.69 TYPE 2 DIABETES MELLITUS WITH OTHER SPECIFIED COMPLICATION, WITHOUT LONG-TERM CURRENT USE OF INSULIN (H): Primary | ICD-10-CM

## 2024-04-05 DIAGNOSIS — I10 BENIGN ESSENTIAL HYPERTENSION: ICD-10-CM

## 2024-04-05 LAB — HBA1C MFR BLD: 7.9 % (ref 0–5.6)

## 2024-04-05 PROCEDURE — 80061 LIPID PANEL: CPT | Performed by: INTERNAL MEDICINE

## 2024-04-05 PROCEDURE — 83036 HEMOGLOBIN GLYCOSYLATED A1C: CPT | Performed by: INTERNAL MEDICINE

## 2024-04-05 PROCEDURE — 36415 COLL VENOUS BLD VENIPUNCTURE: CPT | Performed by: INTERNAL MEDICINE

## 2024-04-05 PROCEDURE — 82043 UR ALBUMIN QUANTITATIVE: CPT | Performed by: INTERNAL MEDICINE

## 2024-04-05 PROCEDURE — 99214 OFFICE O/P EST MOD 30 MIN: CPT | Performed by: INTERNAL MEDICINE

## 2024-04-05 PROCEDURE — 80053 COMPREHEN METABOLIC PANEL: CPT | Performed by: INTERNAL MEDICINE

## 2024-04-05 PROCEDURE — 82570 ASSAY OF URINE CREATININE: CPT | Performed by: INTERNAL MEDICINE

## 2024-04-05 RX ORDER — RESPIRATORY SYNCYTIAL VIRUS VACCINE 120MCG/0.5
0.5 KIT INTRAMUSCULAR ONCE
Qty: 1 EACH | Refills: 0 | Status: CANCELLED | OUTPATIENT
Start: 2024-04-05 | End: 2024-04-05

## 2024-04-05 ASSESSMENT — PAIN SCALES - GENERAL: PAINLEVEL: NO PAIN (0)

## 2024-04-05 NOTE — TELEPHONE ENCOUNTER
[facility] Call Center    Phone Message    May a detailed message be left on voicemail: yes     Reason for Call: Patient calling back to touch base with Norma. Please call patient back to discuss further.      Action Taken: Message routed to:  [facility] ([facility]): Diab Ed

## 2024-04-05 NOTE — RESULT ENCOUNTER NOTE
Evelio Montesinos-reviewed some of your labs ,  Test for diabetes HbA1c still slightly elevated 7.9 please follow-up with the diabetic educator.-  I will place an order for referral to endocrinology as well.  Regards,  Dr Boles

## 2024-04-05 NOTE — Clinical Note
Please abstract the following data from this visit with this patient into the appropriate field in Epic:  Tests that can be patient reported without a hard copy:  Eye exam with ophthalmology on this date[facility]

## 2024-04-05 NOTE — PROGRESS NOTES
Assessment & Plan   Problem List Items Addressed This Visit       Benign essential hypertension    Relevant Orders    Comprehensive metabolic panel (BMP + Alb, Alk Phos, ALT, AST, Total. Bili, TP) (Completed)    Albumin Random Urine Quantitative with Creat Ratio (Completed)    Med Therapy Management Referral    Hyperlipidemia LDL goal <100    Relevant Orders    Lipid panel reflex to direct LDL Fasting (Completed)    Med Therapy Management Referral    Prostate cancer (H)    Relevant Orders    Med Therapy Management Referral    History of myocardial infarction    Relevant Orders    Med Therapy Management Referral    Coronary artery disease involving native coronary artery of native heart without angina pectoris    Relevant Orders    Med Therapy Management Referral    Type 2 diabetes mellitus with other specified complication, without long-term current use of insulin (H) - Primary    Relevant Orders    Hemoglobin A1c (Completed)    Comprehensive metabolic panel (BMP + Alb, Alk Phos, ALT, AST, Total. Bili, TP) (Completed)    Med Therapy Management Referral    Adult Endocrinology  Referral     Other Visit Diagnoses       Class 1 obesity with serious comorbidity and body mass index (BMI) of 31.0 to 31.9 in adult, unspecified obesity type        On GLP-1 agonist. Follow-up with MTM.    History of colonic polyps               Continue follow-up with specialty recommendation including urology and cardiology.  Continue follow-up with diabetic educator, establish with MTM as well.  Referral to endocrinology.  Continue same medication started on Ozempic as per diabetic educator recommendation.  Continue with lifestyle changes low-carb diet low-cholesterol low saturated fat increase exercise activities as tolerated weight loss strongly recommended.  Hemodynamics are stable.  Colonoscopy in 20 2024.  Adenomatous polyp.  Due for Shingrix vaccine, updated Td vaccine and RSV vaccine.       BMI  Estimated body mass index  "is 31.22 kg/m  as calculated from the following:    Height as of this encounter: 1.778 m (5' 10\").    Weight as of this encounter: 98.7 kg (217 lb 9.6 oz).   Weight management plan: Discussed healthy diet and exercise guidelines    CONSULTATION/REFERRAL to MTM, endocrinology  Work on weight loss  Regular exercise  See Patient Instructions      Queenie Montesinos is a 83 year old, presenting for the following health issues:  Diabetes (Patient is here for a diabetic check.  ) and Health Maintenance (Eye Exam done on 11/07/2023 at [facility], abstracted.)        4/5/2024     1:33 PM   Additional Questions   Roomed by Andrew BAKER MA   Accompanied by GERALDINE     Via the Health Maintenance questionnaire, the patient has reported the following services have been completed -Eye Exam, this information has been sent to the abstraction team.  History of Present Illness       Diabetes:   He presents for follow up of diabetes.  He is checking home blood glucose a few times a week.   He checks blood glucose before meals.  Blood glucose is sometimes over 200 and never under 70.     He has no concerns regarding his diabetes at this time.   He is not experiencing numbness or burning in feet, excessive thirst, blurry vision, weight changes or redness, sores or blisters on feet. The patient has had a diabetic eye exam in the last 12 months. Eye exam performed on 5 months ago. Location of last eye exam na.        He eats 2-3 servings of fruits and vegetables daily.He consumes 0 sweetened beverage(s) daily.He exercises with enough effort to increase his heart rate 10 to 19 minutes per day.  He exercises with enough effort to increase his heart rate 4 days per week. He is missing 3 dose(s) of medications per week.      Patient presenting for follow-up he has run out of Trulicity not available in the pharmacy switching to Ozempic.  Is still physically active works in the as a  in the [employer].  Denies any chest pain dyspnea " palpitations presyncope or syncope no GI or  symptoms.  Follows with urology as a cardiology also had seen in the past and is seeing ophthalmology up-to-date on colonoscopy last showed adenomatous polyps due for repeat in 12/20/2024.    Urology notes:  #1 Primary Malignant Neoplasm Of Prostate (HCC)  #2 Rising Prostate Specific Antigen Following Treatment For Malignant Cancer Of Prostate  #3 Secondary Malignant Neoplasm Bone (HCC)    It was a pleasure to see Mr. Jaguar Tristan in clinic today in conjunction with Dr. Del Castillo. Together we reviewed and interpreted his recent imaging and labs. I was happy to inform him that his PSA remains undectable. Alkaline phosphatase is 154 from prior 136. C11 PET choline scan does not reveal any choline avid evidence of recurrent or metastatic disease. The faint uptake on the left common iliac and right-sided mesenteric nodes are felt to be more reactive in nature. He will remain off ADT.   Cardiology notes:  Mr. Jaguar Tristan was instructed to return for follow up appointment in 6 months to perform the following laboratory tests: PSA, Testosterone, and Alkaline phosphatase. Patient will undergo a C11-choline PET to identify sites of prostate cancer relapse after failed treatment, per medical necessity and per FDA, NCCN and CMS guidelines.   IMPRESSION:    1.  Coronary artery disease and prior non-Q-wave myocardial infarct and stenting of the distal right coronary artery.  The patient is asymptomatic with respect to coronary artery disease with no symptoms suggestive of angina pectoris.  2.  Mixed hyperlipidemia.  Could not tolerate Vascepa because of diarrhea.  Triglycerides were mildly raised but the other components of his lipid profile are excellent.  3.  Essential hypertension.  His blood pressure is well controlled.  4.  Metastatic prostate cancer.  At present, he seems to have all clear from radiological and serological studies.     PLAN:  We will continue the patient on  "his present medications.  I have encouraged him to exercise and to reduce his carbohydrate intake and to lose weight which should help his triglycerides and improve his HDL.  I will see him back again in 1 year's time and at that stage, I will repeat his lipids and basic metabolic profile.  I wish him the best with his prostate cancer.  I look forward to seeing him again in 1 year's time    Ophthalmology notes, T next here is possibly a small plaque in a retinal vessel in your right eye: Plaques increase your risk for developing a stroke. If any transient loss of vision, shortness of breath or tingling in your left arm occurs go to ER ASAP.   -Continue to monitor systemic health: cholesterol, blood pressure and blood sugar with primary medical doctor (PMD).     Review of Systems  Constitutional, neuro, ENT, endocrine, pulmonary, cardiac, gastrointestinal, genitourinary, musculoskeletal, integument and psychiatric systems are negative, except as otherwise noted.      Objective    /67 (BP Location: Right arm, Patient Position: Sitting, Cuff Size: Adult Large)   Pulse 61   Temp 98.1  F (36.7  C) (Temporal)   Resp 18   Ht 1.778 m (5' 10\")   Wt 98.7 kg (217 lb 9.6 oz)   SpO2 98%   BMI 31.22 kg/m    Body mass index is 31.22 kg/m .  Physical Exam   GENERAL: alert and no distress  EYES: Eyes grossly normal to inspection, PERRL and conjunctivae and sclerae normal  HENT: ear canals and TM's normal, nose and mouth without ulcers or lesions  NECK: no adenopathy, no asymmetry, masses, or scars  RESP: lungs clear to auscultation - no rales, rhonchi or wheezes  CV: regular rate and rhythm, normal S1 S2, no S3 or S4, no murmur, click or rub, no peripheral edema  ABDOMEN: soft, nontender, no hepatosplenomegaly, no masses and bowel sounds normal  MS: no gross musculoskeletal defects noted, no edema  SKIN: no suspicious lesions or rashes  NEURO: Normal strength and tone, mentation intact and speech normal  PSYCH: " mentation appears normal, affect normal/bright    Admission on 12/01/2023, Discharged on 12/01/2023   Component Date Value Ref Range Status    Case Report 12/01/2023    Final                    Value:Surgical Pathology Report                         Case: [id number]                                  Authorizing Provider:  Erin Maher MD   Collected:           12/01/2023 03:29 PM          Ordering Location:     [facility]          Received:            12/04/2023 06:20 AM                                 [facility]                                                          Pathologist:           Agustina Stovall MD                                                                           Specimens:   A) - Large Intestine, Colon, Cecum                                                                  B) - Large Intestine, Colon, Ascending, 2 polyps                                                    C) - Large Intestine, Colon, Transverse, 2 polyps                                                   D) - Large Intestine, Colon, Descending                                                                                       E) - Rectum, 4 polyps                                                                      Final Diagnosis 12/01/2023    Final                    Value:This result contains rich text formatting which cannot be displayed here.    Clinical Information 12/01/2023    Final                    Value:This result contains rich text formatting which cannot be displayed here.    Gross Description 12/01/2023    Final                    Value:This result contains rich text formatting which cannot be displayed here.    Microscopic Description 12/01/2023    Final                    Value:This result contains rich text formatting which cannot be displayed here.    Performing Labs 12/01/2023    Final                     Value:This result contains rich text formatting which cannot be displayed here.    COLONOSCOPY 12/01/2023    Final                    Value:[facility]  [address] [place]  [address]  _______________________________________________________________________________  Patient Name: Jaguar White            Procedure Date: 12/1/2023 2:25 PM  MRN: [id number]                       Account Number: [id number]  YOB: 1940             Admit Type: Outpatient  Age: 82                               Room: 2  Note Status: Finalized                Attending MD: BENOIT BRADY MD,   Instrument Name: 522 [id number] Adult Colon   _______________________________________________________________________________     Procedure:                Colonoscopy  Indications:              Screening for colorectal malignant neoplasm  Providers:                BENOIT BRADY MD, Camila Florian RN  Referring MD:               Medicines:                Midazolam 1 mg IV, Fentanyl 100 micrograms IV  Complications:            No immediate complications.  _______________________________________________________                          ________________________  Procedure:                Pre-Anesthesia Assessment:                            - Prior to the procedure, a History and Physical                             was performed, and patient medications and                             allergies were reviewed. The patient's tolerance of                             previous anesthesia was also reviewed. The risks                             and benefits of the procedure and the sedation                             options and risks were discussed with the patient.                             All questions were answered, and informed consent                             was obtained. Prior Anticoagulants: The patient has                             taken no anticoagulant or antiplatelet agents. ASA                              Grade Assessment: II - A patient with mild systemic                             disease. After reviewing the risks and benefits,                             the patient was deemed in satisfactory co                          ndition to                             undergo the procedure.                            - Prior to the procedure, a History and Physical                             was performed, and patient medications, allergies                             and sensitivities were reviewed. The patient's                             tolerance of previous anesthesia was reviewed.                            - The risks and benefits of the procedure and the                             sedation options and risks were discussed with the                             patient. All questions were answered and informed                             consent was obtained.                            - Patient identification and proposed procedure                             were verified prior to the procedure by the                             physician. The procedure was verified in the                             endoscopy suite.                            - Pre-procedure physical examination revealed no                                                       contraindications to sedation.                            - The heart rate, respiratory rate, oxygen                             saturations, blood pressure, adequacy of pulmonary                             ventilation, and response to care were monitored                             throughout the procedure.                            - The physical status of the patient was                             re-assessed after the procedure.                            After obtaining informed consent, the colonoscope                             was passed under direct vision. Throughout the                             procedure, the patient's blood pressure,  pulse, and                             oxygen saturations were monitored continuously. The                             522 was introduced through the anus and advanced to                             the cecum, identified by appendiceal orifice and                             ileocecal valve. The colonoscopy was somewha                          t                             difficult due to a redundant colon. Successful                             completion of the procedure was aided by changing                             the patient to a supine position and using manual                             pressure. The patient tolerated the procedure well.                             The quality of the bowel preparation was adequate                             to identify polyps greater than 5 mm in size.                                                                                   Findings:       The perianal and digital rectal examinations were normal. Pertinent        negatives include normal sphincter tone and no palpable rectal lesions.       A 4 mm polyp was found in the cecum. The polyp was sessile. The polyp        was removed with a jumbo cold forceps. Resection and retrieval were        complete. Estimated blood loss was minimal.       Two sessile polyps were found in the ascending colon. The polyps were 3        to                           4 mm in size. These polyps were removed with a jumbo cold forceps.        Resection and retrieval were complete. Estimated blood loss was minimal.       Two sessile polyps were found in the transverse colon. The polyps were 3        to 5 mm in size. These polyps were removed with a jumbo cold forceps.        Resection and retrieval were complete. Estimated blood loss was minimal.       A 5 mm polyp was found in the descending colon. The polyp was sessile.        The polyp was removed with a cold snare. Resection and retrieval were        complete. Estimated blood loss  was minimal.       Four sessile polyps were found in the rectum. The polyps were 4 to 5 mm        in size. These polyps were removed with a cold snare. Resection and        retrieval were complete. Estimated blood loss was minimal.       Multiple small and large-mouthed diverticula were found in the sigmoid        colon, descending colon, transverse colon and ascending colon.       Retroflexion in the rectum was not perfo                          rmed due to anatomy.                                                                                   Impression:               - One 4 mm polyp in the cecum, removed with a jumbo                             cold forceps. Resected and retrieved.                            - Two 3 to 4 mm polyps in the ascending colon,                             removed with a jumbo cold forceps. Resected and                             retrieved.                            - Two 3 to 5 mm polyps in the transverse colon,                             removed with a jumbo cold forceps. Resected and                             retrieved.                            - One 5 mm polyp in the descending colon, removed                             with a cold snare. Resected and retrieved.                            - Four 4 to 5 mm polyps in the rectum, removed with                             a cold snare. Resected and retrieved.                            - Diverticulosis in the sigmoid colon, in the                                                       descending colon, in the transverse colon and in                             the ascending colon.  Recommendation:           - Discharge patient to home.                            - Resume previous diet.                            - No aspirin, ibuprofen, naproxen, or other                             non-steroidal anti-inflammatory drugs for 5 days                             after polyp removal.                            - Await pathology  results.                            - Repeat colonoscopy in 1 year for surveillance.                                                                                   Procedure Code(s):       --- Professional ---       30720, Colonoscopy, flexible; with removal of tumor(s), polyp(s), or        other lesion(s) by snare technique       47273, 59, Colonoscopy, flexible; with biopsy, single or multiple    CPT copyright 2022 American Medical Association. All rights reserved.    The codes documented in this report are preliminary and                           upon  review may   be revised to meet current compliance requirements.      _______________________  BENOIT BRADY MD  12/1/2023 4:14:44 PM  I was physically present for the entire viewing portion of the exam.  BENOIT BRADY MD  Number of Addenda: 0    Note Initiated On: 12/1/2023 2:25 PM  Scope Withdrawal Time: 0 hours 33 minutes 37 seconds   Total Procedure Duration: 0 hours 45 minutes 32 seconds   Scope In: 3:14:18 PM  Scope Out: 3:59:50 PM             Signed Electronically by: Henrique Boles MD

## 2024-04-05 NOTE — TELEPHONE ENCOUNTER
Patient has been out of Trulicity for 1 month.  Ozempic has been more available lately, recommend patient try ozempic for 1-2 months until Trulicity can come back instock.  Since patient has been on 1.5mg of Trulicity ok to start at 0.5mg of Ozempic.    Pended prescription for 1 month of ozempic with 1 refill in case outage is not resolved.  Provided verbal instructions for how to take ozempic.     Katina Luna MS, RD, LD, CDE

## 2024-04-06 LAB
ALBUMIN SERPL BCG-MCNC: 4.2 G/DL (ref 3.5–5.2)
ALP SERPL-CCNC: 118 U/L (ref 40–150)
ALT SERPL W P-5'-P-CCNC: 21 U/L (ref 0–70)
ANION GAP SERPL CALCULATED.3IONS-SCNC: 11 MMOL/L (ref 7–15)
AST SERPL W P-5'-P-CCNC: 15 U/L (ref 0–45)
BILIRUB SERPL-MCNC: 0.9 MG/DL
BUN SERPL-MCNC: 21.7 MG/DL (ref 8–23)
CALCIUM SERPL-MCNC: 9.6 MG/DL (ref 8.8–10.2)
CHLORIDE SERPL-SCNC: 100 MMOL/L (ref 98–107)
CHOLEST SERPL-MCNC: 105 MG/DL
CREAT SERPL-MCNC: 0.92 MG/DL (ref 0.67–1.17)
CREAT UR-MCNC: 69.6 MG/DL
DEPRECATED HCO3 PLAS-SCNC: 25 MMOL/L (ref 22–29)
EGFRCR SERPLBLD CKD-EPI 2021: 84 ML/MIN/1.73M2
FASTING STATUS PATIENT QL REPORTED: NO
GLUCOSE SERPL-MCNC: 178 MG/DL (ref 70–99)
HDLC SERPL-MCNC: 33 MG/DL
LDLC SERPL CALC-MCNC: 49 MG/DL
MICROALBUMIN UR-MCNC: <12 MG/L
MICROALBUMIN/CREAT UR: NORMAL MG/G{CREAT}
NONHDLC SERPL-MCNC: 72 MG/DL
POTASSIUM SERPL-SCNC: 4.6 MMOL/L (ref 3.4–5.3)
PROT SERPL-MCNC: 6.6 G/DL (ref 6.4–8.3)
SODIUM SERPL-SCNC: 136 MMOL/L (ref 135–145)
TRIGL SERPL-MCNC: 113 MG/DL

## 2024-04-06 NOTE — RESULT ENCOUNTER NOTE
Evelio Montesnios, reviewed some of your labs,    Cholesterol panel shows low HDL at 33 goal for HDL is greater than 50, can increase HDL by increase exercise activities and weight loss strongly; recommended.  LDL the bad cholesterol is at goal, triglyceride is normal.  Labs are similar to 6 months ago.    Chemistry shows normal electrolytes, normal kidney function test, normal liver enzymes called AST ALT, normal total protein and albumin    Urine protein is negative which is reassuring    Test for diabetes HbA1c is still elevated 7.9.  Please follow-up with the diabetic educator and I did place a referral to endocrinology.    Any further questions let me know ,    Regards,  Dr Boles

## 2024-04-07 DIAGNOSIS — E11.69 TYPE 2 DIABETES MELLITUS WITH OTHER SPECIFIED COMPLICATION, WITHOUT LONG-TERM CURRENT USE OF INSULIN (H): ICD-10-CM

## 2024-04-08 RX ORDER — GLIMEPIRIDE 4 MG/1
TABLET ORAL
Qty: 180 TABLET | Refills: 1 | Status: SHIPPED | OUTPATIENT
Start: 2024-04-08

## 2024-05-20 ENCOUNTER — OFFICE VISIT (OUTPATIENT)
Dept: PHARMACY | Facility: CLINIC | Age: 84
End: 2024-05-20
Attending: INTERNAL MEDICINE
Payer: COMMERCIAL

## 2024-05-20 VITALS — SYSTOLIC BLOOD PRESSURE: 126 MMHG | BODY MASS INDEX: 29.84 KG/M2 | DIASTOLIC BLOOD PRESSURE: 70 MMHG | WEIGHT: 208 LBS

## 2024-05-20 DIAGNOSIS — E78.5 HYPERLIPIDEMIA LDL GOAL <100: ICD-10-CM

## 2024-05-20 DIAGNOSIS — I25.10 CORONARY ARTERY DISEASE INVOLVING NATIVE CORONARY ARTERY OF NATIVE HEART WITHOUT ANGINA PECTORIS: ICD-10-CM

## 2024-05-20 DIAGNOSIS — E11.69 TYPE 2 DIABETES MELLITUS WITH OTHER SPECIFIED COMPLICATION, WITHOUT LONG-TERM CURRENT USE OF INSULIN (H): Primary | ICD-10-CM

## 2024-05-20 DIAGNOSIS — Z78.9 TAKES DIETARY SUPPLEMENTS: ICD-10-CM

## 2024-05-20 DIAGNOSIS — I10 BENIGN ESSENTIAL HYPERTENSION: ICD-10-CM

## 2024-05-20 PROCEDURE — 99207 PR NO CHARGE LOS: CPT

## 2024-05-20 NOTE — PATIENT INSTRUCTIONS
"Recommendations from today's MTM visit:                                                    MTM (medication therapy management) is a service provided by a clinical pharmacist designed to help you get the most of out of your medicines.   Today we reviewed what your medicines are for, how to know if they are working, that your medicines are safe and how to make your medicine regimen as easy as possible.      You can take Metformin  mg all 4 tablets at once in the morning and this can make it easier to remember to take   MTM will discussing increasing the Ozempic to 1 mg with Dr. Boles     Follow-up: Return in about 1 month (around 6/20/2024) for Follow up, using a phone visit, medication therapy management.    It was great speaking with you today.  I value your experience and would be very thankful for your time in providing feedback in our clinic survey. In the next few days, you may receive an email or text message from [employer] with a link to a survey related to your  clinical pharmacist.\"     To schedule another MTM appointment, please call the clinic directly or you may call the MTM scheduling line at [phone] or toll-free at [phone].     My Clinical Pharmacist's contact information:                                                      Please feel free to contact me with any questions or concerns you have.      Adele Jessica PharmD  Medication Therapy Management (MTM) Resident  Pager: [phone]    Clair Booker PharmD, [facility]  Medication Therapy Management Provider  [phone]     "

## 2024-05-20 NOTE — PROGRESS NOTES
Medication Therapy Management (MTM) Encounter    ASSESSMENT:                            Medication Adherence/Access: See below for considerations    Diabetes:   Patient is not meeting A1c goal of < 7%. Patient would like to lose more weight and control blood sugars better. It would be reasonable to continue to increase Ozempic as tolerated and pursue a stricter A1c per patient's health goals. May also see more weight loss benefit by optimizing therapy with Ozempic, as opposed to switching back over to Trulicity again. Discussed the benefit of taking extended release metformin once daily as opposed to twice daily for improved medication adherence. Patient meeting UACR goal of <30 mg/L. Did not discuss due to time but would be due for [id number].        Hypertension/CAD:   Patient is meeting blood pressure goal of < 130/80mmHg in clinic today. Continue current medication regimen.     Hyperlipidemia:   Stable.    Supplements:   Should stop taking Vitamin E due to association with increased risk of hemorrhagic stroke.    PLAN:                            You can take Metformin  mg all 4 tablets at once in the morning and this can make it easier to remember to take   MTM will discussing increasing the Ozempic to 1 mg with Dr. Boles     Follow-up: Return in about 1 month (around 6/20/2024) for Follow up, using a phone visit, medication therapy management.  To Address at next visit:   Stop Vitamin E    SUBJECTIVE/OBJECTIVE:                          Jaguar Tristan is a 83 year old male coming in for an initial visit. He was referred to me from Dr. Boles.      Reason for visit: Medication Review    Allergies/ADRs: Reviewed in chart  Past Medical History: Reviewed in chart  Tobacco: He reports that he has never smoked. He has never used smokeless tobacco.  Alcohol: Less than 1 beverage / month glass of wine or beer.   Caffeine: Drinks 1 cup of coffee in the morning, and a cup of green of tea every once in a while.  "    Medication Adherence/Access:   Patient takes medications directly from bottles.  Misses the evening dose of the metformin 3-4 times per week.  Evening medications are more difficult to remember to take.       Diabetes   Ozempic 0.5 mg weekly - takes his doses on . Has noticed some appetite suppression and weight loss. No report of of nausea or other GI upset. Says that he has been on this dose for about 6 weeks now.  Patient wants to know if he should be taking this or the Trulicity- it [facility]  Glimepiride 8 mg daily   Metformin XR 2000 mg daily   Aspirin 81mg daily as secondary prevention.     Current diabetes symptoms: none  Blood sugar monitorin-3 time(s) per week; Ranges: (patient reported) Fasting- 150-158 mg/dL    Diet/Exercise: walking more but not getting as much exercise, diet is \"stable\" he eats what his wife makes. He says that he would like to lose more weight.       Eye exam is up to date  Foot exam is up to date     Hypertension /CAD  Carvedilol 12.5 mg twice daily   Lisinopril-Hydrochlorothiazide 20-12.5 mg daily     Patient reports no current medication side effects  Patient self monitors blood pressure.  Home BP monitoring once every two weeks. Does not recall any recent numbers.   .       Hyperlipidemia   Atorvastatin 80 mg daily  Patient reports no significant myalgias or other side effects.  Historical  Vascepa had to stop because of diarrhea     Supplements   Vitamin B-12 2 tablets daily   Preservision AREDs 1 tablet twice daily   Vitamin E 2 tablets in the morning   Osteo-Biflex 1 tablet daily   No reported issues at this time.        Today's Vitals: /70   Wt 208 lb (94.3 kg)   BMI 29.84 kg/m    ----------------    I spent 60 minutes with this patient today. I offer these suggestions for consideration by Dr. Boles. A copy of the visit note was provided to the patient's provider(s).    A summary of these recommendations was given to the " patient.    Adele Jessica PharmD  Medication Therapy Management (MTM) Resident  Pager: [phone]    The patient was seen independently by Dr. Jessica.  I have read the note and agree with the assessment and plan.  Junaid CanelaD, [facility]  Medication Therapy Management Provider  [phone]       Medication Therapy Recommendations  Type 2 diabetes mellitus with other specified complication, without long-term current use of insulin (H)    Current Medication: metFORMIN (GLUCOPHAGE XR) 500 MG 24 hr tablet   Rationale: Frequency inappropriate - Dosage too low - Effectiveness   Recommendation: Change Administration Time   Status: Patient Agreed - Adherence/Education          Current Medication: semaglutide (OZEMPIC) 2 MG/3ML pen   Rationale: Dose too low - Dosage too low - Effectiveness   Recommendation: Increase Dose - Ozempic (1 MG/DOSE) 2 MG/1.5ML Sopn   Status: Contact Provider - Awaiting Response

## 2024-05-29 DIAGNOSIS — E11.69 TYPE 2 DIABETES MELLITUS WITH OTHER SPECIFIED COMPLICATION, WITHOUT LONG-TERM CURRENT USE OF INSULIN (H): ICD-10-CM

## 2024-05-29 RX ORDER — SEMAGLUTIDE 0.68 MG/ML
0.5 INJECTION, SOLUTION SUBCUTANEOUS
Qty: 3 ML | Refills: 1 | Status: SHIPPED | OUTPATIENT
Start: 2024-05-29 | End: 2024-06-17 | Stop reason: DRUGHIGH

## 2024-05-29 NOTE — TELEPHONE ENCOUNTER
Per last refill encounter...        Will route refill request to the provider: continue with the Ozempic or send script for Trulicity?    Thank you,  Thania Bennett RN

## 2024-06-17 ENCOUNTER — TELEPHONE (OUTPATIENT)
Dept: PHARMACY | Facility: CLINIC | Age: 84
End: 2024-06-17
Payer: MEDICARE

## 2024-06-17 DIAGNOSIS — E11.69 TYPE 2 DIABETES MELLITUS WITH OTHER SPECIFIED COMPLICATION, WITHOUT LONG-TERM CURRENT USE OF INSULIN (H): Primary | ICD-10-CM

## 2024-06-17 NOTE — TELEPHONE ENCOUNTER
Returned patient call requesting increase of Ozempic 1 mg. Dr. Boles verbally authorized the dose increase from 0.5 mg to 1 mg.     Spoke to Jaguar to update him about the approval.   Patient states that he has 2 more doses of the 0.5 mg left. Discussed that it would be okay to finish out current supply and start the 1 mg dose after, this may also allow his pharmacy time to order the medication.     Patient agreed.     Adele Jessica [facility] on 6/17/2024 at 5:16 PM    I have read the note as written by Dr. Jessica and agree with the plan.  Clair Booker, PharmD, [facility]  Medication Therapy Management Provider  [phone]

## 2024-06-27 DIAGNOSIS — I10 ESSENTIAL HYPERTENSION: ICD-10-CM

## 2024-06-27 RX ORDER — LISINOPRIL AND HYDROCHLOROTHIAZIDE 12.5; 2 MG/1; MG/1
TABLET ORAL
Qty: 90 TABLET | Refills: 2 | Status: SHIPPED | OUTPATIENT
Start: 2024-06-27

## 2024-08-30 DIAGNOSIS — E11.69 TYPE 2 DIABETES MELLITUS WITH OTHER SPECIFIED COMPLICATION, WITHOUT LONG-TERM CURRENT USE OF INSULIN (H): ICD-10-CM

## 2024-08-30 RX ORDER — SEMAGLUTIDE 1.34 MG/ML
1 INJECTION, SOLUTION SUBCUTANEOUS
Qty: 3 ML | Refills: 1 | Status: SHIPPED | OUTPATIENT
Start: 2024-08-30

## 2024-09-28 DIAGNOSIS — E78.5 HYPERLIPIDEMIA LDL GOAL <100: ICD-10-CM

## 2024-09-29 DIAGNOSIS — I21.4 NSTEMI (NON-ST ELEVATED MYOCARDIAL INFARCTION) (H): ICD-10-CM

## 2024-09-30 RX ORDER — CARVEDILOL 12.5 MG/1
12.5 TABLET ORAL 2 TIMES DAILY
Qty: 180 TABLET | Refills: 0 | Status: SHIPPED | OUTPATIENT
Start: 2024-09-30

## 2024-09-30 RX ORDER — ATORVASTATIN CALCIUM 80 MG/1
TABLET, FILM COATED ORAL
Qty: 90 TABLET | Refills: 1 | Status: SHIPPED | OUTPATIENT
Start: 2024-09-30

## 2024-10-03 ENCOUNTER — TELEPHONE (OUTPATIENT)
Dept: PHARMACY | Facility: CLINIC | Age: 84
End: 2024-10-03
Payer: MEDICARE

## 2024-10-03 NOTE — TELEPHONE ENCOUNTER
We have been unable to reach this patient for MTM follow-up after several attempts. We will stop reaching out to the patient at this time. Please let us know if we can assist in this patient's care in the future.    Routing to PCP as DION Booker, PharmD, [facility]  Medication Therapy Management Provider  [phone]

## 2024-12-31 DIAGNOSIS — I21.4 NSTEMI (NON-ST ELEVATED MYOCARDIAL INFARCTION) (H): ICD-10-CM

## 2024-12-31 RX ORDER — CARVEDILOL 12.5 MG/1
12.5 TABLET ORAL 2 TIMES DAILY
Qty: 180 TABLET | Refills: 0 | Status: SHIPPED | OUTPATIENT
Start: 2024-12-31

## 2025-01-02 DIAGNOSIS — E11.69 TYPE 2 DIABETES MELLITUS WITH OTHER SPECIFIED COMPLICATION, WITHOUT LONG-TERM CURRENT USE OF INSULIN (H): ICD-10-CM

## 2025-01-02 RX ORDER — GLIMEPIRIDE 4 MG/1
TABLET ORAL
Qty: 180 TABLET | Refills: 1 | Status: SHIPPED | OUTPATIENT
Start: 2025-01-02

## 2025-01-16 ENCOUNTER — TRANSFERRED RECORDS (OUTPATIENT)
Dept: MULTI SPECIALTY CLINIC | Facility: CLINIC | Age: 85
End: 2025-01-16

## 2025-01-16 LAB — RETINOPATHY: NORMAL

## 2025-02-05 ENCOUNTER — TRANSCRIBE ORDERS (OUTPATIENT)
Dept: GASTROENTEROLOGY | Facility: CLINIC | Age: 85
End: 2025-02-05
Payer: MEDICARE

## 2025-02-05 DIAGNOSIS — Z86.0100 PERSONAL HISTORY OF COLON POLYPS, UNSPECIFIED: Primary | ICD-10-CM

## 2025-03-11 ENCOUNTER — TELEPHONE (OUTPATIENT)
Dept: GASTROENTEROLOGY | Facility: CLINIC | Age: 85
End: 2025-03-11
Payer: MEDICARE

## 2025-03-11 NOTE — TELEPHONE ENCOUNTER
"Back in town on 17th,  will call back. Jose Antonio doesn't have schedule at , patient prefers .     Endoscopy Scheduling Screen    Have you had any respiratory illness or flu-like symptoms in the last 10 days?  No    What is your communication preference for Instructions and/or Bowel Prep?   Mail/USPS    What insurance is in the chart?  Other:  medicare  & bcbs  New medicare #: [id number]      Ordering/Referring Provider: JOSE ANTONIO   (If ordering provider performs procedure, schedule with ordering provider unless otherwise instructed. )    BMI: Estimated body mass index is 29.84 kg/m  as calculated from the following:    Height as of 4/5/24: 1.778 m (5' 10\").    Weight as of 5/20/24: 94.3 kg (208 lb).     Sedation Ordered  moderate sedation.   If patient BMI > 50 do not schedule in [facility].    If patient BMI > 45 do not schedule at [facility].    Are you taking methadone or Suboxone?  NO, No RN review required.    Have you been diagnosed and are being treated for severe PTSD or severe anxiety?  NO, No RN review required.    Are you taking any prescription medications for pain 3 or more times per week?   NO, No RN review required.    Do you have a history of malignant hyperthermia?  No    (Females) Are you currently pregnant?        Have you been diagnosed or told you have pulmonary hypertension?   No    Do you have an LVAD?  No    Have you been told you have moderate to severe sleep apnea?  No.    Have you been told you have COPD, asthma, or any other lung disease?  No    Has your doctor ordered any cardiac tests like echo, angiogram, stress test, ablation, or EKG, that you have not completed yet?  No    Do you  have a history of any heart conditions?  Yes   2 stents many years ago    Have you had any hospitalizations  in the last year for heart related issues, for example a stent placement, heart attack, or cardiomyopathy?  No    Do you have any implantable devices in your body (pacemaker, ICD)?  No    Do you take " "nitroglycerine?  No    Have you ever had or are you waiting for an organ transplant?  No. Continue scheduling, no site restrictions.    Have you had a stroke or transient ischemic attack (TIA aka \"mini stroke\") in the last 2 years?   No.    Have you been diagnosed with or been told you have cirrhosis of the liver?   No.    Are you currently on dialysis?   No    Do you need assistance transferring?   No    BMI: Estimated body mass index is 29.84 kg/m  as calculated from the following:    Height as of 4/5/24: 1.778 m (5' 10\").    Weight as of 5/20/24: 94.3 kg (208 lb).     Is patients BMI > 40 and scheduling location [facility]?  No    Do you take an injectable or oral medication for weight loss or diabetes (excluding insulin)?  No    Do you take the medication Naltrexone?  No    Do you take blood thinners?  No       Prep   Are you currently on dialysis or do you have chronic kidney disease?  No    Do you have a diagnosis of diabetes?  Yes (Golytely Prep)    Do you have a diagnosis of cystic fibrosis (CF)?  No    On a regular basis do you go 3 -5 days between bowel movements?  No    BMI > 40?  No    Preferred Pharmacy:    [facility] [address] [place] [facility] [address]  [address] [place] [address]  Phone: [phone] Fax: [phone]    Final Scheduling Details     Procedure scheduled  Colonoscopy    Surgeon:  RASHEL     Date of procedure:  TBD     Pre-OP / PAC:   No - Not required for this site.    Location   TBD    Sedation   Moderate Sedation - Per order.      Patient Reminders:   You will receive a call from a Nurse to review instructions and health history.  This assessment must be completed prior to your procedure.  Failure to complete the Nurse assessment may result in the procedure being cancelled.      On the day of your procedure, please designate an adult(s) who can drive you home stay with you for the next 24 hours. The medicines used in the exam will make you sleepy. You will not be " able to drive.      You cannot take public transportation, ride share services, or non-medical taxi service without a responsible caregiver.  Medical transport services are allowed with the requirement that a responsible caregiver will receive you at your destination.  We require that drivers and caregivers are confirmed prior to your procedure.

## 2025-03-27 DIAGNOSIS — I21.4 NSTEMI (NON-ST ELEVATED MYOCARDIAL INFARCTION) (H): ICD-10-CM

## 2025-03-27 RX ORDER — CARVEDILOL 12.5 MG/1
12.5 TABLET ORAL 2 TIMES DAILY
Qty: 60 TABLET | Refills: 0 | Status: SHIPPED | OUTPATIENT
Start: 2025-03-27

## 2025-04-04 PROBLEM — C79.51 MALIGNANT NEOPLASM METASTATIC TO BONE (H): Status: ACTIVE | Noted: 2025-04-04

## 2025-04-05 DIAGNOSIS — I10 ESSENTIAL HYPERTENSION: ICD-10-CM

## 2025-04-07 ENCOUNTER — TELEPHONE (OUTPATIENT)
Dept: FAMILY MEDICINE | Facility: CLINIC | Age: 85
End: 2025-04-07
Payer: MEDICARE

## 2025-04-07 ENCOUNTER — PATIENT OUTREACH (OUTPATIENT)
Dept: CARE COORDINATION | Facility: CLINIC | Age: 85
End: 2025-04-07
Payer: MEDICARE

## 2025-04-07 DIAGNOSIS — E11.69 TYPE 2 DIABETES MELLITUS WITH OTHER SPECIFIED COMPLICATION, WITHOUT LONG-TERM CURRENT USE OF INSULIN (H): ICD-10-CM

## 2025-04-07 RX ORDER — LISINOPRIL AND HYDROCHLOROTHIAZIDE 12.5; 2 MG/1; MG/1
1 TABLET ORAL
Qty: 90 TABLET | Refills: 0 | Status: SHIPPED | OUTPATIENT
Start: 2025-04-07

## 2025-04-07 NOTE — TELEPHONE ENCOUNTER
Patient Contact     Attempt # 1     Was call answered?  No.  Left message on voicemail with information to call triage back.     On callback please inform pt on the lab results.      Yasmany Fonseca RN  [facility]

## 2025-04-07 NOTE — TELEPHONE ENCOUNTER
Henrique Boles MD  [facility] Nurse [place] - [facility]  Evelio Montesinos, It was very nice to meet you today. I had the opportunity to review your lab results.    Cholesterol panel shows low HDL,.  HDL is 34, goal greater than 50, you can increase HDL by increase exercise activity as tolerated and weight loss.  LDL bad cholesterol is at goal, triglycerides normal.    Chemistry shows normal lites potassium upper range of normal, urea nitrogen slightly increased please increase fluid intake.  Kidney function test is normal GFR of 56 reassuring, calcium level is normal,  Liver enzymes AST ALT are normal alk phosphatase slightly elevated.    Glucose level is high at 370.  Total protein albumin is normal.    Test for diabetes HbA1c is elevated 10.3.  Please schedule follow-up with the diabetic educator and medical therapy management team.  Also referral has been placed to endocrinology diabetes specialist.    CBC shows normal white blood cell count, slight decrease in hemoglobin hematocrit suggestive of mild anemia we will check iron levels.    Ferritin which is iron stores is normal, suggest you do not have iron deficiency anemia.  Also iron level in the blood is normal.    vitamin B12 is very elevated at 3057.  Normal up to 1245.  Please cut down on vitamin B12 supplements.         Urine protein is negative which is reassuring.    Repeat your diabetes test in 8 weeks.        It has been a pleasure to participate in your care.  Please call our clinic if you have any questions or concerns.    Sincerely,    Henrique Boles MD on 4/4/2025 at 6:57 PM

## 2025-04-08 NOTE — TELEPHONE ENCOUNTER
Patient Contact     Attempt # 2     Was call answered?  no.  Left message on voicemail with information to call triage back.     Upon call back, please relay result message below to Patient.    Ani DELATORRE,  Triage RN  [facility]

## 2025-04-09 ENCOUNTER — PATIENT OUTREACH (OUTPATIENT)
Dept: CARE COORDINATION | Facility: CLINIC | Age: 85
End: 2025-04-09
Payer: MEDICARE

## 2025-04-09 RX ORDER — SEMAGLUTIDE 1.34 MG/ML
1 INJECTION, SOLUTION SUBCUTANEOUS
Qty: 3 ML | Refills: 0 | Status: SHIPPED | OUTPATIENT
Start: 2025-04-09

## 2025-04-09 NOTE — TELEPHONE ENCOUNTER
Relayed lab results to patient. Patient verbalized understanding and requesting script for Ozempic to be sent to pharmacy.     Pharmacy pended.

## 2025-04-17 ENCOUNTER — ALLIED HEALTH/NURSE VISIT (OUTPATIENT)
Dept: EDUCATION SERVICES | Facility: CLINIC | Age: 85
End: 2025-04-17
Attending: INTERNAL MEDICINE
Payer: MEDICARE

## 2025-04-17 DIAGNOSIS — E11.69 TYPE 2 DIABETES MELLITUS WITH OTHER SPECIFIED COMPLICATION, WITHOUT LONG-TERM CURRENT USE OF INSULIN (H): ICD-10-CM

## 2025-04-17 NOTE — LETTER
4/17/2025         RE: Jaguar Tristan  [address] [address]        Dear Colleague,    Thank you for referring your patient, Jaguar Tristan, to the [facility] [place]. Please see a copy of my visit note below.      Diabetes Self-Management Education & Support    Presents for: Individual Review    Type of Service: In Person Visit      Assessment  Stopped Ozempic about a year ago. Had diarrhea with it which is why he stopped it. Since his last A1c though, he has since resumed taking it.  No diarrhea at this point. Is taking 1 mg dose now. Has a visit with MTM in a month. Explained that they can help assess his tolerance with a bit more time on it and if any changes should be made. Reports it was $615. On Medicare and knows the deductible is $2000 per year so reports the expense is fine.     Has since been trying to increase his activity as well and is doing the exercise bike each day and is doing 5-6 min per day.     Reducing desert , candy, and OJ since his last A1C as well. He was not checking his BG at home for a while but has resumed this the past couple days. His readings today and yesterday were both in target of  mg/dL. Praised him on his diet changes, increase in his activity, and BG readings. Encouraged him to continue all these changes.     [place]       Patient's most recent   Lab Results   Component Value Date    A1C 10.3 04/04/2025    A1C 8.9 05/25/2021     is not meeting goal of <7.0    Diabetes knowledge and skills assessment:   Patient is knowledgeable in diabetes management concepts related to: Taking Medication    Based on learning assessment above, most appropriate setting for further diabetes education would be: Group class or Individual setting.    Care Plan and Education Provided:  Healthy Eating: Balanced meals, Carbohydrate Counting, Consistency in amount and timing of carbohydrate intake, and Label reading  Being Active:  Finding a physical activity routine that works for you and Relationship of activity to glucose  Monitoring: Frequency of monitoring, Individual glucose targets, and Log and interpret results  Taking Medication: Action of prescribed medication(s)  Problem Solving: Low glucose - causes, signs/symptoms, treatment and prevention, Rule of 15 and carrying a carbohydrate source at all times in case of low glucose, and When to call a health care provider    Patient verbalized understanding of diabetes self-management education concepts discussed, opportunities for ongoing education and support, and recommendations provided today.    Plan  No changes made today.   He just resumed his Ozempic. If he does not tolerate the Ozempic 1 mg, recommend to reduce his dose to 0.25 mg for 4 weeks and then 0.5 mg for 4 weeks to titrate it gradually since he has been without it for so long prior to resuming.   MTM visit in 1 month scheduled.  Discussed that she can help assess how he is doing with his Ozempic but he can let us know sooner if he is not tolerating it.   Try to avoid missing meals and aim for a consistent amount of carbs at meals (45-60 gm per meal).   Try to pair carbs (such as toast, fruit) with some protein and/or vegetables.  Bring lunch to work.  Continue to check BG once daily. Briefly mentioned OTC CGMs but he is not interested in these. Try alternating times of testing so some are 2 hours after his main meal.   Instead of drinking juice, eat the fruit instead.     Topics to cover at upcoming visits: Reducing Risks and Healthy Coping    Follow-up:  Will see how the next A1C goes and can schedule follow up after that as needed.       See Care Plan for co-developed, patient-state behavior change goals.    Education Materials Provided:  -- Carbohydrate Counting  -- BG logbook      Subjective/Objective  Jaguar is an 84 year old, presenting for the following diabetes education related to: Initial Assessment for new  "diagnosis  Cultural Influences/Ethnic Background:  Not  or     Diabetes Symptoms & Complications:  Weight trend: Decreasing  Disease course: Worsening  Complications assessed today?: Yes  Heart disease: Yes  Sexual dysfunction: Yes    Patient Problem List and Family Medical History reviewed for relevant medical history, current medical status, and diabetes risk factors.    Vitals:  There were no vitals taken for this visit.  Estimated body mass index is 29.84 kg/m  as calculated from the following:    Height as of 4/4/25: 1.778 m (5' 10\").    Weight as of 4/4/25: 94.3 kg (208 lb).   Last 3 BP:   BP Readings from Last 3 Encounters:   04/04/25 125/74   05/20/24 126/70   04/05/24 131/67       History   Smoking Status     Never   Smokeless Tobacco     Never       Labs:  Lab Results   Component Value Date    A1C 10.3 04/04/2025    A1C 8.9 05/25/2021     Lab Results   Component Value Date     04/04/2025     03/07/2022     05/25/2021     Lab Results   Component Value Date    LDL 59 04/04/2025    LDL 61 11/17/2020     HDL Cholesterol   Date Value Ref Range Status   11/17/2020 40 >39 mg/dL Final     Direct Measure HDL   Date Value Ref Range Status   04/04/2025 34 (L) >=40 mg/dL Final   ]  GFR Estimate   Date Value Ref Range Status   04/04/2025 86 >60 mL/min/1.73m2 Final     Comment:     eGFR calculated using 2021 CKD-EPI equation.   05/25/2021 83 >60 mL/min/[1.73_m2] Final     Comment:     Non  GFR Calc  Starting 12/18/2018, serum creatinine based estimated GFR (eGFR) will be   calculated using the Chronic Kidney Disease Epidemiology Collaboration   (CKD-EPI) equation.       GFR Estimate If Black   Date Value Ref Range Status   05/25/2021 >90 >60 mL/min/[1.73_m2] Final     Comment:      GFR Calc  Starting 12/18/2018, serum creatinine based estimated GFR (eGFR) will be   calculated using the Chronic Kidney Disease Epidemiology Collaboration   (CKD-EPI) " equation.       Lab Results   Component Value Date    CR 0.88 04/04/2025    CR 0.86 05/25/2021     Lab Results   Component Value Date    MICROL <12.0 04/04/2025    UMALCR  04/04/2025      Comment:      Unable to calculate, urine albumin and/or urine creatinine is outside detectable limits.  Microalbuminuria is defined as an albumin:creatinine ratio of 17 to 299 for males and 25 to 299 for females. A ratio of albumin:creatinine of 300 or higher is indicative of overt proteinuria.  Due to biologic variability, positive results should be confirmed by a second, first-morning random or 24-hour timed urine specimen. If there is discrepancy, a third specimen is recommended. When 2 out of 3 results are in the microalbuminuria range, this is evidence for incipient nephropathy and warrants increased efforts at glucose control, blood pressure control, and institution of therapy with an angiotensin-converting-enzyme (ACE) inhibitor (if the patient can tolerate it).      UCRR 45.2 04/04/2025 4/17/2025   Healthy Eating   Healthy Eating Assessed Today Yes   Cultural/Orthodox diet restrictions? No   Meal planning/habits Smaller portions   Meals include Breakfast;Lunch;Dinner   Breakfast toast with butter   Lunch salad OR sandwich   Dinner biggest meal of the day (wife makes)   Other Trying to be mindful of food choices, notices higher numbers when he eats higher carb foods   Has patient met with a dietitian in the past? Yes         4/17/2025   Being Active   Being Active Assessed Today Yes   Exercise: Yes   Days per week of moderate to strenuous exercise (like a brisk walk) 7   On average, minutes per day of exercise at this level 10   How intense was your typical exercise?  Moderate (like brisk walking)   Exercise Minutes per Week 70   Barrier to exercise None         4/17/2025   Monitoring   Monitoring Assessed Today Yes   Did patient bring glucose meter to appointment?  No   Blood Glucose Meter Unknown   Times  checking blood sugar at home (number) 1   Times checking blood sugar at home (per) Day   Blood glucose trend Decreasing     Just resumed checking his BG at home.   today, 125 yesterday (before breakfast)    Diabetes Medication(s)       Biguanides       metFORMIN (GLUCOPHAGE XR) 500 MG 24 hr tablet TAKE 2 TABLETS BY MOUTH TWICE A DAY WITH MEALS       Sulfonylureas       glimepiride (AMARYL) 4 MG tablet TAKE 2 TABLETS BY MOUTH EVERY DAY IN THE MORNING BEFORE BREAKFAST       Incretin Mimetic Agents       Semaglutide, 1 MG/DOSE, (OZEMPIC, 1 MG/DOSE,) 4 MG/3ML pen Inject 1 mg subcutaneously every 7 days.              4/17/2025   Taking Medications   Taking Medication Assessed Today Yes   Current Treatments Non-insulin Injectables;Oral Medication (taken by mouth)   Problems taking diabetes medications regularly? No   Diabetes medication side effects? No         4/17/2025   Problem Solving   Problem Solving Assessed Today Yes   Is the patient at risk for hypoglycemia? Yes   Hypoglycemia Frequency Never   Does patient have glucagon emergency kit? No   Is the patient at risk for DKA? No           4/17/2025   Healthy Coping: Diabetes Distress Assessment   Healthy Coping Assessed Today Yes   Informal Support system: Spouse       Jennifer Lockett, [facility]  Time Spent: 40 minutes  Encounter Type: Individual    Any diabetes medication dose changes were made via the [facility] Standing Orders under the patient's referring provider.

## 2025-04-17 NOTE — PATIENT INSTRUCTIONS
Signs/symptoms of low blood sugar include (but are not limited to): sweating, shaking, feeling dizzy or weak, extreme hunger, headache, feeling crabby or confused, numbness or tingling of mouth/lips.  If you have these symptoms check your blood sugar if you can and then consume carbohydrate (sugar)  This is a helpful reminder on how to treat a blood sugar if you are low:  If your blood sugar is < 70 mg/dL, consume 15 grams of fast-acting carbohydrate (4 glucose tabs OR 4 oz juice or non-diet pop OR 2 Tbsp dried fruit OR 5-7 lifesavers OR 1 Tbsp sugar) and wait 15 minutes. Check blood sugar again and if not rising, repeat.  If your blood sugar is < 50 mg/dL, consume 30 grams of fast-acting carbohydrate (8 glucose tabs OR 8 oz juice or non-diet pop OR 4 Tbsp dried fruit OR 10-14 lifesavers OR 2 Tbsp sugar) and wait 15 minutes. Check blood sugar again and if not rising, repeat.      Goal is  mg/dL before meals and <180 mg/dL after meals.     Could try the unsweetened bubbly gibson (Spindrift, La Skip, Bubnito)      Let's see how your next A1c is.  If you have questions or concerns after, feel free to schedule a follow up.     To schedule a follow up, call

## 2025-04-17 NOTE — PROGRESS NOTES
Diabetes Self-Management Education & Support    Presents for: Individual Review    Type of Service: In Person Visit      Assessment  Stopped Ozempic about a year ago. Had diarrhea with it which is why he stopped it. Since his last A1c though, he has since resumed taking it.  No diarrhea at this point. Is taking 1 mg dose now. Has a visit with MTM in a month. Explained that they can help assess his tolerance with a bit more time on it and if any changes should be made. Reports it was $615. On Medicare and knows the deductible is $2000 per year so reports the expense is fine.     Has since been trying to increase his activity as well and is doing the exercise bike each day and is doing 5-6 min per day.     Reducing desert , candy, and OJ since his last A1C as well. He was not checking his BG at home for a while but has resumed this the past couple days. His readings today and yesterday were both in target of  mg/dL. Praised him on his diet changes, increase in his activity, and BG readings. Encouraged him to continue all these changes.     [place]       Patient's most recent   Lab Results   Component Value Date    A1C 10.3 04/04/2025    A1C 8.9 05/25/2021     is not meeting goal of <7.0    Diabetes knowledge and skills assessment:   Patient is knowledgeable in diabetes management concepts related to: Taking Medication    Based on learning assessment above, most appropriate setting for further diabetes education would be: Group class or Individual setting.    Care Plan and Education Provided:  Healthy Eating: Balanced meals, Carbohydrate Counting, Consistency in amount and timing of carbohydrate intake, and Label reading  Being Active: Finding a physical activity routine that works for you and Relationship of activity to glucose  Monitoring: Frequency of monitoring, Individual glucose targets, and Log and interpret results  Taking Medication: Action of prescribed medication(s)  Problem  Solving: Low glucose - causes, signs/symptoms, treatment and prevention, Rule of 15 and carrying a carbohydrate source at all times in case of low glucose, and When to call a health care provider    Patient verbalized understanding of diabetes self-management education concepts discussed, opportunities for ongoing education and support, and recommendations provided today.    Plan  No changes made today.   He just resumed his Ozempic. If he does not tolerate the Ozempic 1 mg, recommend to reduce his dose to 0.25 mg for 4 weeks and then 0.5 mg for 4 weeks to titrate it gradually since he has been without it for so long prior to resuming.   MTM visit in 1 month scheduled.  Discussed that she can help assess how he is doing with his Ozempic but he can let us know sooner if he is not tolerating it.   Try to avoid missing meals and aim for a consistent amount of carbs at meals (45-60 gm per meal).   Try to pair carbs (such as toast, fruit) with some protein and/or vegetables.  Bring lunch to work.  Continue to check BG once daily. Briefly mentioned OTC CGMs but he is not interested in these. Try alternating times of testing so some are 2 hours after his main meal.   Instead of drinking juice, eat the fruit instead.     Topics to cover at upcoming visits: Reducing Risks and Healthy Coping    Follow-up:  Will see how the next A1C goes and can schedule follow up after that as needed.       See Care Plan for co-developed, patient-state behavior change goals.    Education Materials Provided:  -- Carbohydrate Counting  -- BG logbook      Subjective/Objective  Jaguar is an 84 year old, presenting for the following diabetes education related to: Initial Assessment for new diagnosis  Cultural Influences/Ethnic Background:  Not  or     Diabetes Symptoms & Complications:  Weight trend: Decreasing  Disease course: Worsening  Complications assessed today?: Yes  Heart disease: Yes  Sexual dysfunction: Yes    Patient  "Problem List and Family Medical History reviewed for relevant medical history, current medical status, and diabetes risk factors.    Vitals:  There were no vitals taken for this visit.  Estimated body mass index is 29.84 kg/m  as calculated from the following:    Height as of 4/4/25: 1.778 m (5' 10\").    Weight as of 4/4/25: 94.3 kg (208 lb).   Last 3 BP:   BP Readings from Last 3 Encounters:   04/04/25 125/74   05/20/24 126/70   04/05/24 131/67       History   Smoking Status    Never   Smokeless Tobacco    Never       Labs:  Lab Results   Component Value Date    A1C 10.3 04/04/2025    A1C 8.9 05/25/2021     Lab Results   Component Value Date     04/04/2025     03/07/2022     05/25/2021     Lab Results   Component Value Date    LDL 59 04/04/2025    LDL 61 11/17/2020     HDL Cholesterol   Date Value Ref Range Status   11/17/2020 40 >39 mg/dL Final     Direct Measure HDL   Date Value Ref Range Status   04/04/2025 34 (L) >=40 mg/dL Final   ]  GFR Estimate   Date Value Ref Range Status   04/04/2025 86 >60 mL/min/1.73m2 Final     Comment:     eGFR calculated using 2021 CKD-EPI equation.   05/25/2021 83 >60 mL/min/[1.73_m2] Final     Comment:     Non  GFR Calc  Starting 12/18/2018, serum creatinine based estimated GFR (eGFR) will be   calculated using the Chronic Kidney Disease Epidemiology Collaboration   (CKD-EPI) equation.       GFR Estimate If Black   Date Value Ref Range Status   05/25/2021 >90 >60 mL/min/[1.73_m2] Final     Comment:      GFR Calc  Starting 12/18/2018, serum creatinine based estimated GFR (eGFR) will be   calculated using the Chronic Kidney Disease Epidemiology Collaboration   (CKD-EPI) equation.       Lab Results   Component Value Date    CR 0.88 04/04/2025    CR 0.86 05/25/2021     Lab Results   Component Value Date    MICROL <12.0 04/04/2025    UMALCR  04/04/2025      Comment:      Unable to calculate, urine albumin and/or urine creatinine is " outside detectable limits.  Microalbuminuria is defined as an albumin:creatinine ratio of 17 to 299 for males and 25 to 299 for females. A ratio of albumin:creatinine of 300 or higher is indicative of overt proteinuria.  Due to biologic variability, positive results should be confirmed by a second, first-morning random or 24-hour timed urine specimen. If there is discrepancy, a third specimen is recommended. When 2 out of 3 results are in the microalbuminuria range, this is evidence for incipient nephropathy and warrants increased efforts at glucose control, blood pressure control, and institution of therapy with an angiotensin-converting-enzyme (ACE) inhibitor (if the patient can tolerate it).      UCRR 45.2 04/04/2025 4/17/2025   Healthy Eating   Healthy Eating Assessed Today Yes   Cultural/Catholic diet restrictions? No   Meal planning/habits Smaller portions   Meals include Breakfast;Lunch;Dinner   Breakfast toast with butter   Lunch salad OR sandwich   Dinner biggest meal of the day (wife makes)   Other Trying to be mindful of food choices, notices higher numbers when he eats higher carb foods   Has patient met with a dietitian in the past? Yes         4/17/2025   Being Active   Being Active Assessed Today Yes   Exercise: Yes   Days per week of moderate to strenuous exercise (like a brisk walk) 7   On average, minutes per day of exercise at this level 10   How intense was your typical exercise?  Moderate (like brisk walking)   Exercise Minutes per Week 70   Barrier to exercise None         4/17/2025   Monitoring   Monitoring Assessed Today Yes   Did patient bring glucose meter to appointment?  No   Blood Glucose Meter Unknown   Times checking blood sugar at home (number) 1   Times checking blood sugar at home (per) Day   Blood glucose trend Decreasing     Just resumed checking his BG at home.   today, 125 yesterday (before breakfast)    Diabetes Medication(s)       Biguanides       metFORMIN  (GLUCOPHAGE XR) 500 MG 24 hr tablet TAKE 2 TABLETS BY MOUTH TWICE A DAY WITH MEALS       Sulfonylureas       glimepiride (AMARYL) 4 MG tablet TAKE 2 TABLETS BY MOUTH EVERY DAY IN THE MORNING BEFORE BREAKFAST       Incretin Mimetic Agents       Semaglutide, 1 MG/DOSE, (OZEMPIC, 1 MG/DOSE,) 4 MG/3ML pen Inject 1 mg subcutaneously every 7 days.              4/17/2025   Taking Medications   Taking Medication Assessed Today Yes   Current Treatments Non-insulin Injectables;Oral Medication (taken by mouth)   Problems taking diabetes medications regularly? No   Diabetes medication side effects? No         4/17/2025   Problem Solving   Problem Solving Assessed Today Yes   Is the patient at risk for hypoglycemia? Yes   Hypoglycemia Frequency Never   Does patient have glucagon emergency kit? No   Is the patient at risk for DKA? No           4/17/2025   Healthy Coping: Diabetes Distress Assessment   Healthy Coping Assessed Today Yes   Informal Support system: Spouse       MITALI Serrano [facility]  Time Spent: 40 minutes  Encounter Type: Individual    Any diabetes medication dose changes were made via the [facility] Standing Orders under the patient's referring provider.

## 2025-05-01 ENCOUNTER — TELEPHONE (OUTPATIENT)
Dept: GASTROENTEROLOGY | Facility: CLINIC | Age: 85
End: 2025-05-01
Payer: MEDICARE

## 2025-05-01 DIAGNOSIS — Z12.11 ENCOUNTER FOR SCREENING COLONOSCOPY: Primary | ICD-10-CM

## 2025-05-01 RX ORDER — BISACODYL 5 MG/1
TABLET, DELAYED RELEASE ORAL
Qty: 4 TABLET | Refills: 0 | Status: SHIPPED | OUTPATIENT
Start: 2025-05-01

## 2025-05-01 NOTE — LETTER
May 1, 2025      Jaguar Tristan  [address] [id number]              Dear Jaguar,    Colonoscopy     Procedure date: 5/16/25    Anticipated arrival time: 11:45 AM   (Please note that arrival times may change)    Facility location: [facility]; [address] [place] [address] - Check in location: 1st Floor Sky[address] lobby. Parking information: Self pay parking available in [facility]. The [facility] Ramp is  located across [facility] from the hospital and is connected to the  hospital by a skyway. The skyway access is on Level C of the parking ramp.   parking is available Monday through Friday from 5:30 a.m.-5 p.m.  parking attendants are stationed at Door 2 at the [facility] entrance,  located off of [address]    Important Procedure Reminders:     Prep Instructions:   Instructions on how to prepare for your upcoming procedure are found below. Please read instructions carefully. Deviation from instructions may result in less than desired outcomes and procedure may need to be rescheduled.   If you have additional questions regarding how to prepare for your upcoming procedure, contact our endoscopy pre assessment nurses at [phone] option 3 Monday through Friday 7:00am-5:00pm.      Policy:   The medications used during the procedure will make you sleepy, so you won't be able to drive. On the day of your procedure, please have an adult ready to drive you home and stay with you for the next 6 hours.   You can't use public transportation, ride-share services, or non-medical taxi services without a responsible caregiver. Medical transport services are okay, but a caregiver must be there to receive you at your destination.   Make sure your  and caregiver are confirmed before your procedure.    Day of procedure:  Please keep in mind that arrival times may change. Let your  know there might be a one-hour window for changes.  We ask that you please check in at  the  with your . Your  should remain on campus.  Expect to be at the procedure center for about 1.5-2.5 hours.    Please do not wear jewelry (i.e. earrings, rings, necklaces, watches, etc). Leave your purse, billfold, credit cards, and other valuables at home.   Bring insurance card and ID.     To cancel or reschedule your procedure:   If you need to cancel or reschedule, our endoscopy scheduling team can be reached at [phone], option 1. Monday through Friday, 7:00am-5:00pm.    Medication Reminders:    Please note the following medication holding recommendations:   Oral diabetic medication(s):   Glimepiride (amaryl): HOLD day of procedure.  Metformin (glucophage): HOLD day of procedure.  Injectable diabetic medication(s):   Ozempic (Semaglutide): Weekly dosing of medication.  HOLD 7 days before procedure.  Follow up with managing provider.   Trulicity (Dulaglutide):  Weekly dosing of medication.  HOLD 7 days before procedure.  Follow up with managing provider.     ---------------------------------------------------------------------------------------------------------------------------------------------------------------------------------------------------------------                              Golytely Extended Bowel Prep   Prep instructions for your colonoscopy     [facility]; [address] [place] [address] - Check in location: [address] [facility].   For prep questions, please call: [facility] - [phone] option 3    Please read these instructions carefully at least 7 days prior to your colonoscopy procedure. Be sure to follow all directions completely. The inside of your colon must be clean to allow for a complete examination for the presence of any growths, polyps, and/or abnormalities, as well as their biopsy or removal. A number of tips are included in order to make this part of the procedure as comfortable as possible.    Getting ready   Bowel prep sent  to:    [facility] [address] [place] [facility] [address] [place]  Dulcolax (Bisacodyl) 5mg tablets  Two containers of Polyethylene glycol (Golytely, Colyte, Nulytely, Gavilyte-G)    A nurse will call you to go over your appointment details and prep instructions.     You must arrange for an adult to drive you home after your exam. Your colonoscopy cannot be done unless you have a ride. If you need to use public transportation, someone must ride with you and stay with you for up to 24 hours.       7 days before procedure     Medications that may need to be held before procedure:     GLP-1 agonist medication for diabetes or weight loss: such as Mounjaro (Tirzepatide).  Ozempic (Semaglutide). Rybelsus (Semaglutide), Tirzepatide-Weight Management (Zepbound), Wegovy (Semaglutide) or others, holding times may vary based on how you take this medication. This may be up to a 7 day hold. Our pre assessment nurses will call and discuss holding recommendations 1-2 weeks before scheduled procedure.     Blood thinning and/or anti platelet medications: such as Coumadin, Plavix, Xarelto, Eliquis, Lovenox or others, ask your your prescribing provider about holding recommendations.     If you take insulin for diabetes, ask your prescribing provider for instructions on how to manage this medication while preparing for a colonoscopy.     Stop taking iron (ferrous sulfate), multivitamins that contain iron, and/or fiber supplements (Metamucil, Benefiber, Psyllium husk powder, Fibercon, Bran, etc.) 7 days before procedure.     Stop eating whole kernel corn, popcorn, nuts, and foods that contain seeds. These can stay in the colon for many days and they can clog up the colonoscope.     Begin a low-fiber diet. (See examples below). No Olestra (a fat substitute).   Consume no more than 10-15 grams of fiber each day.       LOW FIBER DIET   You may have: Do not have:    Starches: White bread, rolls, biscuits, croissants, [place] toast, white  flour tortillas, waffles, pancakes, [place] toast; white rice, noodles, pasta, macaroni; cooked and peeled potatoes; plain crackers, saltines; cooked farina or cream of rice; puffed rice, corn flakes, Rice Krispies, Special K      Vegetables: tender cooked and canned, vegetable broths     Fruits and fruit juices: Strained fruit juice, canned fruit without seeds or skin (not pineapple), applesauce, pear sauce, ripe bananas, melons (not watermelon)     Milk products: Milk (plain or flavored), cheese, cottage cheese, yogurt (no berries), custard, ice cream       Proteins: Tender, well-cooked ground beef, lamb, veal, ham, pork, chicken, turkey, fish or organ meat, Tofu, eggs, creamy peanut butter      Fats and condiments: Margarine, butter, oils, mayonnaise, sour cream, salad dressing, plain gravy; spices, cooked herbs; sugar, clear jelly, honey, syrup      Snacks, sweets and drinks: Pretzels, hard candy; plain cakes and cookies (no nuts or seeds); gelatin, plain pudding, sherbet, Popsicles; coffee, tea, carbonated ( fizzy ) drinks  Starches: Breads or rolls that contain nuts, seeds or fruit; whole wheat or whole grain breads that contain more than 2 grams of fiber per serving; cornbread; corn or whole wheat tortillas; potatoes with skin; brown rice, wild rice, quinoa, kasha (buckwheat), and oatmeal      Vegetables: Any raw or steamed vegetables; vegetables with seeds; corn in any form      Fruits and fruit juices: Prunes, prune juice, raisins and other dried fruits, berries and other fruits with seeds, canned pineapple juices with pulp such as orange, grapefruit, pineapple or tomato juice     Milk products: Any yogurt with nuts, seeds or berries      Proteins: Tough, fibrous meats with gristle; cooked dried beans, peas or lentils; crunchy peanut butter     Fats and condiments: Pickles, olives, relish, horseradish; jam, marmalade, preserves      Snacks, sweets and drinks: Popcorn, nuts, seeds, granola, coconut, candies  made with nuts or seeds; all desserts that contain nuts, seeds, raisins and other dried fruits, coconut, whole grains or bran.                                       2 days before procedure       You may have a light, low fiber breakfast and lunch. Begin a clear liquid diet AFTER 1 PM. Do not eat solid foods. (See examples below).    Drink at least eight to ten 8-ounce glasses of water throughout the day  ? ? ? ? ? ? ? ?    Fill one container of Golytely with a full gallon of warm water. Cover and shake until well mixed. Chill in refrigerator until it is time to drink solution.     CLEAR LIQUID DIET:  You can have: Do not have:    Water, tea, coffee (no milk or cream)   Soda pop, Gatorade (not red or purple)   Coconut water   Jell-O, Popsicles (no milk or fruit pieces - not red or purple)   Fat-free soup broth or bouillon   Plain hard candy, such as clear life savers (not red or purple)   Clear juices and fruit-flavored drinks, such as apple juice, white grape juice, Hi-C, and Aldair-Aid (not red or purple)    Milk or milk products such as ice cream, malts or shakes, or coffee creamer   Red or purple drinks of any kind such as cranberry juice, grape juice, or Aldair-Aid. Avoid red or purple Jell-O, Popsicles, sorbet, sherbet and candy.   Juices with pulp such as orange, grapefruit, pineapple, or tomato juice   Cream soups of any kind   Alcohol and beer   Protein drinks or protein powder     Step 1     At 4 PM, take 2 Dulcolax (Bisacodyl) tablets.  At 5 PM, start drinking one 8-ounce glass of Golytely mixture every 15 minutes until the container is HALF empty. Drink each glass quickly. Store the rest in the refrigerator.   Continue to drink clear liquids.      Reminders While Drinking Laxatives:     After you start drinking the solution, stay near a toilet. You may have watery stools (diarrhea), mild cramping, bloating, and nausea. You may want to use Vaseline on the skin around your anus after each bowel movement or use  wet wipes to prevent irritation. Bowel movements will be liquid and dark in color at first and then should turn clear yellow in color. Drinking the prepared solution may make you cold, wearing warm clothing can be helpful.    Some find it helpful to:  For added flavor, include a crystal light lemonade packet in each glass of Golytely, rather than mixing it into the entire prepared mixture.  In between each glass, try sucking on a lemon or a piece of hard candy to help diminish the flavor of the preparation.  Drinking from a straw can help minimize the taste of the prepared mixture.    If you have nausea or vomiting during drinking the solution, rinse your mouth with water and take a 15-30 minute break and then continue drinking solution.       1 day before procedure       Continue on a clear liquid diet. Do not eat solid foods.    Drink at least eight to ten 8-ounce glasses of water throughout the day  ? ? ? ? ? ? ? ?    Step 2     At 4 PM, take 2 Dulcolax (Bisacodyl) tablets.  At 5 PM, start drinking the 2nd half of Golytely mixture. Drink one 8-ounce glass of Golytely mixture every 15 minutes until the container is empty.  Drink each glass quickly.   Continue to drink clear liquids.    Before you go to bed mix the second container of Golytely and place in the refrigerator for the morning.     Day of procedure     Step 3     6 hours before your arrival time, start drinking one 8-ounce glass of Golytely mixture every 15 minutes until the container is HALF empty. You will not drink the entire container. The remaining solution can be discarded.     2 hours before your arrival time stop drinking all liquids, including water.   Do not smoke or swallow anything, including water or gum for at least 2 hours before your arrival time. This is a safety issue. Your procedure could be cancelled if you do not follow directions.  No chewing tobacco 6 hours prior to procedure arrival time.     You may take your necessary morning  medications with sips of water (4 ounces).   Do not take diabetes medicine by mouth until after your exam.  If you have asthma, bring your inhalers.  Please perform your nebulizer treatments and airway clearance therapy in the morning prior to the procedure (if applicable).    Arrive with a responsible adult who can drive you home and stay with you for a minimum of 24 hours. The medications used during the procedure will make you sleepy, so you won't be able to drive yourself home.   You cannot use public transportation, ride-share services, or non-medical taxi services without a responsible caregiver. Medical transport services are okay, but a caregiver must be there to receive you at your destination.  Please check in with your  when you arrive. Drivers should stay on campus.    Expect to be at the procedure center for about 1.5-2.5 hours.    Do not wear jewelry (i.e. earrings, rings, necklaces, watches, etc.). Leave your purse, billfold, credit cards, and other valuables at home.      Bring insurance card and ID.       Answers to Commonly Asked Questions     How soon can I eat after the procedure?  You may resume your normal diet when you feel ready, unless advised otherwise by the doctor performing your procedure. We recommend starting with a light meal.   Do not drink alcohol for 24 hours after your procedure.  You may resume normal activities (work, exercise, etc.) after 24 hours.    How will I feel after the procedure?  It is normal to feel bloated and gassy after your procedure. Walking will help move the air through your colon. You can take non-aspirin pain relievers that contain acetaminophen (Tylenol).  If you are having sedation, we require a responsible adult to take you home for your safety. The sedation medicines used to relax you during the procedure can impair your judgement and reaction time and make you forgetful and possibly a little unsteady.  Do not drive, make any important decisions, or  sign any legal documents for 24 hours after your procedure.    When will I get my test results?  You should have your procedure results and any lab results (if applicable) by letter, MyChart message, or phone call within 2 weeks. If you have any questions, please call the doctor that referred you for the procedure.    How do I know if my colon is cleaned out?   After completing the bowel prep, your bowel movements should be all liquid and yellow. Your bowel movements will look similar to urine in the toilet. If there are pieces of stool (poop) in the toilet, or if you can't see to the bottom of the toilet, please call our office for advice. Call [phone] and ask to speak with a nurse.    Why is the Golytely bowel prep taken in several steps?   The stool is flushed out by a large wave of fluid going through the colon. Just sipping a large volume of the solution will not achieve the desired result. Studies have shown that two smaller waves (or more in some cases) are better than one large one.      What if I need to cancel or reschedule my procedure?  Contact our endoscopy scheduling team at [phone], option 1. Monday through Friday, 7:00am-5:00pm.

## 2025-05-01 NOTE — TELEPHONE ENCOUNTER
Pre visit planning completed.      Procedure details:    Patient scheduled for Colonoscopy on 5/16/25.     Arrival time: 1145. Procedure time 1230    Facility location: [facility]; [address] [place] [address] [place] [address]. Check in location: [facility].     Sedation type: Conscious sedation     Pre op exam needed? No.    Indication for procedure: hx of polyps      Chart review:     Electronic implanted devices? No    Recent diagnosis of diverticulitis within the last 6 weeks? No      Medication review:    Diabetic? Yes. Oral diabetic medications: Glimepiride (amaryl): HOLD day of procedure.  Metformin (glucophage): HOLD day of procedure.  Diabetic injectables:   - Ozempic (Semaglutide). Weekly dosing of medication.  HOLD 7 days before procedure.  Follow up with managing provider.   - Trulicity (Dulaglutide).  Weekly dosing of medication.  HOLD 7 days before procedure.  Follow up with managing provider.   (Ozempic is the more current order, trulicity still in med list)    Anticoagulants? No    Weight loss medication/injectable? No. Patient is on GLP-1 medication but for DM (see above).    Other medication HOLDING recommendations:  N/A      Prep for procedure:     Bowel prep recommendation: Extended Golytely. Bowel prep sent to    [facility] [address] [place] [facility] [address] [address]    Due to: diabetes and GLP-1 agonist medication noted in chart    Procedure information and instructions sent via letter         Stephany Martinez RN  Endoscopy Procedure Pre Assessment   [phone] option 3

## 2025-05-01 NOTE — TELEPHONE ENCOUNTER
Attempted to contact patient in order to complete pre assessment questions.     Patient answered but stated he is unable to talk as he is out of town and will not be returning until 5/7.  Advised patient to return call to [phone] option 3.    Callback communication & prep instructions were sent via  secure [employer] link (text/email).      Valerie Curtis RN  Endoscopy Procedure Pre-Assessment RN  [phone], option 3

## 2025-05-08 DIAGNOSIS — E11.69 TYPE 2 DIABETES MELLITUS WITH OTHER SPECIFIED COMPLICATION, WITHOUT LONG-TERM CURRENT USE OF INSULIN (H): ICD-10-CM

## 2025-05-08 RX ORDER — SEMAGLUTIDE 1.34 MG/ML
1 INJECTION, SOLUTION SUBCUTANEOUS
Qty: 9 ML | Refills: 0 | Status: SHIPPED | OUTPATIENT
Start: 2025-05-08

## 2025-05-16 ENCOUNTER — RESULTS FOLLOW-UP (OUTPATIENT)
Dept: GASTROENTEROLOGY | Facility: CLINIC | Age: 85
End: 2025-05-16

## 2025-05-16 ENCOUNTER — HOSPITAL ENCOUNTER (OUTPATIENT)
Facility: CLINIC | Age: 85
Discharge: HOME | End: 2025-05-16
Attending: COLON & RECTAL SURGERY | Admitting: COLON & RECTAL SURGERY
Payer: MEDICARE

## 2025-05-16 VITALS
OXYGEN SATURATION: 99 % | SYSTOLIC BLOOD PRESSURE: 129 MMHG | DIASTOLIC BLOOD PRESSURE: 61 MMHG | HEART RATE: 64 BPM | RESPIRATION RATE: 14 BRPM

## 2025-05-16 LAB
COLONOSCOPY: NORMAL
LAB DIRECTOR COMMENTS: NORMAL
LAB DIRECTOR DISCLAIMER: NORMAL
LAB DIRECTOR INTERPRETATION: NORMAL
LAB DIRECTOR METHODOLOGY: NORMAL
LAB DIRECTOR RESULTS: NORMAL
SPECIMEN TYPE: NORMAL

## 2025-05-16 PROCEDURE — G0500 MOD SEDAT ENDO SERVICE >5YRS: HCPCS | Performed by: STUDENT IN AN ORGANIZED HEALTH CARE EDUCATION/TRAINING PROGRAM

## 2025-05-16 PROCEDURE — 45380 COLONOSCOPY AND BIOPSY: CPT | Performed by: STUDENT IN AN ORGANIZED HEALTH CARE EDUCATION/TRAINING PROGRAM

## 2025-05-16 PROCEDURE — 99153 MOD SED SAME PHYS/QHP EA: CPT | Performed by: STUDENT IN AN ORGANIZED HEALTH CARE EDUCATION/TRAINING PROGRAM

## 2025-05-16 PROCEDURE — G0452 MOLECULAR PATHOLOGY INTERPR: HCPCS | Mod: 26 | Performed by: PATHOLOGY

## 2025-05-16 PROCEDURE — 250N000011 HC RX IP 250 OP 636: Performed by: STUDENT IN AN ORGANIZED HEALTH CARE EDUCATION/TRAINING PROGRAM

## 2025-05-16 PROCEDURE — 88341 IMHCHEM/IMCYTCHM EA ADD ANTB: CPT | Mod: TC | Performed by: STUDENT IN AN ORGANIZED HEALTH CARE EDUCATION/TRAINING PROGRAM

## 2025-05-16 PROCEDURE — 45385 COLONOSCOPY W/LESION REMOVAL: CPT | Performed by: STUDENT IN AN ORGANIZED HEALTH CARE EDUCATION/TRAINING PROGRAM

## 2025-05-16 PROCEDURE — 87624 HPV HI-RISK TYP POOLED RSLT: CPT | Performed by: STUDENT IN AN ORGANIZED HEALTH CARE EDUCATION/TRAINING PROGRAM

## 2025-05-16 RX ORDER — FENTANYL CITRATE 50 UG/ML
INJECTION, SOLUTION INTRAMUSCULAR; INTRAVENOUS PRN
Status: DISCONTINUED | OUTPATIENT
Start: 2025-05-16 | End: 2025-05-16 | Stop reason: HOSPADM

## 2025-05-16 ASSESSMENT — ACTIVITIES OF DAILY LIVING (ADL)
ADLS_ACUITY_SCORE: 41

## 2025-05-16 NOTE — H&P
Pre-Endoscopy History and Physical     Jaguar Tristan MRN# [id number]   YOB: 1940 Age: 84 year old     Date of Procedure: 05/16/25  Primary care provider: Henrique Boles  Type of Endoscopy: Colonoscopy  Reason for Procedure: surveillance personal history of polyps  Type of Anesthesia Anticipated: Moderate Sedation    HPI:    Jaguar is a 84 year old male who will be undergoing the above procedure.      Prior colonoscopy:   2023 [facility]  10 small polyps throughout colon,  F100 V1  A: Large intestine, cecum, polypectomy:  -Tubular adenoma, no evidence of high-grade dysplasia or invasive carcinoma     B: Large intestine, ascending, polypectomy x 2:  -Tubular adenomas (2); no evidence of high-grade dysplasia or invasive carcinoma     C: Large intestine, transverse, polypectomy x 2:  -Tubular adenomas (2); no evidence of high-grade dysplasia or invasive carcinoma     D: Large intestine, descending, polypectomy:  -Tubular adenoma, no evidence of high-grade dysplasia or invasive carcinoma     E: Large intestine, rectum, polypectomy x 4:  -Multipiece tissue sample showing tubular adenoma (no evidence of high-grade dysplasia or malignancy), hyperplastic polyp and nonneoplastic colonic mucosa    Previous normal    A history and physical has been performed. The patient's medications and allergies have been reviewed. The risks and benefits of the procedure and the sedation options and risks were discussed with the patient.  All questions were answered and informed consent was obtained.      He denies a personal or family history of anesthesia complications or bleeding disorders.   denies a family history of CRC.  denies a family history of IBD.  denies changes in bowel habits.  reports blood in stool - attributes to hemorrhoid. Not new    Allergies   Allergen Reactions    Other  [No Clinical Screening - See Comments]      PN: LW FI1: NKA LW FI2: nka  PN: LW CM1: Contrast Adverse Reaction - None Reaction  :  PN: LW Other1: -NKA      Allergy to Latex: NO   Intolerances: NO     No current facility-administered medications for this encounter.       Patient Active Problem List   Diagnosis    Benign essential hypertension    Rosacea    Nephrolithiasis    Hyperlipidemia LDL goal <100    Erectile dysfunction    Prostate cancer (H)    History of myocardial infarction    Coronary artery disease involving native coronary artery of native heart without angina pectoris    Type 2 diabetes mellitus with other specified complication, without long-term current use of insulin (H)    Malignant neoplasm metastatic to bone (H)        Past Medical History:   Diagnosis Date    Coronary artery disease 02/22/2016    Cardiac cath 2016: JAYME x2 to RCA    Diabetes mellitus (H) 2005    Elevated PSA 2009    bx neg Dr. Dill    Erectile dysfunction     History of colonoscopy 2012    nl    Hypercholesteremia 2002    Hypertension 2002    Nephrolithiasis 2007    Prostate cancer (H) 2013    chemotherapy for metastasis to spine 2016 (cleared), prostatectomy and radiation 2013    Rosacea         Past Surgical History:   Procedure Laterality Date    COLONOSCOPY  1/20/2012    Procedure:COLONOSCOPY; COLONOSCOPY; Surgeon:ELSY GARCIA; Location: GI    COLONOSCOPY N/A 12/1/2023    Procedure: Colonoscopy;  Surgeon: Erin Maher MD;  Location:  GI    COLONOSCOPY N/A 12/1/2023    Procedure: COLONOSCOPY, WITH POLYPECTOMY AND BIOPSY;  Surgeon: Erin Maher MD;  Location: [facility]    CORONARY ANGIOGRAPHY ADULT ORDER      HEART CATH, ANGIOPLASTY  02/22/2016    JAYME x2 to RCA    LEG SURGERY  2005    robotic surgery for prostate cancer  2013    Dr. Dudley    TONSILLECTOMY  5       Social History     Tobacco Use    Smoking status: Never    Smokeless tobacco: Never   Substance Use Topics    Alcohol use: Yes     Alcohol/week: 1.0 - 2.0 standard drink of alcohol     Types: 1 - 2 Glasses of wine per week     Comment: 3-4 glasses wine per week  "      Family History   Problem Relation Age of Onset    Cancer Mother        REVIEW OF SYSTEMS:     5 point ROS negative except as noted above in HPI, including Gen., Resp., CV, GI &  system review.      PHYSICAL EXAM:   BP (!) 157/67   Pulse 67   SpO2 99%  Estimated body mass index is 29.84 kg/m  as calculated from the following:    Height as of 4/4/25: 1.778 m (5' 10\").    Weight as of 4/4/25: 94.3 kg (208 lb).   All Vitals have been reviewed.    GENERAL APPEARANCE: healthy and alert  MENTAL STATUS: alert  AIRWAY EXAM: Mallampatti Class II (visualization of the soft palate, fauces, and uvula)  RESP: lungs clear to auscultation - no rales, rhonchi or wheezes  CV: regular rates and rhythm      IMPRESSION   ASA Class 2 - Mild systemic disease        PLAN:     Plan for colonoscopy. We discussed the risks, benefits and alternatives and the patient wished to proceed.          Cindy Mancilla MD  [facility]  Phone: [phone]  Fax: [phone]  May 16, 2025   "

## 2025-05-22 LAB
PATH REPORT.COMMENTS IMP SPEC: ABNORMAL
PATH REPORT.COMMENTS IMP SPEC: YES
PATH REPORT.FINAL DX SPEC: ABNORMAL
PATH REPORT.GROSS SPEC: ABNORMAL
PATH REPORT.MICROSCOPIC SPEC OTHER STN: ABNORMAL
PATH REPORT.RELEVANT HX SPEC: ABNORMAL
PHOTO IMAGE: ABNORMAL

## 2025-05-22 PROCEDURE — 88342 IMHCHEM/IMCYTCHM 1ST ANTB: CPT | Mod: 26 | Performed by: PATHOLOGY

## 2025-05-22 PROCEDURE — 88305 TISSUE EXAM BY PATHOLOGIST: CPT | Mod: 26 | Performed by: PATHOLOGY

## 2025-05-22 PROCEDURE — 88341 IMHCHEM/IMCYTCHM EA ADD ANTB: CPT | Mod: 26 | Performed by: PATHOLOGY

## 2025-05-30 ENCOUNTER — HOSPITAL ENCOUNTER (OUTPATIENT)
Dept: CT IMAGING | Facility: CLINIC | Age: 85
Discharge: HOME | End: 2025-05-30
Attending: STUDENT IN AN ORGANIZED HEALTH CARE EDUCATION/TRAINING PROGRAM | Admitting: STUDENT IN AN ORGANIZED HEALTH CARE EDUCATION/TRAINING PROGRAM
Payer: MEDICARE

## 2025-05-30 DIAGNOSIS — C21.0 SQUAMOUS CELL CARCINOMA OF ANUS (H): ICD-10-CM

## 2025-05-30 PROBLEM — D12.6 ADENOMATOUS POLYP OF COLON: Status: ACTIVE | Noted: 2025-05-30

## 2025-05-30 LAB
CREAT BLD-MCNC: 1.1 MG/DL (ref 0.7–1.2)
EGFRCR SERPLBLD CKD-EPI 2021: >60 ML/MIN/1.73M2

## 2025-05-30 PROCEDURE — 71260 CT THORAX DX C+: CPT

## 2025-05-30 PROCEDURE — 250N000011 HC RX IP 250 OP 636: Performed by: STUDENT IN AN ORGANIZED HEALTH CARE EDUCATION/TRAINING PROGRAM

## 2025-05-30 PROCEDURE — 250N000009 HC RX 250: Performed by: STUDENT IN AN ORGANIZED HEALTH CARE EDUCATION/TRAINING PROGRAM

## 2025-05-30 PROCEDURE — 82565 ASSAY OF CREATININE: CPT

## 2025-05-30 RX ORDER — IOPAMIDOL 755 MG/ML
103 INJECTION, SOLUTION INTRAVASCULAR ONCE
Status: COMPLETED | OUTPATIENT
Start: 2025-05-30 | End: 2025-05-30

## 2025-05-30 RX ADMIN — IOPAMIDOL 103 ML: 755 INJECTION, SOLUTION INTRAVENOUS at 16:26

## 2025-05-30 RX ADMIN — SODIUM CHLORIDE 60 ML: 9 INJECTION, SOLUTION INTRAVENOUS at 16:26

## 2025-05-31 ENCOUNTER — HOSPITAL ENCOUNTER (OUTPATIENT)
Dept: MRI IMAGING | Facility: CLINIC | Age: 85
Discharge: HOME | End: 2025-05-31
Attending: STUDENT IN AN ORGANIZED HEALTH CARE EDUCATION/TRAINING PROGRAM | Admitting: STUDENT IN AN ORGANIZED HEALTH CARE EDUCATION/TRAINING PROGRAM
Payer: MEDICARE

## 2025-05-31 DIAGNOSIS — C21.0 SQUAMOUS CELL CARCINOMA OF ANUS (H): ICD-10-CM

## 2025-05-31 PROCEDURE — 72197 MRI PELVIS W/O & W/DYE: CPT

## 2025-05-31 PROCEDURE — A9585 GADOBUTROL INJECTION: HCPCS | Performed by: STUDENT IN AN ORGANIZED HEALTH CARE EDUCATION/TRAINING PROGRAM

## 2025-05-31 PROCEDURE — 255N000002 HC RX 255 OP 636: Performed by: STUDENT IN AN ORGANIZED HEALTH CARE EDUCATION/TRAINING PROGRAM

## 2025-05-31 RX ORDER — GADOBUTROL 604.72 MG/ML
9 INJECTION INTRAVENOUS ONCE
Status: COMPLETED | OUTPATIENT
Start: 2025-05-31 | End: 2025-05-31

## 2025-05-31 RX ADMIN — GADOBUTROL 9 ML: 604.72 INJECTION INTRAVENOUS at 15:14

## 2025-06-05 ENCOUNTER — OFFICE VISIT (OUTPATIENT)
Dept: CARDIOLOGY | Facility: CLINIC | Age: 85
End: 2025-06-05
Payer: MEDICARE

## 2025-06-05 VITALS
BODY MASS INDEX: 29.06 KG/M2 | DIASTOLIC BLOOD PRESSURE: 74 MMHG | SYSTOLIC BLOOD PRESSURE: 121 MMHG | HEART RATE: 74 BPM | WEIGHT: 203 LBS | HEIGHT: 70 IN | OXYGEN SATURATION: 99 %

## 2025-06-05 DIAGNOSIS — I25.10 CORONARY ARTERY DISEASE INVOLVING NATIVE CORONARY ARTERY OF NATIVE HEART WITHOUT ANGINA PECTORIS: ICD-10-CM

## 2025-06-05 DIAGNOSIS — E78.5 HYPERLIPIDEMIA LDL GOAL <100: Primary | ICD-10-CM

## 2025-06-05 DIAGNOSIS — Z95.5 PRESENCE OF DRUG COATED STENT IN RIGHT CORONARY ARTERY: ICD-10-CM

## 2025-06-05 DIAGNOSIS — I21.4 NON Q WAVE MYOCARDIAL INFARCTION (H): ICD-10-CM

## 2025-06-05 NOTE — LETTER
6/5/2025    Henrique Boles MD  [address] [facility] [address]  [facility] [address]    RE: Jaguar PERDUE Azalea       Dear Colleague,     I had the pleasure of seeing Jaguar Tristan in the [facility].  HPI and Plan:   It is my pleasure to see your patient Reverend Garrett Tristan in follow-up.  This is a very pleasant 84-year-old patient with a past history of non-Q wave myocardial infarct and had 2 drug-eluting stents placed in the distal right coronary artery.  This patient also has a history of mixed hyperlipidemia.  He falls within secondary prevention guidelines for lipid management.  He also has type 2 diabetes mellitus.  He is a survivor of stage IV prostate cancer.  He has been investigated at present for possible anal cancer but that diagnosis is not yet confirmed.    With that background in mind he is feeling well with no chest pains no chest pressure and no unusual shortness of breath that would suggest angina pectoris.  No occasional muscular type discomfort in the left upper chest area and when he rubs on it the pain goes away which sounds distinctly noncardiac.  His last lipid profile which was performed 2 months ago showed that his LDL was 59 mild HDL deficiency of 34 and normal triglycerides of 126 with a total cholesterol of 118 and normal liver function tests indicating no hepatic toxicity.  He has become quite sedentary and unfit which probably accounts for his HDL deficiency.  He also has gained some weight which would also tend to lower HDL.    Impression:  1.  Coronary artery disease status post prior non-Q wave myocardial infarction stenting of the right coronary artery.  Patient is asymptomatic with respect to coronary artery disease with no symptoms suggestive of angina pectoris.  2.  Apart from mild HDL deficiency due to sedentary lifestyle and weight gain, his lipid profile is within secondary prevention guidelines with no evidence of hepatic toxicity  3.  Normotensive.  4.  Type 2 diabetes  mellitus and is now on Ozempic.    Plan:  1.  Lifestyle changes will be important including adhering to a Mediterranean style diet and exercising.  Exercise and weight loss in particular will raise his HDL.  We will recheck his lipids in 6 months time having adopted these lifestyle changes.  2.  I will see the patient back again in 1 years time and I will repeat his lipid profile and basic metabolic profile at that time.    As always the patient has been told to contact me if he has any questions or any concerns.    The longitudinal plan of care for the diagnosis(es)/condition(s) as documented were addressed during this visit. Due to the added complexity in care, I will continue to support Jaguar in the subsequent management and with ongoing continuity of care.    Lg Mancilla MD, FACC, FRCPI      Orders Placed This Encounter   Procedures     Lipid Profile     ALT     Lipid Profile     ALT     Follow-Up with Cardiology       No orders of the defined types were placed in this encounter.      Medications Discontinued During This Encounter   Medication Reason     bisacodyl (DULCOLAX) 5 MG EC tablet Therapy completed (No AVS)     polyethylene glycol (GOLYTELY) 236 g suspension Therapy completed (No AVS)         Encounter Diagnoses   Name Primary?     Coronary artery disease involving native coronary artery of native heart without angina pectoris      Hyperlipidemia LDL goal <100 Yes     Presence of drug coated stent in right coronary artery      Non Q wave myocardial infarction (H)        CURRENT MEDICATIONS:  Current Outpatient Medications   Medication Sig Dispense Refill     ACCU-CHEK GUIDE test strip USE TO TEST BLOOD GLUCOSE 1 TIME DAILY 100 strip 3     Alcohol Swabs (ALCOHOL PADS) 70 % PADS 1 pad 2 times daily For use after pricking finger for checking blood sugar at home. 100 each 11     aspirin 81 MG tablet Take 81 mg by mouth daily       atorvastatin (LIPITOR) 80 MG tablet TAKE 1 TABLET BY MOUTH EVERY DAY  90 tablet 1     blood glucose (NO BRAND SPECIFIED) lancets standard Use to test blood sugar 2 times daily as directed. 100 each 11     blood glucose monitoring (NO BRAND SPECIFIED) test strip Use to test blood sugars 2 times daily as directed 100 strip 11     Blood Glucose Monitoring Suppl (ACCU-CHEK GUIDE) w/Device KIT 1 each by In Vitro route daily USE TO TEST BLOOD SUGAR 1 TIMES DAILY OR AS DIRECTED. PREFERRED BLOOD GLUCOSE METER OR SUPPLIES TO ACCOMPANY: BLOOD GLUCOSE MONITOR BRANDS: PER INSURANCE. 1 kit 0     Blood Pressure Monitoring KIT Use to test blood sugars 2 times daily as directed 1 kit 0     carvedilol (COREG) 12.5 MG tablet TAKE 1 TABLET (12.5 MG) BY MOUTH 2 TIMES DAILY. PLEASE SCHEDULE FOLLOW-UP OFFICE VISIT AND LABS. 180 tablet 1     glimepiride (AMARYL) 4 MG tablet TAKE 2 TABLETS BY MOUTH EVERY DAY IN THE MORNING BEFORE BREAKFAST 180 tablet 1     Glucosamine-Chondroitin (OSTEO BI-FLEX REGULAR STRENGTH PO) Take 1 tablet by mouth as needed        hydrocortisone valerate (WEST-ANA PAULA) 0.2 % ointment Apply sparingly to affected area at face two times daily for no more than 14 days. 15 g 1     lisinopril-hydrochlorothiazide (ZESTORETIC) 20-12.5 MG tablet TAKE 1 TABLET BY MOUTH EVERY DAY 90 tablet 0     metFORMIN (GLUCOPHAGE XR) 500 MG 24 hr tablet TAKE 2 TABLETS BY MOUTH TWICE A DAY WITH MEALS 360 tablet 0     Microlet Lancets MISC USE TO TEST BLOOD GLUCOSE 1 TIME DAILY 100 each 3     mineral oil-hydrophilic petrolatum (AQUAPHOR) external ointment Apply topically as needed for dry skin or other. 198 g 1     Multiple Vitamins-Minerals (PRESERVISION AREDS 2 PO) Take 1 tablet by mouth 2 times daily       Semaglutide, 1 MG/DOSE, (OZEMPIC, 1 MG/DOSE,) 4 MG/3ML pen INJECT 1 MG SUBCUTANEOUSLY EVERY 7 DAYS 9 mL 0     triamcinolone (KENALOG) 0.1 % external ointment Apply topically 2 times daily. On affected eczematous patches 80 g 0     VITAMIN E PO Take 2 tablets by mouth daily       Cyanocobalamin (VITAMIN B-12  PO) Take 1 tablet by mouth 2 times daily (Patient not taking: Reported on 6/5/2025)         ALLERGIES     Allergies   Allergen Reactions     Other  [No Clinical Screening - See Comments]      PN: LW FI1: NKA LW FI2: nka  PN: LW CM1: Contrast Adverse Reaction - None Reaction :  PN: LW Other1: -NKA       PAST MEDICAL HISTORY:  Past Medical History:   Diagnosis Date     Coronary artery disease 02/22/2016    Cardiac cath 2016: JAYME x2 to RCA     Diabetes mellitus (H) 2005     Elevated PSA 2009    bx neg Dr. Dill     Erectile dysfunction      History of colonoscopy 2012    nl     Hypercholesteremia 2002     Hypertension 2002     Nephrolithiasis 2007     Prostate cancer (H) 2013    chemotherapy for metastasis to spine 2016 (cleared), prostatectomy and radiation 2013     Rosacea        PAST SURGICAL HISTORY:  Past Surgical History:   Procedure Laterality Date     COLONOSCOPY  1/20/2012    Procedure:COLONOSCOPY; COLONOSCOPY; Surgeon:ELSY GARCIA; Location:[facility]     COLONOSCOPY N/A 12/1/2023    Procedure: Colonoscopy;  Surgeon: Erin Maher MD;  Location: [facility]     COLONOSCOPY N/A 12/1/2023    Procedure: COLONOSCOPY, WITH POLYPECTOMY AND BIOPSY;  Surgeon: Erin Maher MD;  Location: [facility]     COLONOSCOPY N/A 5/16/2025    Procedure: Colonoscopy;  Surgeon: Cindy Mancilla MD;  Location: [facility]     COLONOSCOPY N/A 5/16/2025    Procedure: COLONOSCOPY, WITH POLYPECTOMY AND BIOPSY;  Surgeon: Cindy Mancilla MD;  Location: [facility]     CORONARY ANGIOGRAPHY ADULT ORDER       HEART CATH, ANGIOPLASTY  02/22/2016    JAYME x2 to RCA     LEG SURGERY  2005     robotic surgery for prostate cancer  2013    Dr. Dudley     TONSILLECTOMY  5       FAMILY HISTORY:  Family History   Problem Relation Age of Onset     Cancer Mother        SOCIAL HISTORY:  Social History     Socioeconomic History     Marital status:      Spouse name: None     Number of children: None     Years of education: None     Highest education  level: None   Occupational History     Occupation: [employer]   Tobacco Use     Smoking status: Never     Smokeless tobacco: Never   Vaping Use     Vaping status: Never Used   Substance and Sexual Activity     Alcohol use: Yes     Alcohol/week: 1.0 - 2.0 standard drink of alcohol     Types: 1 - 2 Glasses of wine per week     Comment: 3-4 glasses wine per week     Drug use: No     Sexual activity: Yes     Partners: Female   Other Topics Concern     Caffeine Concern No     Comment: no caffeine     Sleep Concern No     Stress Concern No     Weight Concern No     Special Diet Yes     Comment: cut out pop and sodium     Exercise Yes     Comment: walking a lot, cardio rehab   Social History Narrative    so I said we could give him an Accu-Chek Simi from the clinic (I may drop it off with you on 3/2/16).           Social Drivers of Health     Financial Resource Strain: Low Risk  (4/4/2025)    Financial Resource Strain      Within the past 12 months, have you or your family members you live with been unable to get utilities (heat, electricity) when it was really needed?: No   Food Insecurity: Low Risk  (4/4/2025)    Food Insecurity      Within the past 12 months, did you worry that your food would run out before you got money to buy more?: No      Within the past 12 months, did the food you bought just not last and you didn t have money to get more?: No   Transportation Needs: Low Risk  (4/4/2025)    Transportation Needs      Within the past 12 months, has lack of transportation kept you from medical appointments, getting your medicines, non-medical meetings or appointments, work, or from getting things that you need?: No   Physical Activity: Unknown (4/4/2025)    Exercise Vital Sign      Days of Exercise per Week: 3 days   Stress: No Stress Concern Present (4/4/2025)    [employer] [place] [employer] - Occupational Stress Questionnaire      Feeling of Stress : Not at all   Social Connections: Unknown  "(4/4/2025)    Social Connection and Isolation Panel [NHANES]      Frequency of Social Gatherings with Friends and Family: More than three times a week   Interpersonal Safety: Low Risk  (4/4/2025)    Interpersonal Safety      Do you feel physically and emotionally safe where you currently live?: Yes      Within the past 12 months, have you been hit, slapped, kicked or otherwise physically hurt by someone?: No      Within the past 12 months, have you been humiliated or emotionally abused in other ways by your partner or ex-partner?: No   Housing Stability: Low Risk  (4/4/2025)    Housing Stability      Do you have housing? : Yes      Are you worried about losing your housing?: No       Review of Systems:  Skin:          Eyes:         ENT:         Respiratory:          Cardiovascular:         Gastroenterology:        Genitourinary:         Musculoskeletal:         Neurologic:         Psychiatric:         Heme/Lymph/Imm:         Endocrine:           Physical Exam:  Vitals: /74   Pulse 74   Ht 1.778 m (5' 10\")   Wt 92.1 kg (203 lb)   SpO2 99%   BMI 29.13 kg/m      Constitutional:  cooperative, alert and oriented, well developed, well nourished, in no acute distress overweight      Skin:  warm and dry to the touch, no apparent skin lesions or masses noted          Head:  normocephalic, no masses or lesions        Eyes:  pupils equal and round        Lymph:      ENT:  no pallor or cyanosis, dentition good        Neck:           Respiratory:  normal breath sounds, clear to auscultation, normal A-P diameter, normal symmetry, normal respiratory excursion, no use of accessory muscles         Cardiac: regular rhythm, normal S1 and S2, apical impulse not displaced   S4 no presence of murmur          pulses full and equal, no bruits auscultated                                        GI:  not assessed this visit        Extremities and Muscular Skeletal:  no deformities, clubbing, cyanosis, erythema observed, no edema    "      right radial site clean without hum or bruit    Neurological:  no gross motor deficits, affect appropriate        Psych:  Alert and Oriented x 3        CC  Henrique Boles MD  [address]  [place] [address]                  Thank you for allowing me to participate in the care of your patient.      Sincerely,     gL Mancilla MD, MD     [facility]  cc:   Henrique Boles MD  [address]  [place] [address]

## 2025-06-05 NOTE — PROGRESS NOTES
HPI and Plan:   It is my pleasure to see your patient Reverend Garrett Tristan in follow-up.  This is a very pleasant 84-year-old patient with a past history of non-Q wave myocardial infarct and had 2 drug-eluting stents placed in the distal right coronary artery.  This patient also has a history of mixed hyperlipidemia.  He falls within secondary prevention guidelines for lipid management.  He also has type 2 diabetes mellitus.  He is a survivor of stage IV prostate cancer.  He has been investigated at present for possible anal cancer but that diagnosis is not yet confirmed.    With that background in mind he is feeling well with no chest pains no chest pressure and no unusual shortness of breath that would suggest angina pectoris.  No occasional muscular type discomfort in the left upper chest area and when he rubs on it the pain goes away which sounds distinctly noncardiac.  His last lipid profile which was performed 2 months ago showed that his LDL was 59 mild HDL deficiency of 34 and normal triglycerides of 126 with a total cholesterol of 118 and normal liver function tests indicating no hepatic toxicity.  He has become quite sedentary and unfit which probably accounts for his HDL deficiency.  He also has gained some weight which would also tend to lower HDL.    Impression:  1.  Coronary artery disease status post prior non-Q wave myocardial infarction stenting of the right coronary artery.  Patient is asymptomatic with respect to coronary artery disease with no symptoms suggestive of angina pectoris.  2.  Apart from mild HDL deficiency due to sedentary lifestyle and weight gain, his lipid profile is within secondary prevention guidelines with no evidence of hepatic toxicity  3.  Normotensive.  4.  Type 2 diabetes mellitus and is now on Ozempic.    Plan:  1.  Lifestyle changes will be important including adhering to a Mediterranean style diet and exercising.  Exercise and weight loss in particular will raise his HDL.   We will recheck his lipids in 6 months time having adopted these lifestyle changes.  2.  I will see the patient back again in 1 years time and I will repeat his lipid profile and basic metabolic profile at that time.    As always the patient has been told to contact me if he has any questions or any concerns.    The longitudinal plan of care for the diagnosis(es)/condition(s) as documented were addressed during this visit. Due to the added complexity in care, I will continue to support [facility] in the subsequent management and with ongoing continuity of care.    Lg Mancilla MD, FACC, FRCPI      Orders Placed This Encounter   Procedures    Lipid Profile    ALT    Lipid Profile    ALT    Follow-Up with Cardiology       No orders of the defined types were placed in this encounter.      Medications Discontinued During This Encounter   Medication Reason    bisacodyl (DULCOLAX) 5 MG EC tablet Therapy completed (No AVS)    polyethylene glycol (GOLYTELY) 236 g suspension Therapy completed (No AVS)         Encounter Diagnoses   Name Primary?    Coronary artery disease involving native coronary artery of native heart without angina pectoris     Hyperlipidemia LDL goal <100 Yes    Presence of drug coated stent in right coronary artery     Non Q wave myocardial infarction (H)        CURRENT MEDICATIONS:  Current Outpatient Medications   Medication Sig Dispense Refill    ACCU-CHEK GUIDE test strip USE TO TEST BLOOD GLUCOSE 1 TIME DAILY 100 strip 3    Alcohol Swabs (ALCOHOL PADS) 70 % PADS 1 pad 2 times daily For use after pricking finger for checking blood sugar at home. 100 each 11    aspirin 81 MG tablet Take 81 mg by mouth daily      atorvastatin (LIPITOR) 80 MG tablet TAKE 1 TABLET BY MOUTH EVERY DAY 90 tablet 1    blood glucose (NO BRAND SPECIFIED) lancets standard Use to test blood sugar 2 times daily as directed. 100 each 11    blood glucose monitoring (NO BRAND SPECIFIED) test strip Use to test blood sugars 2 times  daily as directed 100 strip 11    Blood Glucose Monitoring Suppl (ACCU-CHEK GUIDE) w/Device KIT 1 each by In Vitro route daily USE TO TEST BLOOD SUGAR 1 TIMES DAILY OR AS DIRECTED. PREFERRED BLOOD GLUCOSE METER OR SUPPLIES TO ACCOMPANY: BLOOD GLUCOSE MONITOR BRANDS: PER INSURANCE. 1 kit 0    Blood Pressure Monitoring KIT Use to test blood sugars 2 times daily as directed 1 kit 0    carvedilol (COREG) 12.5 MG tablet TAKE 1 TABLET (12.5 MG) BY MOUTH 2 TIMES DAILY. PLEASE SCHEDULE FOLLOW-UP OFFICE VISIT AND LABS. 180 tablet 1    glimepiride (AMARYL) 4 MG tablet TAKE 2 TABLETS BY MOUTH EVERY DAY IN THE MORNING BEFORE BREAKFAST 180 tablet 1    Glucosamine-Chondroitin (OSTEO BI-FLEX REGULAR STRENGTH PO) Take 1 tablet by mouth as needed       hydrocortisone valerate (WEST-ANA PAULA) 0.2 % ointment Apply sparingly to affected area at face two times daily for no more than 14 days. 15 g 1    lisinopril-hydrochlorothiazide (ZESTORETIC) 20-12.5 MG tablet TAKE 1 TABLET BY MOUTH EVERY DAY 90 tablet 0    metFORMIN (GLUCOPHAGE XR) 500 MG 24 hr tablet TAKE 2 TABLETS BY MOUTH TWICE A DAY WITH MEALS 360 tablet 0    Microlet Lancets MISC USE TO TEST BLOOD GLUCOSE 1 TIME DAILY 100 each 3    mineral oil-hydrophilic petrolatum (AQUAPHOR) external ointment Apply topically as needed for dry skin or other. 198 g 1    Multiple Vitamins-Minerals (PRESERVISION AREDS 2 PO) Take 1 tablet by mouth 2 times daily      Semaglutide, 1 MG/DOSE, (OZEMPIC, 1 MG/DOSE,) 4 MG/3ML pen INJECT 1 MG SUBCUTANEOUSLY EVERY 7 DAYS 9 mL 0    triamcinolone (KENALOG) 0.1 % external ointment Apply topically 2 times daily. On affected eczematous patches 80 g 0    VITAMIN E PO Take 2 tablets by mouth daily      Cyanocobalamin (VITAMIN B-12 PO) Take 1 tablet by mouth 2 times daily (Patient not taking: Reported on 6/5/2025)         ALLERGIES     Allergies   Allergen Reactions    Other  [No Clinical Screening - See Comments]      PN: LW FI1: NKA LW FI2: nka  PN: LW CM1: Contrast  Adverse Reaction - None Reaction :  PN: LW Other1: -NKA       PAST MEDICAL HISTORY:  Past Medical History:   Diagnosis Date    Coronary artery disease 02/22/2016    Cardiac cath 2016: JAYME x2 to RCA    Diabetes mellitus (H) 2005    Elevated PSA 2009    bx neg Dr. Dill    Erectile dysfunction     History of colonoscopy 2012    nl    Hypercholesteremia 2002    Hypertension 2002    Nephrolithiasis 2007    Prostate cancer (H) 2013    chemotherapy for metastasis to spine 2016 (cleared), prostatectomy and radiation 2013    Rosacea        PAST SURGICAL HISTORY:  Past Surgical History:   Procedure Laterality Date    COLONOSCOPY  1/20/2012    Procedure:COLONOSCOPY; COLONOSCOPY; Surgeon:ELSY GARCIA; Location:[facility]    COLONOSCOPY N/A 12/1/2023    Procedure: Colonoscopy;  Surgeon: Erin Maher MD;  Location: [facility]    COLONOSCOPY N/A 12/1/2023    Procedure: COLONOSCOPY, WITH POLYPECTOMY AND BIOPSY;  Surgeon: Erin Maher MD;  Location: [facility]    COLONOSCOPY N/A 5/16/2025    Procedure: Colonoscopy;  Surgeon: Cindy Mancilla MD;  Location: [facility]    COLONOSCOPY N/A 5/16/2025    Procedure: COLONOSCOPY, WITH POLYPECTOMY AND BIOPSY;  Surgeon: Cindy Mancilla MD;  Location: [facility]    CORONARY ANGIOGRAPHY ADULT ORDER      HEART CATH, ANGIOPLASTY  02/22/2016    JAYME x2 to RCA    LEG SURGERY  2005    robotic surgery for prostate cancer  2013    Dr. Dudley    TONSILLECTOMY  5       FAMILY HISTORY:  Family History   Problem Relation Age of Onset    Cancer Mother        SOCIAL HISTORY:  Social History     Socioeconomic History    Marital status:      Spouse name: None    Number of children: None    Years of education: None    Highest education level: None   Occupational History    Occupation: [employer]   Tobacco Use    Smoking status: Never    Smokeless tobacco: Never   Vaping Use    Vaping status: Never Used   Substance and Sexual Activity    Alcohol use: Yes     Alcohol/week: 1.0 - 2.0 standard  drink of alcohol     Types: 1 - 2 Glasses of wine per week     Comment: 3-4 glasses wine per week    Drug use: No    Sexual activity: Yes     Partners: Female   Other Topics Concern    Caffeine Concern No     Comment: no caffeine    Sleep Concern No    Stress Concern No    Weight Concern No    Special Diet Yes     Comment: cut out pop and sodium    Exercise Yes     Comment: walking a lot, cardio rehab   Social History Narrative    so I said we could give him an Accu-Chek Simi from the clinic (I may drop it off with you on 3/2/16).           Social Drivers of Health     Financial Resource Strain: Low Risk  (4/4/2025)    Financial Resource Strain     Within the past 12 months, have you or your family members you live with been unable to get utilities (heat, electricity) when it was really needed?: No   Food Insecurity: Low Risk  (4/4/2025)    Food Insecurity     Within the past 12 months, did you worry that your food would run out before you got money to buy more?: No     Within the past 12 months, did the food you bought just not last and you didn t have money to get more?: No   Transportation Needs: Low Risk  (4/4/2025)    Transportation Needs     Within the past 12 months, has lack of transportation kept you from medical appointments, getting your medicines, non-medical meetings or appointments, work, or from getting things that you need?: No   Physical Activity: Unknown (4/4/2025)    Exercise Vital Sign     Days of Exercise per Week: 3 days   Stress: No Stress Concern Present (4/4/2025)    [employer] - Occupational Stress Questionnaire     Feeling of Stress : Not at all   Social Connections: Unknown (4/4/2025)    Social Connection and Isolation Panel [NHANES]     Frequency of Social Gatherings with Friends and Family: More than three times a week   Interpersonal Safety: Low Risk  (4/4/2025)    Interpersonal Safety     Do you feel physically and emotionally safe where you currently  "live?: Yes     Within the past 12 months, have you been hit, slapped, kicked or otherwise physically hurt by someone?: No     Within the past 12 months, have you been humiliated or emotionally abused in other ways by your partner or ex-partner?: No   Housing Stability: Low Risk  (4/4/2025)    Housing Stability     Do you have housing? : Yes     Are you worried about losing your housing?: No       Review of Systems:  Skin:          Eyes:         ENT:         Respiratory:          Cardiovascular:         Gastroenterology:        Genitourinary:         Musculoskeletal:         Neurologic:         Psychiatric:         Heme/Lymph/Imm:         Endocrine:           Physical Exam:  Vitals: /74   Pulse 74   Ht 1.778 m (5' 10\")   Wt 92.1 kg (203 lb)   SpO2 99%   BMI 29.13 kg/m      Constitutional:  cooperative, alert and oriented, well developed, well nourished, in no acute distress overweight      Skin:  warm and dry to the touch, no apparent skin lesions or masses noted          Head:  normocephalic, no masses or lesions        Eyes:  pupils equal and round        Lymph:      ENT:  no pallor or cyanosis, dentition good        Neck:           Respiratory:  normal breath sounds, clear to auscultation, normal A-P diameter, normal symmetry, normal respiratory excursion, no use of accessory muscles         Cardiac: regular rhythm, normal S1 and S2, apical impulse not displaced   S4 no presence of murmur          pulses full and equal, no bruits auscultated                                        GI:  not assessed this visit        Extremities and Muscular Skeletal:  no deformities, clubbing, cyanosis, erythema observed, no edema         right radial site clean without hum or bruit    Neurological:  no gross motor deficits, affect appropriate        Psych:  Alert and Oriented x 3        CC  Henrique Boles MD  [id number] [facility] [address]  [place] [address]                "

## 2025-06-06 ENCOUNTER — TRANSFERRED RECORDS (OUTPATIENT)
Dept: HEALTH INFORMATION MANAGEMENT | Facility: CLINIC | Age: 85
End: 2025-06-06
Payer: MEDICARE

## 2025-07-02 DIAGNOSIS — E11.69 TYPE 2 DIABETES MELLITUS WITH OTHER SPECIFIED COMPLICATION, WITHOUT LONG-TERM CURRENT USE OF INSULIN (H): ICD-10-CM

## 2025-07-02 RX ORDER — GLIMEPIRIDE 4 MG/1
TABLET ORAL
Qty: 180 TABLET | Refills: 0 | Status: SHIPPED | OUTPATIENT
Start: 2025-07-02

## 2025-07-03 DIAGNOSIS — I10 ESSENTIAL HYPERTENSION: ICD-10-CM

## 2025-07-03 RX ORDER — LISINOPRIL AND HYDROCHLOROTHIAZIDE 12.5; 2 MG/1; MG/1
1 TABLET ORAL DAILY
Qty: 90 TABLET | Refills: 1 | Status: SHIPPED | OUTPATIENT
Start: 2025-07-03

## 2025-07-11 ENCOUNTER — RESULTS FOLLOW-UP (OUTPATIENT)
Dept: FAMILY MEDICINE | Facility: CLINIC | Age: 85
End: 2025-07-11

## 2025-07-12 NOTE — RESULT ENCOUNTER NOTE
Evelio Montesinos, It was very nice to meet you during clinic visit. I had the opportunity to review your labs.    Chemistry shows normal electrolytes, normal kidney function test GFR of 81, : reassuring, normal calcium level, normal liver enzymes ALT AST and alk phosphatase ,  Elevated glucose of 220  Total protein albumin is normal ,    Test for diabetes HbA1c is improved down to 6.8 from 10.3 congratulations continue work on diet low carbs diet continue with  current medications will monitor your labs HbA1c repeat in 3 months.  I recommend you see diabetic educator and medical therapy management team/pharmacy.    Please schedule follow-up in the next 3 months office visit and labs..      It has been a pleasure to participate in your care.  Please call our clinic if you have any questions or concerns.     Sincerely,    Henrique Boles MD on 7/11/2025 at 7:27 PM

## 2025-07-12 NOTE — RESULT ENCOUNTER NOTE
Evelio Montesinos reviewed your EKG shows normal sinus rhythm, right bundle branch block [of no clinical significance].  Normal QT interval.  Please be reassured

## 2025-07-14 ENCOUNTER — TELEPHONE (OUTPATIENT)
Dept: PHARMACY | Facility: OTHER | Age: 85
End: 2025-07-14
Payer: MEDICARE

## 2025-07-14 NOTE — TELEPHONE ENCOUNTER
MT referral from: [place] clinic visit (referral by provider)    MT referral outreach attempt #2 on July 14, 2025 at 12:49 PM      Outcome: Spoke with patient patient will have to call back to schedule when he has recovered from colo-rectal surgery    Use VBC for the carrier/Plan on the flowsheet          Catie Teague [facility]  -[facility]  [phone]

## 2025-07-15 NOTE — TELEPHONE ENCOUNTER
Called pt and relayed provider results note. Pt expresses understanding and has no further questions or concerns at this time.       Florecita Arteaga RN

## 2025-08-14 ENCOUNTER — TRANSFERRED RECORDS (OUTPATIENT)
Dept: HEALTH INFORMATION MANAGEMENT | Facility: CLINIC | Age: 85
End: 2025-08-14
Payer: MEDICARE

## (undated) RX ORDER — FENTANYL CITRATE 50 UG/ML
INJECTION, SOLUTION INTRAMUSCULAR; INTRAVENOUS
Status: DISPENSED
Start: 2025-05-16

## (undated) RX ORDER — FENTANYL CITRATE 50 UG/ML
INJECTION, SOLUTION INTRAMUSCULAR; INTRAVENOUS
Status: DISPENSED
Start: 2023-12-01